# Patient Record
Sex: MALE | Race: WHITE | Employment: OTHER | URBAN - METROPOLITAN AREA
[De-identification: names, ages, dates, MRNs, and addresses within clinical notes are randomized per-mention and may not be internally consistent; named-entity substitution may affect disease eponyms.]

---

## 2017-01-23 ENCOUNTER — ANTI-COAG VISIT (OUTPATIENT)
Dept: CARDIOLOGY CLINIC | Age: 72
End: 2017-01-23

## 2017-01-23 ENCOUNTER — TELEPHONE (OUTPATIENT)
Dept: CARDIOLOGY CLINIC | Age: 72
End: 2017-01-23

## 2017-01-23 LAB — INR BLD: 2.09

## 2017-02-03 ENCOUNTER — TELEPHONE (OUTPATIENT)
Dept: CARDIOLOGY CLINIC | Age: 72
End: 2017-02-03

## 2017-02-03 RX ORDER — WARFARIN SODIUM 2.5 MG/1
TABLET ORAL
Qty: 90 TABLET | Refills: 3 | Status: SHIPPED | OUTPATIENT
Start: 2017-02-03 | End: 2017-02-28 | Stop reason: SDUPTHER

## 2017-02-03 RX ORDER — DILTIAZEM HYDROCHLORIDE 180 MG/1
180 CAPSULE, COATED, EXTENDED RELEASE ORAL DAILY
Qty: 90 CAPSULE | Refills: 3 | Status: SHIPPED | OUTPATIENT
Start: 2017-02-03 | End: 2017-02-28 | Stop reason: SDUPTHER

## 2017-02-27 ENCOUNTER — ANTI-COAG VISIT (OUTPATIENT)
Dept: CARDIOLOGY CLINIC | Age: 72
End: 2017-02-27

## 2017-02-27 LAB — INR BLD: 2.85

## 2017-02-28 ENCOUNTER — TELEPHONE (OUTPATIENT)
Dept: CARDIOLOGY CLINIC | Age: 72
End: 2017-02-28

## 2017-02-28 RX ORDER — WARFARIN SODIUM 2.5 MG/1
TABLET ORAL
Qty: 90 TABLET | Refills: 3 | Status: SHIPPED | OUTPATIENT
Start: 2017-02-28 | End: 2018-03-16 | Stop reason: SDUPTHER

## 2017-02-28 RX ORDER — DILTIAZEM HYDROCHLORIDE 180 MG/1
180 CAPSULE, COATED, EXTENDED RELEASE ORAL DAILY
Qty: 90 CAPSULE | Refills: 3 | Status: SHIPPED | OUTPATIENT
Start: 2017-02-28 | End: 2017-09-01 | Stop reason: SDUPTHER

## 2017-03-21 ENCOUNTER — NURSE ONLY (OUTPATIENT)
Dept: CARDIOLOGY CLINIC | Age: 72
End: 2017-03-21

## 2017-03-21 DIAGNOSIS — Z95.0 CARDIAC PACEMAKER IN SITU: Primary | ICD-10-CM

## 2017-03-21 DIAGNOSIS — I44.2 ATRIOVENTRICULAR BLOCK, COMPLETE (HCC): Chronic | ICD-10-CM

## 2017-03-21 PROCEDURE — 93296 REM INTERROG EVL PM/IDS: CPT | Performed by: INTERNAL MEDICINE

## 2017-03-21 PROCEDURE — 93294 REM INTERROG EVL PM/LDLS PM: CPT | Performed by: INTERNAL MEDICINE

## 2017-03-27 ENCOUNTER — ANTI-COAG VISIT (OUTPATIENT)
Dept: CARDIOLOGY CLINIC | Age: 72
End: 2017-03-27

## 2017-03-27 LAB
INR BLD: 3.3
INR BLD: 3.3

## 2017-03-28 ENCOUNTER — ANTI-COAG VISIT (OUTPATIENT)
Dept: CARDIOLOGY CLINIC | Age: 72
End: 2017-03-28

## 2017-04-24 ENCOUNTER — TELEPHONE (OUTPATIENT)
Dept: CARDIOLOGY CLINIC | Age: 72
End: 2017-04-24

## 2017-04-24 ENCOUNTER — ANTI-COAG VISIT (OUTPATIENT)
Dept: CARDIOLOGY CLINIC | Age: 72
End: 2017-04-24

## 2017-04-24 DIAGNOSIS — I48.0 PAROXYSMAL ATRIAL FIBRILLATION (HCC): Primary | Chronic | ICD-10-CM

## 2017-04-24 DIAGNOSIS — Z95.2 S/P AVR (AORTIC VALVE REPLACEMENT): Chronic | ICD-10-CM

## 2017-04-24 LAB — INR BLD: 3.13

## 2017-05-17 ENCOUNTER — OFFICE VISIT (OUTPATIENT)
Dept: CARDIOLOGY CLINIC | Age: 72
End: 2017-05-17

## 2017-05-17 VITALS
DIASTOLIC BLOOD PRESSURE: 76 MMHG | WEIGHT: 179.4 LBS | BODY MASS INDEX: 25.68 KG/M2 | HEIGHT: 70 IN | OXYGEN SATURATION: 96 % | SYSTOLIC BLOOD PRESSURE: 122 MMHG | HEART RATE: 72 BPM

## 2017-05-17 DIAGNOSIS — E78.5 HYPERLIPIDEMIA, UNSPECIFIED HYPERLIPIDEMIA TYPE: ICD-10-CM

## 2017-05-17 DIAGNOSIS — Z95.2 S/P AVR (AORTIC VALVE REPLACEMENT): Primary | ICD-10-CM

## 2017-05-17 DIAGNOSIS — I48.0 PAROXYSMAL ATRIAL FIBRILLATION (HCC): ICD-10-CM

## 2017-05-17 DIAGNOSIS — I10 ESSENTIAL HYPERTENSION: ICD-10-CM

## 2017-05-17 DIAGNOSIS — Z95.0 CARDIAC PACEMAKER IN SITU: ICD-10-CM

## 2017-05-17 DIAGNOSIS — Q21.0 VSD (VENTRICULAR SEPTAL DEFECT): ICD-10-CM

## 2017-05-17 DIAGNOSIS — Z86.79 S/P ABLATION OF ATRIAL FLUTTER: ICD-10-CM

## 2017-05-17 DIAGNOSIS — Z98.890 S/P ABLATION OF ATRIAL FLUTTER: ICD-10-CM

## 2017-05-17 PROCEDURE — 99213 OFFICE O/P EST LOW 20 MIN: CPT | Performed by: INTERNAL MEDICINE

## 2017-05-17 PROCEDURE — 4040F PNEUMOC VAC/ADMIN/RCVD: CPT | Performed by: INTERNAL MEDICINE

## 2017-05-17 PROCEDURE — 1123F ACP DISCUSS/DSCN MKR DOCD: CPT | Performed by: INTERNAL MEDICINE

## 2017-05-17 PROCEDURE — 1036F TOBACCO NON-USER: CPT | Performed by: INTERNAL MEDICINE

## 2017-05-17 PROCEDURE — G8420 CALC BMI NORM PARAMETERS: HCPCS | Performed by: INTERNAL MEDICINE

## 2017-05-17 PROCEDURE — 3017F COLORECTAL CA SCREEN DOC REV: CPT | Performed by: INTERNAL MEDICINE

## 2017-05-17 PROCEDURE — G8427 DOCREV CUR MEDS BY ELIG CLIN: HCPCS | Performed by: INTERNAL MEDICINE

## 2017-05-22 ENCOUNTER — ANTI-COAG VISIT (OUTPATIENT)
Dept: CARDIOLOGY CLINIC | Age: 72
End: 2017-05-22

## 2017-05-22 LAB
COAGULATION TISSUE FACTOR INDUCED BLD TIME PT. COAG: 38.1 SECS
INR BLD: 3.63
INR BLD: 3.63

## 2017-06-19 ENCOUNTER — ANTI-COAG VISIT (OUTPATIENT)
Dept: CARDIOLOGY CLINIC | Age: 72
End: 2017-06-19

## 2017-06-19 LAB
COAGULATION TISSUE FACTOR INDUCED BLD TIME PT. COAG: 29.6 SECS
INR BLD: 2.8
INR BLD: 2.8

## 2017-06-20 ENCOUNTER — NURSE ONLY (OUTPATIENT)
Dept: CARDIOLOGY CLINIC | Age: 72
End: 2017-06-20

## 2017-06-20 DIAGNOSIS — I44.2 ATRIOVENTRICULAR BLOCK, COMPLETE (HCC): Chronic | ICD-10-CM

## 2017-06-20 DIAGNOSIS — Z95.0 CARDIAC PACEMAKER IN SITU: Primary | ICD-10-CM

## 2017-06-20 PROCEDURE — 93294 REM INTERROG EVL PM/LDLS PM: CPT | Performed by: INTERNAL MEDICINE

## 2017-06-20 PROCEDURE — 93296 REM INTERROG EVL PM/IDS: CPT | Performed by: INTERNAL MEDICINE

## 2017-07-17 ENCOUNTER — ANTI-COAG VISIT (OUTPATIENT)
Dept: CARDIOLOGY CLINIC | Age: 72
End: 2017-07-17

## 2017-07-17 LAB — INR BLD: 3.1

## 2017-08-14 ENCOUNTER — ANTI-COAG VISIT (OUTPATIENT)
Dept: CARDIOLOGY CLINIC | Age: 72
End: 2017-08-14

## 2017-09-01 ENCOUNTER — OFFICE VISIT (OUTPATIENT)
Dept: CARDIOLOGY CLINIC | Age: 72
End: 2017-09-01

## 2017-09-01 VITALS
OXYGEN SATURATION: 97 % | BODY MASS INDEX: 25.2 KG/M2 | HEART RATE: 72 BPM | HEIGHT: 70 IN | WEIGHT: 176 LBS | SYSTOLIC BLOOD PRESSURE: 113 MMHG | DIASTOLIC BLOOD PRESSURE: 73 MMHG

## 2017-09-01 DIAGNOSIS — Z79.899 ON AMIODARONE THERAPY: ICD-10-CM

## 2017-09-01 DIAGNOSIS — Z86.79 S/P ABLATION OF ATRIAL FLUTTER: Chronic | ICD-10-CM

## 2017-09-01 DIAGNOSIS — Z95.0 CARDIAC PACEMAKER IN SITU: Primary | ICD-10-CM

## 2017-09-01 DIAGNOSIS — Z98.890 S/P ABLATION OF ATRIAL FLUTTER: Chronic | ICD-10-CM

## 2017-09-01 DIAGNOSIS — I44.2 ATRIOVENTRICULAR BLOCK, COMPLETE (HCC): Chronic | ICD-10-CM

## 2017-09-01 PROCEDURE — 4040F PNEUMOC VAC/ADMIN/RCVD: CPT | Performed by: INTERNAL MEDICINE

## 2017-09-01 PROCEDURE — 1123F ACP DISCUSS/DSCN MKR DOCD: CPT | Performed by: INTERNAL MEDICINE

## 2017-09-01 PROCEDURE — 1036F TOBACCO NON-USER: CPT | Performed by: INTERNAL MEDICINE

## 2017-09-01 PROCEDURE — 3017F COLORECTAL CA SCREEN DOC REV: CPT | Performed by: INTERNAL MEDICINE

## 2017-09-01 PROCEDURE — 99214 OFFICE O/P EST MOD 30 MIN: CPT | Performed by: INTERNAL MEDICINE

## 2017-09-01 PROCEDURE — G8419 CALC BMI OUT NRM PARAM NOF/U: HCPCS | Performed by: INTERNAL MEDICINE

## 2017-09-01 PROCEDURE — 93280 PM DEVICE PROGR EVAL DUAL: CPT | Performed by: INTERNAL MEDICINE

## 2017-09-01 PROCEDURE — G8427 DOCREV CUR MEDS BY ELIG CLIN: HCPCS | Performed by: INTERNAL MEDICINE

## 2017-09-06 ENCOUNTER — TELEPHONE (OUTPATIENT)
Dept: CARDIOLOGY CLINIC | Age: 72
End: 2017-09-06

## 2017-09-11 ENCOUNTER — ANTI-COAG VISIT (OUTPATIENT)
Dept: CARDIOLOGY CLINIC | Age: 72
End: 2017-09-11

## 2017-09-11 LAB
ALBUMIN SERPL-MCNC: 3.6 G/DL
ALP BLD-CCNC: 80 U/L
ALT SERPL-CCNC: 98 U/L
AST SERPL-CCNC: 61 U/L
BILIRUB SERPL-MCNC: 0.2 MG/DL
BILIRUB SERPL-MCNC: 0.83 MG/DL
HEPATIC FUNCTION PANEL: ABNORMAL
INR BLD: 2.19
T4 FREE: 1.27 NG/DL
THYROTROPIN RELEASING HORMONE: 5.24 UIU/ML
TOTAL PROTEIN: 6.4 G/DL

## 2017-10-02 ENCOUNTER — ANTI-COAG VISIT (OUTPATIENT)
Dept: CARDIOLOGY CLINIC | Age: 72
End: 2017-10-02

## 2017-10-02 LAB — INR BLD: 2.24

## 2017-10-30 ENCOUNTER — ANTI-COAG VISIT (OUTPATIENT)
Dept: CARDIOLOGY CLINIC | Age: 72
End: 2017-10-30

## 2017-10-30 LAB — INR BLD: 2.07

## 2017-11-10 ENCOUNTER — OFFICE VISIT (OUTPATIENT)
Dept: CARDIOLOGY CLINIC | Age: 72
End: 2017-11-10

## 2017-11-10 ENCOUNTER — TELEPHONE (OUTPATIENT)
Dept: CARDIOLOGY CLINIC | Age: 72
End: 2017-11-10

## 2017-11-10 ENCOUNTER — PROCEDURE VISIT (OUTPATIENT)
Dept: CARDIOLOGY CLINIC | Age: 72
End: 2017-11-10

## 2017-11-10 VITALS
WEIGHT: 184 LBS | BODY MASS INDEX: 26.34 KG/M2 | HEIGHT: 70 IN | DIASTOLIC BLOOD PRESSURE: 60 MMHG | HEART RATE: 60 BPM | SYSTOLIC BLOOD PRESSURE: 102 MMHG

## 2017-11-10 DIAGNOSIS — I48.0 PAROXYSMAL ATRIAL FIBRILLATION (HCC): Chronic | ICD-10-CM

## 2017-11-10 DIAGNOSIS — I44.2 ATRIOVENTRICULAR BLOCK, COMPLETE (HCC): Chronic | ICD-10-CM

## 2017-11-10 DIAGNOSIS — Z95.0 CARDIAC PACEMAKER IN SITU: Chronic | ICD-10-CM

## 2017-11-10 DIAGNOSIS — Z95.0 CARDIAC PACEMAKER IN SITU: Primary | ICD-10-CM

## 2017-11-10 DIAGNOSIS — Z95.2 S/P AVR (AORTIC VALVE REPLACEMENT): Primary | Chronic | ICD-10-CM

## 2017-11-10 PROCEDURE — 3017F COLORECTAL CA SCREEN DOC REV: CPT | Performed by: INTERNAL MEDICINE

## 2017-11-10 PROCEDURE — G8484 FLU IMMUNIZE NO ADMIN: HCPCS | Performed by: INTERNAL MEDICINE

## 2017-11-10 PROCEDURE — 93280 PM DEVICE PROGR EVAL DUAL: CPT | Performed by: INTERNAL MEDICINE

## 2017-11-10 PROCEDURE — G8427 DOCREV CUR MEDS BY ELIG CLIN: HCPCS | Performed by: INTERNAL MEDICINE

## 2017-11-10 PROCEDURE — 4040F PNEUMOC VAC/ADMIN/RCVD: CPT | Performed by: INTERNAL MEDICINE

## 2017-11-10 PROCEDURE — G8417 CALC BMI ABV UP PARAM F/U: HCPCS | Performed by: INTERNAL MEDICINE

## 2017-11-10 PROCEDURE — 1123F ACP DISCUSS/DSCN MKR DOCD: CPT | Performed by: INTERNAL MEDICINE

## 2017-11-10 PROCEDURE — 1036F TOBACCO NON-USER: CPT | Performed by: INTERNAL MEDICINE

## 2017-11-10 PROCEDURE — 99214 OFFICE O/P EST MOD 30 MIN: CPT | Performed by: INTERNAL MEDICINE

## 2017-11-10 RX ORDER — METOPROLOL SUCCINATE 50 MG/1
TABLET, EXTENDED RELEASE ORAL
Qty: 90 TABLET | Refills: 3 | Status: SHIPPED | OUTPATIENT
Start: 2017-11-10 | End: 2018-12-06 | Stop reason: SDUPTHER

## 2017-11-10 RX ORDER — DILTIAZEM HYDROCHLORIDE 180 MG/1
CAPSULE, COATED, EXTENDED RELEASE ORAL
Qty: 90 CAPSULE | Refills: 3 | Status: SHIPPED | OUTPATIENT
Start: 2017-11-10 | End: 2018-12-06 | Stop reason: SDUPTHER

## 2017-11-10 NOTE — PROGRESS NOTES
surgery (12-28-12); and ventral hernia repair. Social History: reports that he has never smoked. He has never used smokeless tobacco. He reports that he does not drink alcohol or use drugs. Family History:family history includes Cancer in his mother. Home Medications:  Outpatient Encounter Prescriptions as of 11/10/2017   Medication Sig Dispense Refill    warfarin (COUMADIN) 2.5 MG tablet One tab by mouth daily or as directed 90 tablet 3    diltiazem (CARDIZEM CD) 180 MG extended release capsule Take 1 capsule by mouth  daily 90 capsule 3    furosemide (LASIX) 40 MG tablet Take 1 tablet by mouth  daily 90 tablet 3    metoprolol succinate (TOPROL XL) 50 MG extended release tablet Take 1 tablet by mouth  daily 90 tablet 3    amiodarone (CORDARONE) 200 MG tablet Take 1 tablet by mouth  daily (Patient taking differently: 100 mg Take 1 tablet by mouth  daily) 90 tablet 3    finasteride (PROSCAR) 5 MG tablet Take 1 tablet by mouth nightly. 90 tablet 3    Multiple Vitamins-Minerals (MULTIVITAMIN PO) Take 1 tablet by mouth daily.  levothyroxine (LEVOTHROID) 75 MCG tablet Take 75 mcg by mouth daily.  aspirin EC 81 MG EC tablet Take 81 mg by mouth daily.  tamsulosin (FLOMAX) 0.4 MG capsule Take 0.4 mg by mouth daily. No facility-administered encounter medications on file as of 11/10/2017. Allergies:Digoxin     Review of Systems   Constitutional: Negative. HENT: Negative. Eyes: Negative. Respiratory: Negative. Cardiovascular: No Palpitations, has BLE edema  Gastrointestinal: Negative. Genitourinary: Negative. Musculoskeletal: Negative. Skin: Negative. Neurological: Negative. Hematological: Negative. Psychiatric/Behavioral: Negative. /60   Pulse 60   Ht 5' 10\" (1.778 m)   Wt 184 lb (83.5 kg)   BMI 26.40 kg/m² . Stable. EKG today--Sinus  Rhythm   -RSR(V1) -nondiagnostic.    -Old inferior infarct.    Objective:  Physical Exam   Constitutional:

## 2017-11-10 NOTE — PROGRESS NOTES
Patient comes in for programming evaluation for his [address] Pacemaker. All sensing and pacing parameters are within normal range. No changes need to be made at this time. Please see interrogation for more detail. AF burden 20%. Last PAF 11/10/17. Patient to see Dr. Karen Gallegos today. Patient will follow up in 3 months in office or remotely.

## 2017-11-13 NOTE — TELEPHONE ENCOUNTER
Wife of pt called and adv they are not available the entire month of dec. I adv we will reach out in Dec later when [facility] Jan 2018 scheduled is available.     Pt is to be contacted on cell in Dec:  Kevin Ellis  Wife Cell [phone]

## 2017-11-13 NOTE — COMMUNICATION BODY
Assessment  1. Paroxysmal atrial fibrillation- 20% AF burden, but the majority of the episodes are brief in duration. The AF burden has been very consistent at 13- 20%. 2. S/P AVR: porcine    3. 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 Continue amiodarone 100 mg.  2. INR goal 2.0-3.0 in the setting of PAF and aortic valve replacement. 3. Recommend consideration for catheter ablation for PAF.    4. Follow up with me in 3 months.         Zak Morrison M.D.

## 2017-11-21 ENCOUNTER — OFFICE VISIT (OUTPATIENT)
Dept: CARDIOLOGY CLINIC | Age: 72
End: 2017-11-21

## 2017-11-21 VITALS
SYSTOLIC BLOOD PRESSURE: 102 MMHG | OXYGEN SATURATION: 96 % | HEART RATE: 67 BPM | WEIGHT: 179 LBS | BODY MASS INDEX: 25.62 KG/M2 | DIASTOLIC BLOOD PRESSURE: 62 MMHG | HEIGHT: 70 IN

## 2017-11-21 DIAGNOSIS — I48.0 PAROXYSMAL ATRIAL FIBRILLATION (HCC): Chronic | ICD-10-CM

## 2017-11-21 DIAGNOSIS — I10 ESSENTIAL HYPERTENSION: Chronic | ICD-10-CM

## 2017-11-21 DIAGNOSIS — Z95.2 S/P AVR (AORTIC VALVE REPLACEMENT): Primary | Chronic | ICD-10-CM

## 2017-11-21 DIAGNOSIS — E78.2 MIXED HYPERLIPIDEMIA: Chronic | ICD-10-CM

## 2017-11-21 PROCEDURE — G8427 DOCREV CUR MEDS BY ELIG CLIN: HCPCS | Performed by: INTERNAL MEDICINE

## 2017-11-21 PROCEDURE — 1123F ACP DISCUSS/DSCN MKR DOCD: CPT | Performed by: INTERNAL MEDICINE

## 2017-11-21 PROCEDURE — 1036F TOBACCO NON-USER: CPT | Performed by: INTERNAL MEDICINE

## 2017-11-21 PROCEDURE — 99214 OFFICE O/P EST MOD 30 MIN: CPT | Performed by: INTERNAL MEDICINE

## 2017-11-21 PROCEDURE — G8417 CALC BMI ABV UP PARAM F/U: HCPCS | Performed by: INTERNAL MEDICINE

## 2017-11-21 PROCEDURE — G8484 FLU IMMUNIZE NO ADMIN: HCPCS | Performed by: INTERNAL MEDICINE

## 2017-11-21 PROCEDURE — 4040F PNEUMOC VAC/ADMIN/RCVD: CPT | Performed by: INTERNAL MEDICINE

## 2017-11-21 PROCEDURE — 3017F COLORECTAL CA SCREEN DOC REV: CPT | Performed by: INTERNAL MEDICINE

## 2017-11-21 NOTE — COMMUNICATION BODY
disease. Surgical History:   has a past surgical history that includes Cardiac surgery (2009); cyst removal (1972); skin biopsy; Pacemaker insertion; Atrial ablation surgery (12-28-12); and ventral hernia repair. Social History:    , lives with spouse in [facility]. He is retired , and current  [facility] reports that he has never smoked. He has never used smokeless tobacco. He reports that he does not drink alcohol or use drugs. Family History:  family history includes Cancer in his mother. Home Medications:  Prior to Admission medications    Medication Sig Start Date End Date Taking? Authorizing Provider   diltiazem (CARDIZEM CD) 180 MG extended release capsule Take 1 capsule by mouth  daily 11/10/17  Yes Eliza Medeiros MD   metoprolol succinate (TOPROL XL) 50 MG extended release tablet Take 1 tablet by mouth  daily 11/10/17  Yes Eliza Medeiros MD   warfarin (COUMADIN) 2.5 MG tablet One tab by mouth daily or as directed 2/28/17  Yes Merlin Chang NP   furosemide (LASIX) 40 MG tablet Take 1 tablet by mouth  daily 11/29/16  Yes Paulo Cruz MD   amiodarone (CORDARONE) 200 MG tablet Take 1 tablet by mouth  daily  Patient taking differently: 100 mg daily Take 1 tablet by mouth  daily 11/29/16  Yes Paulo Cruz MD   finasteride (PROSCAR) 5 MG tablet Take 1 tablet by mouth nightly. 7/18/14  Yes Eliza Medeiros MD   Multiple Vitamins-Minerals (MULTIVITAMIN PO) Take 1 tablet by mouth daily. Yes Historical Provider, MD   levothyroxine (LEVOTHROID) 75 MCG tablet Take 75 mcg by mouth daily. Yes Historical Provider, MD   aspirin EC 81 MG EC tablet Take 81 mg by mouth daily. 5/28/07  Yes Historical Provider, MD   tamsulosin (FLOMAX) 0.4 MG capsule Take 0.4 mg by mouth daily. Yes Historical Provider, MD        Allergies:  Digoxin     Review of Systems:   · Constitutional: there has been no unanticipated weight loss. spheres  · Moves all extremities well  · Exhibits normal gait balance and coordination  · No abnormalities of mood, affect, memory, mentation, or behavior are noted  Skin:  · Skin: warm and dry. Lab Results   Component Value Date    CHOL 275 (H) 08/04/2014    CHOL 244 (H) 10/01/2013    CHOL 266 (H) 03/14/2011     Lab Results   Component Value Date    TRIG 132 08/04/2014    TRIG 136 10/01/2013    TRIG 148 03/14/2011     Lab Results   Component Value Date    HDL 67 08/04/2014    HDL 62 10/01/2013    HDL 68 (H) 03/14/2011     Lab Results   Component Value Date    LDLCALC 168 (H) 03/14/2011     Lab Results   Component Value Date    LABVLDL 30 03/14/2011    VLDL 26 08/04/2014    VLDL 27 10/01/2013     No results found for: CHOLHDLRATIO    Assessment:     1. S/P AVR (aortic valve replacement):  S/P porcine AVR and VSD closure in 12/06. Most recent ECHO 12/1/15  showed EF 55%; normally functioning porcine aortic valve mean gradient of 12 mmHg. Velocity=1.53m/s. 2. Paroxysmal atrial fibrillation ([address]):  Note Pacemaker interrogation on 07/20/17 showed 13 % AF burden, 70% of the episodes are less than 5 minutes in duration. He has 81% RA pacing and 3.2% RV pacing. Most recent EKG 11/10/17 showed NSR 76 bpm; incomplete RBBB; inferior infarct pattern. Follows with EP partner, Dr. Suleman Jones, and is on amiodarone with chronically elevated LFT's. Note dose was decreased from 200mg to 100mg by him at his last OV. 3. Essential hypertension:  Well controlled and will continue current medical regimen. 4. Mixed hyperlipidemia: He is NOT on statin med per Dr. Sam Smith due to elevated LFT's on amiodarone and I agree that statins would not be a good idea. Encouraged good diet as best possible. Need copy of most recent lipids since 2014 is latest result in [facility] system. Could consider PCSK9 inhibitors if not improved and patient can afford. He is not inclined to change regimen at this time. Plan:  1.  No med changes

## 2017-11-21 NOTE — PROGRESS NOTES
disease. Surgical History:   has a past surgical history that includes Cardiac surgery (2009); cyst removal (1972); skin biopsy; Pacemaker insertion; Atrial ablation surgery (12-28-12); and ventral hernia repair. Social History:   , lives with spouse in [facility]. He is retired , and current  [facility] reports that he has never smoked. He has never used smokeless tobacco. He reports that he does not drink alcohol or use drugs. Family History:  family history includes Cancer in his mother. Home Medications:  Prior to Admission medications    Medication Sig Start Date End Date Taking? Authorizing Provider   diltiazem (CARDIZEM CD) 180 MG extended release capsule Take 1 capsule by mouth  daily 11/10/17  Yes Oskar Schuster MD   metoprolol succinate (TOPROL XL) 50 MG extended release tablet Take 1 tablet by mouth  daily 11/10/17  Yes Oskar Schuster MD   warfarin (COUMADIN) 2.5 MG tablet One tab by mouth daily or as directed 2/28/17  Yes Esaw Goltz, NP   furosemide (LASIX) 40 MG tablet Take 1 tablet by mouth  daily 11/29/16  Yes Kavon Barr MD   amiodarone (CORDARONE) 200 MG tablet Take 1 tablet by mouth  daily  Patient taking differently: 100 mg daily Take 1 tablet by mouth  daily 11/29/16  Yes Kavon Barr MD   finasteride (PROSCAR) 5 MG tablet Take 1 tablet by mouth nightly. 7/18/14  Yes Oskar Schuster MD   Multiple Vitamins-Minerals (MULTIVITAMIN PO) Take 1 tablet by mouth daily. Yes Historical Provider, MD   levothyroxine (LEVOTHROID) 75 MCG tablet Take 75 mcg by mouth daily. Yes Historical Provider, MD   aspirin EC 81 MG EC tablet Take 81 mg by mouth daily. 5/28/07  Yes Historical Provider, MD   tamsulosin (FLOMAX) 0.4 MG capsule Take 0.4 mg by mouth daily. Yes Historical Provider, MD        Allergies:  Digoxin     Review of Systems:   · Constitutional: there has been no unanticipated weight loss. There's been no change in energy level, sleep pattern, or activity level. · Eyes: No visual changes or diplopia. No scleral icterus. · ENT: No Headaches, hearing loss or vertigo. No mouth sores or sore throat. · Cardiovascular: Reviewed in HPI  · Respiratory: No cough or wheezing, no sputum production. No hematemesis. · Gastrointestinal: No abdominal pain, appetite loss, blood in stools. No change in bowel or bladder habits. · Genitourinary: No dysuria, trouble voiding, or hematuria. · Musculoskeletal:  No gait disturbance, weakness or joint complaints. · Integumentary: No rash or pruritis. · Neurological: No headache, diplopia, change in muscle strength, numbness or tingling. No change in gait, balance, coordination, mood, affect, memory, mentation, behavior. · Psychiatric: No anxiety, no depression. · Endocrine: No malaise, fatigue or temperature intolerance. No excessive thirst, fluid intake, or urination. No tremor. · Hematologic/Lymphatic: No abnormal bruising or bleeding, blood clots or swollen lymph nodes. · Allergic/Immunologic: No nasal congestion or hives. Physical Examination:    Vitals:    11/21/17 1327   BP: 102/62   Pulse: 67   SpO2: 96%        Constitutional and General Appearance: NAD   Respiratory:  · Normal excursion and expansion without use of accessory muscles  · Resp Auscultation: Normal breath sounds without dullness  Cardiovascular:  · The apical impulses not displaced  · Heart tones are crisp and normal  · Cervical veins are not engorged  · The carotid upstroke is normal in amplitude and contour without delay or bruit  · Normal S1S2, No S3, 2/6 systolic Murmur  · Peripheral pulses are symmetrical and full  · There is no clubbing, cyanosis of the extremities.   · No edema  · Femoral Arteries: 2+ and equal  · Pedal Pulses: 2+ and equal   Abdomen:  · No masses or tenderness  · Liver/Spleen: No Abnormalities Noted  Neurological/Psychiatric:  · Alert and oriented in all spheres  · Moves all extremities well  · Exhibits normal gait balance and coordination  · No abnormalities of mood, affect, memory, mentation, or behavior are noted  Skin:  · Skin: warm and dry. Lab Results   Component Value Date    CHOL 275 (H) 08/04/2014    CHOL 244 (H) 10/01/2013    CHOL 266 (H) 03/14/2011     Lab Results   Component Value Date    TRIG 132 08/04/2014    TRIG 136 10/01/2013    TRIG 148 03/14/2011     Lab Results   Component Value Date    HDL 67 08/04/2014    HDL 62 10/01/2013    HDL 68 (H) 03/14/2011     Lab Results   Component Value Date    LDLCALC 168 (H) 03/14/2011     Lab Results   Component Value Date    LABVLDL 30 03/14/2011    VLDL 26 08/04/2014    VLDL 27 10/01/2013     No results found for: CHOLHDLRATIO    Assessment:     1. S/P AVR (aortic valve replacement):  S/P porcine AVR and VSD closure in 12/06. Most recent ECHO 12/1/15  showed EF 55%; normally functioning porcine aortic valve mean gradient of 12 mmHg. Velocity=1.53m/s. 2. Paroxysmal atrial fibrillation ([address]):  Note Pacemaker interrogation on 07/20/17 showed 13 % AF burden, 70% of the episodes are less than 5 minutes in duration. He has 81% RA pacing and 3.2% RV pacing. Most recent EKG 11/10/17 showed NSR 76 bpm; incomplete RBBB; inferior infarct pattern. Follows with EP partner, Dr. Henri Edwards, and is on amiodarone with chronically elevated LFT's. Note dose was decreased from 200mg to 100mg by him at his last OV. 3. Essential hypertension:  Well controlled and will continue current medical regimen. 4. Mixed hyperlipidemia: He is NOT on statin med per Dr. Kena Willard due to elevated LFT's on amiodarone and I agree that statins would not be a good idea. Encouraged good diet as best possible. Need copy of most recent lipids since 2014 is latest result in [facility] system. Could consider PCSK9 inhibitors if not improved and patient can afford. He is not inclined to change regimen at this time. Plan:  1.  No med changes

## 2017-11-28 ENCOUNTER — TELEPHONE (OUTPATIENT)
Dept: CARDIOLOGY CLINIC | Age: 72
End: 2017-11-28

## 2017-12-04 ENCOUNTER — ANTI-COAG VISIT (OUTPATIENT)
Dept: CARDIOLOGY CLINIC | Age: 72
End: 2017-12-04

## 2017-12-04 LAB
COAGULATION TISSUE FACTOR INDUCED BLD TIME PT. COAG: 21.4 SECS
INR BLD: 1.99

## 2017-12-06 ENCOUNTER — TELEPHONE (OUTPATIENT)
Dept: CARDIOLOGY CLINIC | Age: 72
End: 2017-12-06

## 2018-01-02 ENCOUNTER — ANTI-COAG VISIT (OUTPATIENT)
Dept: CARDIOLOGY CLINIC | Age: 73
End: 2018-01-02

## 2018-01-02 ENCOUNTER — TELEPHONE (OUTPATIENT)
Dept: CARDIOLOGY CLINIC | Age: 73
End: 2018-01-02

## 2018-01-02 LAB
COAGULATION TISSUE FACTOR INDUCED BLD TIME PT. COAG: 20.3 SECS
INR BLD: 1.88

## 2018-01-02 NOTE — TELEPHONE ENCOUNTER
Please call pt. He self doses. Please ask what is dosing regimen is? Has he already adjusted his dosing? If so, what changes did he make?

## 2018-01-09 ENCOUNTER — ANTI-COAG VISIT (OUTPATIENT)
Dept: CARDIOLOGY CLINIC | Age: 73
End: 2018-01-09

## 2018-01-09 ENCOUNTER — TELEPHONE (OUTPATIENT)
Dept: CARDIOLOGY CLINIC | Age: 73
End: 2018-01-09

## 2018-01-09 LAB
COAGULATION TISSUE FACTOR INDUCED BLD TIME PT. COAG: 24.4 SECS
INR BLD: 2.28

## 2018-01-09 NOTE — TELEPHONE ENCOUNTER
----- Message from Leigha Maradiaga MD sent at 1/9/2018  8:48 AM EST -----  Please let patient know that INR is therapeutic at 2.28 from today. CPM and routine check INR per protocol. Thanks.

## 2018-01-11 LAB
ALBUMIN SERPL-MCNC: 4.4 G/DL
ALP BLD-CCNC: 76 U/L
ALT SERPL-CCNC: 63 U/L
ANION GAP SERPL CALCULATED.3IONS-SCNC: NORMAL MMOL/L
AST SERPL-CCNC: 62 U/L
BILIRUB SERPL-MCNC: 0.9 MG/DL (ref 0.1–1.4)
BUN BLDV-MCNC: 28 MG/DL
CALCIUM SERPL-MCNC: 9 MG/DL
CHLORIDE BLD-SCNC: 101 MMOL/L
CHOLESTEROL, TOTAL: 263 MG/DL
CHOLESTEROL/HDL RATIO: ABNORMAL
CO2: 26 MMOL/L
CREAT SERPL-MCNC: 1.33 MG/DL
GFR CALCULATED: NORMAL
GLUCOSE BLD-MCNC: 73 MG/DL
HDLC SERPL-MCNC: 74 MG/DL (ref 35–70)
LDL CHOLESTEROL CALCULATED: 166 MG/DL (ref 0–160)
POTASSIUM SERPL-SCNC: 4.4 MMOL/L
SODIUM BLD-SCNC: 141 MMOL/L
TOTAL PROTEIN: 6.9
TRIGL SERPL-MCNC: 113 MG/DL
VLDLC SERPL CALC-MCNC: 23 MG/DL

## 2018-01-22 ENCOUNTER — OFFICE VISIT (OUTPATIENT)
Dept: CARDIOLOGY CLINIC | Age: 73
End: 2018-01-22

## 2018-01-22 ENCOUNTER — PROCEDURE VISIT (OUTPATIENT)
Dept: CARDIOLOGY CLINIC | Age: 73
End: 2018-01-22

## 2018-01-22 ENCOUNTER — TELEPHONE (OUTPATIENT)
Dept: CARDIOLOGY CLINIC | Age: 73
End: 2018-01-22

## 2018-01-22 VITALS
HEIGHT: 70 IN | BODY MASS INDEX: 26.34 KG/M2 | SYSTOLIC BLOOD PRESSURE: 114 MMHG | WEIGHT: 184 LBS | DIASTOLIC BLOOD PRESSURE: 64 MMHG | OXYGEN SATURATION: 97 % | HEART RATE: 60 BPM

## 2018-01-22 DIAGNOSIS — E78.2 MIXED HYPERLIPIDEMIA: Chronic | ICD-10-CM

## 2018-01-22 DIAGNOSIS — I10 ESSENTIAL HYPERTENSION: Chronic | ICD-10-CM

## 2018-01-22 DIAGNOSIS — Z95.0 CARDIAC PACEMAKER IN SITU: ICD-10-CM

## 2018-01-22 DIAGNOSIS — Z95.0 CARDIAC PACEMAKER IN SITU: Primary | Chronic | ICD-10-CM

## 2018-01-22 DIAGNOSIS — I44.2 ATRIOVENTRICULAR BLOCK, COMPLETE (HCC): Chronic | ICD-10-CM

## 2018-01-22 DIAGNOSIS — I48.0 PAROXYSMAL ATRIAL FIBRILLATION (HCC): Chronic | ICD-10-CM

## 2018-01-22 PROBLEM — Z95.2 S/P AVR (AORTIC VALVE REPLACEMENT): Chronic | Status: ACTIVE | Noted: 2018-01-22

## 2018-01-22 PROCEDURE — 4040F PNEUMOC VAC/ADMIN/RCVD: CPT | Performed by: INTERNAL MEDICINE

## 2018-01-22 PROCEDURE — 1036F TOBACCO NON-USER: CPT | Performed by: INTERNAL MEDICINE

## 2018-01-22 PROCEDURE — 99214 OFFICE O/P EST MOD 30 MIN: CPT | Performed by: INTERNAL MEDICINE

## 2018-01-22 PROCEDURE — 1123F ACP DISCUSS/DSCN MKR DOCD: CPT | Performed by: INTERNAL MEDICINE

## 2018-01-22 PROCEDURE — G8484 FLU IMMUNIZE NO ADMIN: HCPCS | Performed by: INTERNAL MEDICINE

## 2018-01-22 PROCEDURE — G8417 CALC BMI ABV UP PARAM F/U: HCPCS | Performed by: INTERNAL MEDICINE

## 2018-01-22 PROCEDURE — G8427 DOCREV CUR MEDS BY ELIG CLIN: HCPCS | Performed by: INTERNAL MEDICINE

## 2018-01-22 PROCEDURE — 3017F COLORECTAL CA SCREEN DOC REV: CPT | Performed by: INTERNAL MEDICINE

## 2018-01-22 PROCEDURE — 93280 PM DEVICE PROGR EVAL DUAL: CPT | Performed by: INTERNAL MEDICINE

## 2018-01-22 RX ORDER — FUROSEMIDE 40 MG/1
TABLET ORAL
Qty: 90 TABLET | Refills: 3 | Status: SHIPPED | OUTPATIENT
Start: 2018-01-22 | End: 2018-12-06 | Stop reason: SDUPTHER

## 2018-01-22 NOTE — PROGRESS NOTES
Sam   Electrophysiology   Date: 1/22/2018       CC: Atrial fibrillation   HPI: Herb Rubinstein is a 67 y.o.  male with a history of permanent pacemaker for temporary AV block post AVR (porcine), [place], and closure of VSD in 12/06. He had a radiofrequency catheter ablation for atrial flutter on 12/28/12. He has a history of PAF. Amiodarone was increased from 150 mg to 200 mg daily. He is on coumadin for anticoagulation. Pt is referred today from  to discuss ablation procedure. He reports he is asymptomatic with a fib episodes with last documented episode this morning at 10 am for 2 hour duration. Pt reports he was at [facility] with his wife but had no symptoms. Today he reports he has been feeling well. He currently denies chest pain, palpitations, syncope, dizziness, shortness of breath or fatigue. Past Medical History:   Diagnosis Date    Arthritis     Atrial fibrillation [facility])     Cardiac pacemaker     Mitral valve disease         Past Surgical History:   Procedure Laterality Date    ATRIAL ABLATION SURGERY  12-28-12    ablation of IVC-Tricuspid Valve    CARDIAC SURGERY  2009    AVR pig    CYST REMOVAL  1972    PACEMAKER INSERTION      A-fib/flutter    SKIN BIOPSY      VENTRAL HERNIA REPAIR         Allergies   Allergen Reactions    Digoxin Hives       Social History:   reports that he has never smoked. He has never used smokeless tobacco. He reports that he does not drink alcohol or use drugs. Family History:  family history includes Cancer in his mother. Review of System:  All other systems reviewed and are negative except for that noted above.  Pertinent negatives are:   General: negative for fever, chills   Ophthalmic ROS: negative for - eye pain or loss of vision  ENT ROS: negative for - headaches, sore throat   Respiratory: negative for - cough, sputum  Cardiovascular: Reviewed in HPI  Gastrointestinal: negative for - abdominal pain, diarrhea, a rate of approximately 100 beats per minute. Resolution during recovery below 100 beats per minute. Arrhythmias Occasional premature ventricular contractions. 4 beats nonsustained ventricular tachycardia during recovery. Symptoms No symptoms with exercise . Echo 2/1/15   Summary -Normal left ventricle size, wall thickness and systolic function with an estimated ejection fraction of 55%. -Mitral annular calcification is present. Mild mitral regurgitation is present. -A porcine aortic valve appears well seated with a maximum gradient of 18 mmHg and a mean gradient of 12 mmHg. Mild aortic regurgitation is present. -Pacer / ICD wire is visualized in the right ventricle. -There is mild to moderate tricuspid regurgitation with RVSP estimated at 40 mmHg. -Mild pulmonic regurgitation present. Medication:  Current Outpatient Prescriptions   Medication Sig Dispense Refill    diltiazem (CARDIZEM CD) 180 MG extended release capsule Take 1 capsule by mouth  daily 90 capsule 3    metoprolol succinate (TOPROL XL) 50 MG extended release tablet Take 1 tablet by mouth  daily 90 tablet 3    warfarin (COUMADIN) 2.5 MG tablet One tab by mouth daily or as directed 90 tablet 3    furosemide (LASIX) 40 MG tablet Take 1 tablet by mouth  daily 90 tablet 3    amiodarone (CORDARONE) 200 MG tablet Take 1 tablet by mouth  daily (Patient taking differently: 100 mg daily Take 1 tablet by mouth  daily) 90 tablet 3    finasteride (PROSCAR) 5 MG tablet Take 1 tablet by mouth nightly. 90 tablet 3    Multiple Vitamins-Minerals (MULTIVITAMIN PO) Take 1 tablet by mouth daily.  levothyroxine (LEVOTHROID) 75 MCG tablet Take 75 mcg by mouth daily.  aspirin EC 81 MG EC tablet Take 81 mg by mouth daily.  tamsulosin (FLOMAX) 0.4 MG capsule Take 0.4 mg by mouth daily. No current facility-administered medications for this visit.         Assessment and plan:     Paroxysmal atrial fibrillation     Interrogation of PPM shows ~ AF 21%   During a fib his ventricular cycle length is between 500 and 550ms   Patient appears to be asymptomatic during atrial fibrillation. He had an episode of atrial fibrillation this morning which she was not aware of it. It is not clear heart symptomatic disease with these episodes of atrial fibrillation. Discussed at length with patient. Instructed to pay attention to his symptoms to see if there is any correlation between episodes of atrial fibrillation and cardiac symptoms. Treatment options including antiarrhythmic therapy and/or ablation were discussed with the patient. Risks, benefits and alternative of each treatment options were explained. All questions answered. On coumadin for AC     Afib risk factors including age, HTN, obesity, inactivity and MARY were discussed with patient. Risk factor modification recommended. All questions were answered. Ablation for atrial fibrillation discussed. The risks, benefits and alternatives of the ablation procedure were discussed with the patient. The risks including, but not limited to, the risks of bleeding, infection, radiation exposure, injury to vascular, cardiac and surrounding structures (including pneumothorax), stroke, cardiac perforation, tamponade, need for emergent open heart surgery, need for pacemaker implantation, Injury to the phrenic nerve, injury to the esophagus, myocardial infarction and death were discussed in detail. He will discuss it with Dr Isela Palafox and will let me know of his decision. PPM   Pacemaker interrogation on 07/20/15 showed 13 % AF burden, 70% of the episodes are less than 5 minutes in duration. He has 81% RA pacing and 3.2% RV pacing. His device check from 6/20/16 showed his AF burden is 23% and he is  6.9%. His device check on 12/2016 showed an Afib burden of 19%. It shows that 87% of his episodes last less than 1 hour and 64% of his episodes lasts less than 5 minutes.    His device check 9/1/17

## 2018-01-22 NOTE — PATIENT INSTRUCTIONS
Patient Education   Patient Education        Electrophysiology Study and Catheter Ablation: What to Expect at [address] [place] [address] had an electrophysiology study for a problem with your heartbeat. You may also have had a catheter ablation to try to correct the problem. You may have swelling, bruising, or a small lump around the site where the catheters went into your body. These should go away in 3 to 4 weeks. Do not exercise hard or lift anything heavy for a week. You may be able to go back to work and to your normal routine in 1 or 2 days. This care sheet gives you a general idea about how long it will take for you to recover. But each person recovers at a different pace. Follow the steps below to get better as quickly as possible. How can you care for yourself at home? Activity  ? · For 1 week, do not lift anything that would make you strain. This may include heavy grocery bags and milk containers, a heavy briefcase or backpack, cat litter or dog food bags, a vacuum , or a child. ? · For 1 week, do not exercise hard or do any activity that could strain your blood vessels or the site where the catheters went into your body. ? · Ask your doctor when it is okay to have sex. ? · You may shower 24 to 48 hours after the procedure, if your doctor okays it. Pat the incision dry. Do not take a bath for 1 week, or until your doctor tells you it isokay. Diet  ? · You can eat your normal diet. If your stomach is upset, try bland, low-fat foods like plain rice, broiled chicken, toast, and yogurt. ? · Drink plenty of fluids (unless your doctor tells you not to). Medicines  ? · Your doctor will tell you if and when you can restart your medicines. He or she will also give you instructions about taking any new medicines. ? · If you take blood thinners, such as warfarin (Coumadin), clopidogrel (Plavix), or aspirin, be sure to talk to your doctor.  He or she will tell you if and when to start taking those medicines again. Make sure that you understand exactly what your doctor wants you to do. ? · Ask your doctor if you can take acetaminophen (Tylenol) for pain. Do not take aspirin for 3 days, unless your doctor says it is okay. ? · Check with your doctor before you take aspirin or anti-inflammatory medicines to reduce pain and swelling. These include ibuprofen (Advil, Motrin) and naproxen (Aleve). ? · Make sure you know which heart medicines to continue and which ones to stop. Ask your doctor if you are not sure. ?Catheter site care  ? · You can remove your bandages the day after the procedure. Follow-up care is a key part of your treatment and safety. Be sure to make and go to all appointments, and call your doctor if you are having problems. It's also a good idea to know your test results and keep a list of the medicines you take. When should you call for help? Call 911 anytime you think you may need emergency care. For example, call if:  ? · You passed out (lost consciousness). ? · You have symptoms of a heart attack. These may include:  ¨ Chest pain or pressure, or a strange feeling in the chest.  ¨ Sweating. ¨ Shortness of breath. ¨ Nausea or vomiting. ¨ Pain, pressure, or a strange feeling in the back, neck, jaw, or upper belly, or in one or both shoulders or arms. ¨ Lightheadedness or sudden weakness. ¨ A fast or irregular heartbeat. After you call 911, the  may tell you to chew 1 adult-strength or 2 to 4 low-dose aspirin. Wait for an ambulance. Do not try to drive yourself. ? · You have symptoms of a stroke. These may include:  ¨ Sudden numbness, tingling, weakness, or loss of movement in your face, arm, or leg, especially on clementine side of your body. ¨ Sudden vision changes. ¨ Sudden trouble speaking. ¨ Sudden confusion or trouble understanding simple statements. ¨ Sudden problems with walking or balance.   ¨ A sudden, severe headache that is different from past But you will need to keep your arm still for at least 1 hour. ? · You may have a bruise or a small lump where the catheter was put in your blood vessel. This is normal and will go away. Going home   · Be sure you have someone to drive you home. Anesthesia and pain medicine make it unsafe for you to drive. ? · You will be given more specific instructions about recovering from your procedure. They will cover things like diet, wound care, follow-up care, driving, and getting back to your normal routine. When should you call your doctor? · You have questions or concerns. ? · You don't understand how to prepare for your procedure. ? · You become ill before the procedure (such as fever, flu, or a cold). ? · You need to reschedule or have changed your mind about having the procedure. Where can you learn more? Go to https://[employer]. org and sign in to your [employer] account. Enter Y211 in the ZeroMail box to learn more about \"Electrophysiology Study and Catheter Ablation: Before Your Procedure. \"     If you do not have an account, please click on the \"Sign Up Now\" link. Current as of: September 21, 2016  Content Version: 11.5  © 0640-3773 Healthwise, Incorporated. Care instructions adapted under license by [facility] ([facility]). If you have questions about a medical condition or this instruction, always ask your healthcare professional. [place] [address] any warranty or liability for your use of this information.

## 2018-02-05 ENCOUNTER — TELEPHONE (OUTPATIENT)
Dept: CARDIOLOGY CLINIC | Age: 73
End: 2018-02-05

## 2018-02-06 ENCOUNTER — ANTI-COAG VISIT (OUTPATIENT)
Dept: CARDIOLOGY CLINIC | Age: 73
End: 2018-02-06

## 2018-02-09 ENCOUNTER — PROCEDURE VISIT (OUTPATIENT)
Dept: CARDIOLOGY CLINIC | Age: 73
End: 2018-02-09

## 2018-02-09 ENCOUNTER — OFFICE VISIT (OUTPATIENT)
Dept: CARDIOLOGY CLINIC | Age: 73
End: 2018-02-09

## 2018-02-09 VITALS
DIASTOLIC BLOOD PRESSURE: 68 MMHG | SYSTOLIC BLOOD PRESSURE: 104 MMHG | WEIGHT: 185 LBS | HEART RATE: 109 BPM | OXYGEN SATURATION: 97 % | HEIGHT: 70 IN | BODY MASS INDEX: 26.48 KG/M2

## 2018-02-09 DIAGNOSIS — I48.0 PAROXYSMAL ATRIAL FIBRILLATION (HCC): Primary | Chronic | ICD-10-CM

## 2018-02-09 DIAGNOSIS — Z95.0 CARDIAC PACEMAKER IN SITU: Primary | ICD-10-CM

## 2018-02-09 DIAGNOSIS — I44.2 ATRIOVENTRICULAR BLOCK, COMPLETE (HCC): Chronic | ICD-10-CM

## 2018-02-09 PROCEDURE — 1123F ACP DISCUSS/DSCN MKR DOCD: CPT | Performed by: INTERNAL MEDICINE

## 2018-02-09 PROCEDURE — G8427 DOCREV CUR MEDS BY ELIG CLIN: HCPCS | Performed by: INTERNAL MEDICINE

## 2018-02-09 PROCEDURE — 4040F PNEUMOC VAC/ADMIN/RCVD: CPT | Performed by: INTERNAL MEDICINE

## 2018-02-09 PROCEDURE — 99214 OFFICE O/P EST MOD 30 MIN: CPT | Performed by: INTERNAL MEDICINE

## 2018-02-09 PROCEDURE — 3017F COLORECTAL CA SCREEN DOC REV: CPT | Performed by: INTERNAL MEDICINE

## 2018-02-09 PROCEDURE — G8417 CALC BMI ABV UP PARAM F/U: HCPCS | Performed by: INTERNAL MEDICINE

## 2018-02-09 PROCEDURE — 1036F TOBACCO NON-USER: CPT | Performed by: INTERNAL MEDICINE

## 2018-02-09 PROCEDURE — G8484 FLU IMMUNIZE NO ADMIN: HCPCS | Performed by: INTERNAL MEDICINE

## 2018-02-09 NOTE — LETTER
[address] Cardiology - [facility]  Phone: [phone]  Fax: [phone]    Kraig Sutton MD        February 13, 2018     Ernestina Alvarez, [address] [facility] [address]    Patient: May Bansal  MR Number: [id number]  YOB: 1945  Date of Visit: 2/9/2018    Dear Dr. Ernestina Alvarez: Thank you for allowing me to participate in the care of Carlos Barger. Below are the relevant portions of my assessment and plan of care. Assessment  1. Paroxysmal atrial fibrillation- fairly constant AF burden at about 20%, but the majority of the episodes are brief in duration, lasting only a few minutes. He has minimal symptoms attributable to the AF at this time. 2. S/P AVR: porcine    3. 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 Continue amiodarone 100 mg.  2. INR goal 2.0-3.0 in the setting of PAF and aortic valve replacement. 3. Discussed the risks and benefits of waiting for ablation. 4. Remain as active as possible. 5. Follow up with me in 6 months. If you have questions, please do not hesitate to call me. I look forward to following Kenny Delacruz along with you.     Sincerely,        Kraig Sutton MD

## 2018-02-09 NOTE — PATIENT INSTRUCTIONS
Plan:  1. Continue amiodarone 100 mg.  2. INR goal 2.0-3.0 in the setting of PAF and aortic valve replacement. 3. Discussed the risks and benefits of waiting for ablation. 4. Remain as active as possible. 5. Follow up with me in 6 months.

## 2018-03-06 ENCOUNTER — ANTI-COAG VISIT (OUTPATIENT)
Dept: CARDIOLOGY CLINIC | Age: 73
End: 2018-03-06

## 2018-03-06 ENCOUNTER — TELEPHONE (OUTPATIENT)
Dept: CARDIOLOGY CLINIC | Age: 73
End: 2018-03-06

## 2018-03-06 NOTE — PROGRESS NOTES
3/05 INR 1.72.  Recommend 2.5 mg on tues Provider notified. ~ Cristhian Willard RN     pt doses himself  [phone]

## 2018-03-07 ENCOUNTER — TELEPHONE (OUTPATIENT)
Dept: CARDIOLOGY CLINIC | Age: 73
End: 2018-03-07

## 2018-03-07 NOTE — TELEPHONE ENCOUNTER
Have him take 2.5mg on Wednesday and keep regimen the same otherwise.  Recheck INR 1 week or 10 days from now

## 2018-03-16 RX ORDER — WARFARIN SODIUM 2.5 MG/1
TABLET ORAL
Qty: 90 TABLET | Refills: 3 | Status: SHIPPED | OUTPATIENT
Start: 2018-03-16 | End: 2018-03-16 | Stop reason: SDUPTHER

## 2018-03-16 RX ORDER — WARFARIN SODIUM 2.5 MG/1
TABLET ORAL
Qty: 90 TABLET | Refills: 0 | Status: SHIPPED | OUTPATIENT
Start: 2018-03-16 | End: 2018-12-06 | Stop reason: SDUPTHER

## 2018-03-28 ENCOUNTER — TELEPHONE (OUTPATIENT)
Dept: CARDIOLOGY CLINIC | Age: 73
End: 2018-03-28

## 2018-03-28 ENCOUNTER — ANTI-COAG VISIT (OUTPATIENT)
Dept: CARDIOLOGY CLINIC | Age: 73
End: 2018-03-28

## 2018-03-29 ENCOUNTER — TELEPHONE (OUTPATIENT)
Dept: CARDIOLOGY CLINIC | Age: 73
End: 2018-03-29

## 2018-04-30 ENCOUNTER — ANTI-COAG VISIT (OUTPATIENT)
Dept: CARDIOLOGY CLINIC | Age: 73
End: 2018-04-30

## 2018-04-30 ENCOUNTER — TELEPHONE (OUTPATIENT)
Dept: CARDIOLOGY CLINIC | Age: 73
End: 2018-04-30

## 2018-04-30 LAB — INR BLD: 2.37

## 2018-05-22 ENCOUNTER — NURSE ONLY (OUTPATIENT)
Dept: CARDIOLOGY CLINIC | Age: 73
End: 2018-05-22

## 2018-05-22 DIAGNOSIS — I44.2 ATRIOVENTRICULAR BLOCK, COMPLETE (HCC): Chronic | ICD-10-CM

## 2018-05-22 DIAGNOSIS — Z95.0 CARDIAC PACEMAKER IN SITU: Primary | ICD-10-CM

## 2018-05-22 PROCEDURE — 93294 REM INTERROG EVL PM/LDLS PM: CPT | Performed by: INTERNAL MEDICINE

## 2018-05-22 PROCEDURE — 93296 REM INTERROG EVL PM/IDS: CPT | Performed by: INTERNAL MEDICINE

## 2018-05-29 LAB — INR BLD: 2.25

## 2018-05-30 ENCOUNTER — TELEPHONE (OUTPATIENT)
Dept: CARDIOLOGY CLINIC | Age: 73
End: 2018-05-30

## 2018-05-30 ENCOUNTER — ANTI-COAG VISIT (OUTPATIENT)
Dept: CARDIOLOGY CLINIC | Age: 73
End: 2018-05-30

## 2018-06-14 ENCOUNTER — TELEPHONE (OUTPATIENT)
Dept: CARDIOLOGY CLINIC | Age: 73
End: 2018-06-14

## 2018-06-14 ENCOUNTER — ANTI-COAG VISIT (OUTPATIENT)
Dept: CARDIOLOGY CLINIC | Age: 73
End: 2018-06-14

## 2018-06-14 LAB — INR BLD: 2.5

## 2018-06-18 ENCOUNTER — OFFICE VISIT (OUTPATIENT)
Dept: CARDIOLOGY CLINIC | Age: 73
End: 2018-06-18

## 2018-06-18 VITALS
DIASTOLIC BLOOD PRESSURE: 66 MMHG | WEIGHT: 186 LBS | HEIGHT: 70 IN | BODY MASS INDEX: 26.63 KG/M2 | HEART RATE: 110 BPM | SYSTOLIC BLOOD PRESSURE: 112 MMHG | OXYGEN SATURATION: 98 %

## 2018-06-18 DIAGNOSIS — I48.0 PAROXYSMAL ATRIAL FIBRILLATION (HCC): Primary | Chronic | ICD-10-CM

## 2018-06-18 DIAGNOSIS — Z95.2 S/P AVR (AORTIC VALVE REPLACEMENT): Chronic | ICD-10-CM

## 2018-06-18 DIAGNOSIS — I10 ESSENTIAL HYPERTENSION: Chronic | ICD-10-CM

## 2018-06-18 DIAGNOSIS — E78.2 MIXED HYPERLIPIDEMIA: Chronic | ICD-10-CM

## 2018-06-18 DIAGNOSIS — Z95.0 CARDIAC PACEMAKER IN SITU: ICD-10-CM

## 2018-06-18 PROCEDURE — G8427 DOCREV CUR MEDS BY ELIG CLIN: HCPCS | Performed by: INTERNAL MEDICINE

## 2018-06-18 PROCEDURE — 1036F TOBACCO NON-USER: CPT | Performed by: INTERNAL MEDICINE

## 2018-06-18 PROCEDURE — 3017F COLORECTAL CA SCREEN DOC REV: CPT | Performed by: INTERNAL MEDICINE

## 2018-06-18 PROCEDURE — G8417 CALC BMI ABV UP PARAM F/U: HCPCS | Performed by: INTERNAL MEDICINE

## 2018-06-18 PROCEDURE — 1123F ACP DISCUSS/DSCN MKR DOCD: CPT | Performed by: INTERNAL MEDICINE

## 2018-06-18 PROCEDURE — 99214 OFFICE O/P EST MOD 30 MIN: CPT | Performed by: INTERNAL MEDICINE

## 2018-06-18 PROCEDURE — 4040F PNEUMOC VAC/ADMIN/RCVD: CPT | Performed by: INTERNAL MEDICINE

## 2018-07-02 ENCOUNTER — ANTI-COAG VISIT (OUTPATIENT)
Dept: CARDIOLOGY CLINIC | Age: 73
End: 2018-07-02

## 2018-07-02 LAB — INR BLD: 2.5

## 2018-07-10 DIAGNOSIS — I48.0 PAROXYSMAL ATRIAL FIBRILLATION (HCC): Chronic | ICD-10-CM

## 2018-07-10 DIAGNOSIS — Z95.0 CARDIAC PACEMAKER IN SITU: ICD-10-CM

## 2018-07-11 NOTE — TELEPHONE ENCOUNTER
I called Dr. Destinee Harris office again for the labs. They said the answering machine is not monitored. I asked them to fax the labs again. Labs received drawn CMP, lipids,TSH, Thyroxine, T3 uptake, free tyroxine index, CBC, and INR drawn on 1/11/18.

## 2018-07-11 NOTE — TELEPHONE ENCOUNTER
I spoke with the patient and he said he gets Amiodarone 200mg tablets daily script and breaks them in half due to cost.  He also said Dr. Jacinta William had drawn labs in February. I called Dr. Ashley Mcknight office and left a message asking for the labs to be faxed to [phone]. The patient needs a BMP, LFT, and TSH drawn every 6 months for a refill.

## 2018-07-13 ENCOUNTER — TELEPHONE (OUTPATIENT)
Dept: CARDIOLOGY CLINIC | Age: 73
End: 2018-07-13

## 2018-07-13 DIAGNOSIS — I48.0 PAROXYSMAL ATRIAL FIBRILLATION (HCC): Primary | Chronic | ICD-10-CM

## 2018-07-13 LAB
AGE TIME: 72 YRS
ALBUMIN SERPL-MCNC: 4.1 G/DL
ALP BLD-CCNC: 81 U/L
ALT SERPL-CCNC: 57 U/L
ANION GAP SERPL CALCULATED.3IONS-SCNC: 10 MMOL/L
AST SERPL-CCNC: 43 U/L
BASIC METABOLIC PANEL: ABNORMAL
BILIRUB SERPL-MCNC: 0.2 MG/DL
BILIRUB SERPL-MCNC: 0.85 MG/DL
BUN BLDV-MCNC: 29 MG/DL
CALCIUM SERPL-MCNC: 9 MG/DL
CHLORIDE BLD-SCNC: 101 MMOL/L
CO2: 29.5 MMOL/L
CREAT SERPL-MCNC: 1.39 MG/DL
GFR AFRICAN AMERICAN: 61 ML/MIN
GFR NON-AFRICAN AMERICAN: 50 ML/MIN
GLUCOSE BLD-MCNC: 112 MG/DL
HEPATIC FUNCTION PANEL: NORMAL
POTASSIUM SERPL-SCNC: 4.3 MMOL/L
SODIUM BLD-SCNC: 140 MMOL/L
THYROTROPIN RELEASING HORMONE: 1.72 UIU/ML
TOTAL PROTEIN: 7.3 G/DL

## 2018-07-13 RX ORDER — AMIODARONE HYDROCHLORIDE 200 MG/1
200 TABLET ORAL DAILY
Qty: 90 TABLET | Refills: 0 | Status: SHIPPED | OUTPATIENT
Start: 2018-07-13 | End: 2018-12-06 | Stop reason: SDUPTHER

## 2018-07-13 NOTE — TELEPHONE ENCOUNTER
I spoke to the patient and he said he has enough Amiodarone to last through the end of July. I told him he will need a BMP, TSH, and LFT drawn. I told him we would fax the orders to [facility] per his request.  He will have the labs drawn later today. The office will send in the script in once his labs have been resulted. He verbalized understanding of all of the above.

## 2018-07-16 ENCOUNTER — ANTI-COAG VISIT (OUTPATIENT)
Dept: CARDIOLOGY CLINIC | Age: 73
End: 2018-07-16

## 2018-07-16 LAB — INR BLD: 2.3

## 2018-07-30 ENCOUNTER — ANTI-COAG VISIT (OUTPATIENT)
Dept: CARDIOLOGY CLINIC | Age: 73
End: 2018-07-30

## 2018-07-30 LAB — INR BLD: 2.4

## 2018-08-10 ENCOUNTER — PROCEDURE VISIT (OUTPATIENT)
Dept: CARDIOLOGY CLINIC | Age: 73
End: 2018-08-10

## 2018-08-10 ENCOUNTER — OFFICE VISIT (OUTPATIENT)
Dept: CARDIOLOGY CLINIC | Age: 73
End: 2018-08-10

## 2018-08-10 VITALS
HEIGHT: 70 IN | HEART RATE: 60 BPM | WEIGHT: 186 LBS | SYSTOLIC BLOOD PRESSURE: 104 MMHG | DIASTOLIC BLOOD PRESSURE: 66 MMHG | BODY MASS INDEX: 26.63 KG/M2

## 2018-08-10 DIAGNOSIS — Z95.0 CARDIAC PACEMAKER IN SITU: ICD-10-CM

## 2018-08-10 DIAGNOSIS — I48.0 PAROXYSMAL ATRIAL FIBRILLATION (HCC): Primary | Chronic | ICD-10-CM

## 2018-08-10 DIAGNOSIS — I44.2 ATRIOVENTRICULAR BLOCK, COMPLETE (HCC): Chronic | ICD-10-CM

## 2018-08-10 PROCEDURE — 1036F TOBACCO NON-USER: CPT | Performed by: INTERNAL MEDICINE

## 2018-08-10 PROCEDURE — G8417 CALC BMI ABV UP PARAM F/U: HCPCS | Performed by: INTERNAL MEDICINE

## 2018-08-10 PROCEDURE — G8427 DOCREV CUR MEDS BY ELIG CLIN: HCPCS | Performed by: INTERNAL MEDICINE

## 2018-08-10 PROCEDURE — 93280 PM DEVICE PROGR EVAL DUAL: CPT | Performed by: INTERNAL MEDICINE

## 2018-08-10 PROCEDURE — 99214 OFFICE O/P EST MOD 30 MIN: CPT | Performed by: INTERNAL MEDICINE

## 2018-08-10 PROCEDURE — 3017F COLORECTAL CA SCREEN DOC REV: CPT | Performed by: INTERNAL MEDICINE

## 2018-08-10 PROCEDURE — 4040F PNEUMOC VAC/ADMIN/RCVD: CPT | Performed by: INTERNAL MEDICINE

## 2018-08-10 PROCEDURE — 1123F ACP DISCUSS/DSCN MKR DOCD: CPT | Performed by: INTERNAL MEDICINE

## 2018-08-10 PROCEDURE — 1101F PT FALLS ASSESS-DOCD LE1/YR: CPT | Performed by: INTERNAL MEDICINE

## 2018-08-10 NOTE — PROGRESS NOTES
[address]  Electrophysiology  Follow Up      HPI:  Cassandra Haynes is a 67 y.o. male with a history of permanent pacemaker for temporary AV block post  AVR (porcine), [place], and closure of VSD in 12/06. He had a radiofrequency catheter ablation for atrial flutter on 12/28/12. He has a history of PAF. Amiodarone was increased from 150 mg to 200 mg daily. His INRs are monitored through our office. His device check 9/1/17 shows normal pacing and sensing  function, A fib burden is 17%, decreased from 19% in December. 85% the episodes are less than 1 hour, 66% were less than 5 minutes, and 40% were less than 1 minute. EKG shows sinus rhythm rate 60. His device check 11/10/17, all sensing and pacing parameters are within normal range. AF burden 20%. 62% of the episodes were less than 5 minutes in duration. Today he is here for follow up for device management PAF. He is S/P AVR with porcine valve and underwent permanent pacemaker for temporary AV block post  AVR (porcine), [place], and closure of VSD in 12/06. He had a radiofrequency catheter ablation for atrial flutter on 12/28/12. His device check today shows normal functions. He had a fib for nine hours and then yesterday for 16 hours. His a fib burden is now up to 38%. He states he was unaware of his arrhythmia. He was busy last night going out to eat and then mowing the yard. He states it is rare for him to know that he is out of rhythm. If he does feels the AF, it occurs in the late evening before he goes to bed. His activity is not limited by this and he states he feels well. He denies chest pain, SOB, dizziness, or syncope. LFT's were normal in 7/2017. Past Medical History:  has a past medical history of Arthritis; Atrial fibrillation ([address] Cardiac pacemaker; and Mitral valve disease. Surgical History: has a past surgical history that includes Cardiac surgery (2009); cyst removal (1972); skin biopsy;  Pacemaker inferior infarct. Objective:  Physical Exam   Constitutional: He is oriented to person, place, and time. He appears well-developed and well-nourished. HENT:   Head: Normocephalic and atraumatic. Eyes: Pupils are equal, round, and reactive to light. Neck: Normal range of motion. Cardiovascular: Normal rate,  regular rhythm, 2/6 DIXIE   Pulmonary/Chest: Effort normal and breath sounds normal.   Abdominal: Soft. No tenderness. Musculoskeletal: Normal range of motion. He exhibits edema in right leg below the knee, non tender. Neurological: He is alert and oriented to person, place, and time. Skin: Skin is warm and dry. Psychiatric: He has a normal mood and affect. Assessment  1. Paroxysmal atrial fibrillation- AF burden has increased to about 38%, but he has minimal symptoms attributable to the AF at this time. 2. S/P AVR: porcine    3. S/P a flutter ablation 12/2012    Plan:  1. Continue amiodarone 100 mg.  2. Continue warfarin, INR goal 2.0-3.0 in the setting of PAF and aortic valve replacement. 3. Discussed the risks and benefits of waiting for ablation. 4. Remain as active as possible. 5. Follow up with me in 6 months.        Glen Davis M.D.

## 2018-08-13 ENCOUNTER — ANTI-COAG VISIT (OUTPATIENT)
Dept: CARDIOLOGY CLINIC | Age: 73
End: 2018-08-13

## 2018-08-13 LAB — INR BLD: 2.8

## 2018-08-13 NOTE — PROGRESS NOTES
08/13. INR 2.80. Provider notified. ~ Dedra Sung RN     pt doses himself  [phone].  Dr. Andi Lomeli wants 2.0-3.0

## 2018-08-27 LAB — INR BLD: 2.1

## 2018-08-29 ENCOUNTER — ANTI-COAG VISIT (OUTPATIENT)
Dept: CARDIOLOGY CLINIC | Age: 73
End: 2018-08-29

## 2018-09-11 ENCOUNTER — ANTI-COAG VISIT (OUTPATIENT)
Dept: CARDIOLOGY CLINIC | Age: 73
End: 2018-09-11

## 2018-09-11 LAB — INR BLD: 2.4

## 2018-09-11 NOTE — PROGRESS NOTES
09/11/18 INR 2.40. Provider notified. ~ Burak Kaplan RN     pt doses himself  [phone].  Dr. Adams Sensing wants 2.0-3.0

## 2018-09-24 LAB — INR BLD: 2.6

## 2018-09-26 ENCOUNTER — ANTI-COAG VISIT (OUTPATIENT)
Dept: CARDIOLOGY CLINIC | Age: 73
End: 2018-09-26

## 2018-10-08 ENCOUNTER — ANTI-COAG VISIT (OUTPATIENT)
Dept: CARDIOLOGY CLINIC | Age: 73
End: 2018-10-08

## 2018-10-08 LAB — INR BLD: 2.3

## 2018-10-08 NOTE — PROGRESS NOTES
10/08/18 INR 2.3. Provider notified. ~ Arnaud Buys RN     pt doses himself  [phone].  Dr. Fer Eller wants 2.0-3.0

## 2018-10-22 ENCOUNTER — ANTI-COAG VISIT (OUTPATIENT)
Dept: CARDIOLOGY CLINIC | Age: 73
End: 2018-10-22

## 2018-10-22 LAB — INR BLD: 2.2

## 2018-11-06 ENCOUNTER — TELEPHONE (OUTPATIENT)
Dept: CARDIOLOGY CLINIC | Age: 73
End: 2018-11-06

## 2018-11-06 DIAGNOSIS — Z79.899 MEDICATION MANAGEMENT: Primary | ICD-10-CM

## 2018-11-06 NOTE — TELEPHONE ENCOUNTER
Pt called relayed Roxanne's message. Pt stated that he would prefer to have this lab order mailed to him. I placed lab order in outgoing mail. Pt stated that he is going out of town December 12th and would like to know how to have his INR tested while out of town. I advised pt that he could go to the nearest outpatient lab. Pt stated that he was unsure of this. Pt would like a call from a nurse.

## 2018-11-20 ENCOUNTER — TELEPHONE (OUTPATIENT)
Dept: CARDIOLOGY CLINIC | Age: 73
End: 2018-11-20

## 2018-11-20 ENCOUNTER — ANTI-COAG VISIT (OUTPATIENT)
Dept: CARDIOLOGY CLINIC | Age: 73
End: 2018-11-20

## 2018-11-27 ENCOUNTER — NURSE ONLY (OUTPATIENT)
Dept: CARDIOLOGY CLINIC | Age: 73
End: 2018-11-27
Payer: MEDICARE

## 2018-11-27 DIAGNOSIS — Z95.0 CARDIAC PACEMAKER IN SITU: ICD-10-CM

## 2018-11-27 DIAGNOSIS — I44.2 ATRIOVENTRICULAR BLOCK, COMPLETE (HCC): Chronic | ICD-10-CM

## 2018-12-05 LAB — INR BLD: 1.9

## 2018-12-06 ENCOUNTER — HOSPITAL ENCOUNTER (OUTPATIENT)
Age: 73
Discharge: HOME | End: 2018-12-06
Payer: MEDICARE

## 2018-12-06 ENCOUNTER — OFFICE VISIT (OUTPATIENT)
Dept: CARDIOLOGY CLINIC | Age: 73
End: 2018-12-06
Payer: MEDICARE

## 2018-12-06 VITALS
SYSTOLIC BLOOD PRESSURE: 108 MMHG | BODY MASS INDEX: 26.77 KG/M2 | WEIGHT: 187 LBS | OXYGEN SATURATION: 94 % | DIASTOLIC BLOOD PRESSURE: 62 MMHG | HEIGHT: 70 IN | HEART RATE: 119 BPM

## 2018-12-06 DIAGNOSIS — I10 ESSENTIAL HYPERTENSION: Chronic | ICD-10-CM

## 2018-12-06 DIAGNOSIS — I48.0 PAROXYSMAL ATRIAL FIBRILLATION (HCC): Primary | Chronic | ICD-10-CM

## 2018-12-06 DIAGNOSIS — Z95.2 S/P AVR (AORTIC VALVE REPLACEMENT): Chronic | ICD-10-CM

## 2018-12-06 DIAGNOSIS — I20.0 UNSTABLE ANGINA PECTORIS (HCC): ICD-10-CM

## 2018-12-06 DIAGNOSIS — Z95.0 CARDIAC PACEMAKER IN SITU: Chronic | ICD-10-CM

## 2018-12-06 DIAGNOSIS — E78.2 MIXED HYPERLIPIDEMIA: Chronic | ICD-10-CM

## 2018-12-06 LAB
EKG ATRIAL RATE: 130 BPM
EKG DIAGNOSIS: NORMAL
EKG Q-T INTERVAL: 328 MS
EKG QRS DURATION: 102 MS
EKG QTC CALCULATION (BAZETT): 439 MS
EKG R AXIS: -58 DEGREES
EKG T AXIS: 87 DEGREES
EKG VENTRICULAR RATE: 108 BPM

## 2018-12-06 PROCEDURE — 93010 ELECTROCARDIOGRAM REPORT: CPT | Performed by: INTERNAL MEDICINE

## 2018-12-06 PROCEDURE — 4040F PNEUMOC VAC/ADMIN/RCVD: CPT | Performed by: INTERNAL MEDICINE

## 2018-12-06 PROCEDURE — 99214 OFFICE O/P EST MOD 30 MIN: CPT | Performed by: INTERNAL MEDICINE

## 2018-12-06 PROCEDURE — 1123F ACP DISCUSS/DSCN MKR DOCD: CPT | Performed by: INTERNAL MEDICINE

## 2018-12-06 PROCEDURE — G8598 ASA/ANTIPLAT THER USED: HCPCS | Performed by: INTERNAL MEDICINE

## 2018-12-06 PROCEDURE — G8484 FLU IMMUNIZE NO ADMIN: HCPCS | Performed by: INTERNAL MEDICINE

## 2018-12-06 PROCEDURE — 93005 ELECTROCARDIOGRAM TRACING: CPT | Performed by: INTERNAL MEDICINE

## 2018-12-06 PROCEDURE — 3017F COLORECTAL CA SCREEN DOC REV: CPT | Performed by: INTERNAL MEDICINE

## 2018-12-06 PROCEDURE — G8417 CALC BMI ABV UP PARAM F/U: HCPCS | Performed by: INTERNAL MEDICINE

## 2018-12-06 PROCEDURE — 1036F TOBACCO NON-USER: CPT | Performed by: INTERNAL MEDICINE

## 2018-12-06 PROCEDURE — 1101F PT FALLS ASSESS-DOCD LE1/YR: CPT | Performed by: INTERNAL MEDICINE

## 2018-12-06 PROCEDURE — 93000 ELECTROCARDIOGRAM COMPLETE: CPT | Performed by: INTERNAL MEDICINE

## 2018-12-06 PROCEDURE — G8427 DOCREV CUR MEDS BY ELIG CLIN: HCPCS | Performed by: INTERNAL MEDICINE

## 2018-12-06 RX ORDER — FUROSEMIDE 40 MG/1
TABLET ORAL
Qty: 90 TABLET | Refills: 3 | Status: SHIPPED | OUTPATIENT
Start: 2018-12-06 | End: 2019-12-12 | Stop reason: SDUPTHER

## 2018-12-06 RX ORDER — DILTIAZEM HYDROCHLORIDE 180 MG/1
CAPSULE, COATED, EXTENDED RELEASE ORAL
Qty: 90 CAPSULE | Refills: 3 | Status: ON HOLD | OUTPATIENT
Start: 2018-12-06 | End: 2019-01-11

## 2018-12-06 RX ORDER — WARFARIN SODIUM 2.5 MG/1
TABLET ORAL
Qty: 90 TABLET | Refills: 1 | Status: SHIPPED | OUTPATIENT
Start: 2018-12-06 | End: 2019-05-22 | Stop reason: SDUPTHER

## 2018-12-06 RX ORDER — METOPROLOL SUCCINATE 50 MG/1
TABLET, EXTENDED RELEASE ORAL
Qty: 90 TABLET | Refills: 3 | Status: ON HOLD | OUTPATIENT
Start: 2018-12-06 | End: 2019-02-14 | Stop reason: HOSPADM

## 2018-12-06 RX ORDER — AMIODARONE HYDROCHLORIDE 200 MG/1
200 TABLET ORAL DAILY
Qty: 90 TABLET | Refills: 3 | Status: SHIPPED | OUTPATIENT
Start: 2018-12-06 | End: 2019-01-16 | Stop reason: DRUGHIGH

## 2018-12-06 RX ORDER — ASPIRIN 81 MG/1
81 TABLET ORAL DAILY
Qty: 90 TABLET | Refills: 3 | Status: SHIPPED | OUTPATIENT
Start: 2018-12-06

## 2018-12-06 NOTE — PROGRESS NOTES
with stable sensing and pacing thresholds (note May 2018 had 33% afib burden). Today he reports to feeling OK from cardiac standpoint. He reports for last 8 weeks he has mild chest heaviness with walking on treadmill. Will have mild heaviness for first few minutes and then resolves and walks 20 minutes without difficulty. Works out at [employer] early AM. He occasionally feels afib with \"palpitations. \"  Patient with no complaints of chest pain, SOB, dizziness,  or orthopnea/PND. 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 He reports he is leaving on Sunday for [place] until January    Past Medical History:   has a past medical history of Arthritis; Atrial fibrillation ([address] Cardiac pacemaker; and Mitral valve disease. Surgical History:   has a past surgical history that includes Cardiac surgery (2009); cyst removal (1972); skin biopsy; Pacemaker insertion; Atrial ablation surgery (12-28-12); and ventral hernia repair. Social History:   , lives with spouse in hospitals. He is retired "[employer] and current  [facility] reports that he has never smoked. He has never used smokeless tobacco. He reports that he does not drink alcohol or use drugs. Family History:  family history includes Cancer in his mother. Home Medications:  Prior to Admission medications    Medication Sig Start Date End Date Taking?  Authorizing Provider   amiodarone (CORDARONE) 200 MG tablet Take 1 tablet by mouth daily Take 1 tablet by mouth  daily  Patient taking differently: Take 100 mg by mouth daily Take 1 tablet by mouth  daily 7/13/18  Yes Cayden Garcia MD   warfarin (COUMADIN) 2.5 MG tablet One tab by mouth daily or as directed 3/16/18  Yes Dustin Maher MD   furosemide (LASIX) 40 MG tablet Take 1 tablet by mouth  daily 1/22/18  Yes Lisa Jimenez MD   diltiazem (CARDIZEM CD) 180 MG extended release capsule Take 1 capsule by mouth  daily chronically elevated LFT's. Most recent EKG 8/10/18 NSR; Incomplete RBBB; inferior infarct pattern. Most recent device check 8/10/18 device shows normal function, with stable sensing and pacing thresholds (note May 2018 study with 33% afib burden). He sees Dr. Sly Harris in Jan or Feb. 2019.       3. Essential hypertension:  Well controlled and will continue current medical regimen. 4. Mixed hyperlipidemia: He is NOT on statin med per Dr. Caty Grey due to elevated LFT's on amiodarone and he does not want to take them. No documented hx CAD. Encouraged good diet as best possible. I personally reviewed most recent labs from 1/18 showing EG=126 and QBQ=101 with HDL=74. I have discussed PCSK9 inhibitors but he is not inclined to change regimen at this time. 5.     CP:  ? Angina or unspecified pain. Initial treadmill has CP but then resolves and walks without difficulty. No cardiac testing since 2016 and non-invasive cardiac testing is warranted. Also want to check porcine AVR and LVEF to see if change from 2015 study. Plan:  1. OK to take extra dose of Lasix with swelling in legs and feet  2. Will order ECHO. Note EKG in office today shows  3. Follow up with Dr. Sly Harris next available and myself  in 6 months  4. Risk factor modification was discussed including the importance and management of lipids, BP, diet, exercise,    5. meds refilled as warranted  6. I will order a lexiscan nuclear stress test to assess myocardial perfusion and LV function. He reports he is leaving on Sunday for [place] until January and will schedule when he returns. Does not want to schedule now and I warned him to get evaluation in [place] if CP increases in severity and/or frequency. Cost of prescription medications and patient compliance have been reviewed with patient. All questions answered. Thank you for allowing me to participate in the care of this individual.      Macy Dueñas.  Lucita Oneill M.D., [facility]

## 2018-12-07 ENCOUNTER — ANTI-COAG VISIT (OUTPATIENT)
Dept: CARDIOLOGY CLINIC | Age: 73
End: 2018-12-07

## 2018-12-10 PROCEDURE — 93294 REM INTERROG EVL PM/LDLS PM: CPT | Performed by: INTERNAL MEDICINE

## 2018-12-10 PROCEDURE — 93296 REM INTERROG EVL PM/IDS: CPT | Performed by: INTERNAL MEDICINE

## 2018-12-19 LAB — INR BLD: 2.2

## 2018-12-20 ENCOUNTER — ANTI-COAG VISIT (OUTPATIENT)
Dept: CARDIOLOGY CLINIC | Age: 73
End: 2018-12-20

## 2019-01-04 ENCOUNTER — OFFICE VISIT (OUTPATIENT)
Dept: CARDIOLOGY CLINIC | Age: 74
End: 2019-01-04
Payer: MEDICARE

## 2019-01-04 VITALS
BODY MASS INDEX: 26.63 KG/M2 | HEART RATE: 101 BPM | WEIGHT: 186 LBS | SYSTOLIC BLOOD PRESSURE: 100 MMHG | HEIGHT: 70 IN | DIASTOLIC BLOOD PRESSURE: 72 MMHG

## 2019-01-04 DIAGNOSIS — I48.0 PAROXYSMAL ATRIAL FIBRILLATION (HCC): Primary | Chronic | ICD-10-CM

## 2019-01-04 DIAGNOSIS — I44.2 ATRIOVENTRICULAR BLOCK, COMPLETE (HCC): Chronic | ICD-10-CM

## 2019-01-04 DIAGNOSIS — Z95.0 CARDIAC PACEMAKER IN SITU: ICD-10-CM

## 2019-01-04 DIAGNOSIS — Z95.2 S/P AVR (AORTIC VALVE REPLACEMENT): Chronic | ICD-10-CM

## 2019-01-04 PROCEDURE — 4040F PNEUMOC VAC/ADMIN/RCVD: CPT | Performed by: INTERNAL MEDICINE

## 2019-01-04 PROCEDURE — G8427 DOCREV CUR MEDS BY ELIG CLIN: HCPCS | Performed by: INTERNAL MEDICINE

## 2019-01-04 PROCEDURE — 1123F ACP DISCUSS/DSCN MKR DOCD: CPT | Performed by: INTERNAL MEDICINE

## 2019-01-04 PROCEDURE — 3017F COLORECTAL CA SCREEN DOC REV: CPT | Performed by: INTERNAL MEDICINE

## 2019-01-04 PROCEDURE — G8417 CALC BMI ABV UP PARAM F/U: HCPCS | Performed by: INTERNAL MEDICINE

## 2019-01-04 PROCEDURE — 1036F TOBACCO NON-USER: CPT | Performed by: INTERNAL MEDICINE

## 2019-01-04 PROCEDURE — 1101F PT FALLS ASSESS-DOCD LE1/YR: CPT | Performed by: INTERNAL MEDICINE

## 2019-01-04 PROCEDURE — G8484 FLU IMMUNIZE NO ADMIN: HCPCS | Performed by: INTERNAL MEDICINE

## 2019-01-04 PROCEDURE — 99214 OFFICE O/P EST MOD 30 MIN: CPT | Performed by: INTERNAL MEDICINE

## 2019-01-04 PROCEDURE — 93280 PM DEVICE PROGR EVAL DUAL: CPT | Performed by: INTERNAL MEDICINE

## 2019-01-09 ENCOUNTER — HOSPITAL ENCOUNTER (OUTPATIENT)
Dept: NUCLEAR MEDICINE | Age: 74
Discharge: HOME | End: 2019-01-09
Payer: MEDICARE

## 2019-01-09 ENCOUNTER — HOSPITAL ENCOUNTER (OUTPATIENT)
Dept: NON INVASIVE DIAGNOSTICS | Age: 74
Discharge: HOME | End: 2019-01-09
Payer: MEDICARE

## 2019-01-09 ENCOUNTER — HOSPITAL ENCOUNTER (EMERGENCY)
Age: 74
Discharge: OTHER INSTITUTION | End: 2019-01-09
Attending: EMERGENCY MEDICINE
Payer: MEDICARE

## 2019-01-09 ENCOUNTER — HOSPITAL ENCOUNTER (INPATIENT)
Age: 74
LOS: 2 days | Discharge: HOME | DRG: 287 | End: 2019-01-11
Attending: INTERNAL MEDICINE | Admitting: INTERNAL MEDICINE
Payer: MEDICARE

## 2019-01-09 VITALS
HEIGHT: 70 IN | HEART RATE: 95 BPM | TEMPERATURE: 97.7 F | SYSTOLIC BLOOD PRESSURE: 110 MMHG | BODY MASS INDEX: 26.77 KG/M2 | WEIGHT: 187 LBS | RESPIRATION RATE: 23 BRPM | OXYGEN SATURATION: 96 % | DIASTOLIC BLOOD PRESSURE: 75 MMHG

## 2019-01-09 DIAGNOSIS — I20.0 UNSTABLE ANGINA PECTORIS (HCC): ICD-10-CM

## 2019-01-09 DIAGNOSIS — I48.91 ATRIAL FIBRILLATION WITH RVR (HCC): Primary | ICD-10-CM

## 2019-01-09 DIAGNOSIS — R94.39 ABNORMAL STRESS TEST: ICD-10-CM

## 2019-01-09 LAB
A/G RATIO: 1.4 (ref 1.1–2.2)
ALBUMIN SERPL-MCNC: 4.3 G/DL (ref 3.4–5)
ALP BLD-CCNC: 78 U/L (ref 40–129)
ALT SERPL-CCNC: 23 U/L (ref 10–40)
ANION GAP SERPL CALCULATED.3IONS-SCNC: 11 MMOL/L (ref 3–16)
AST SERPL-CCNC: 33 U/L (ref 15–37)
BASOPHILS ABSOLUTE: 0 K/UL (ref 0–0.2)
BASOPHILS RELATIVE PERCENT: 0.8 %
BILIRUB SERPL-MCNC: 1 MG/DL (ref 0–1)
BUN BLDV-MCNC: 36 MG/DL (ref 7–20)
CALCIUM SERPL-MCNC: 9.9 MG/DL (ref 8.3–10.6)
CHLORIDE BLD-SCNC: 100 MMOL/L (ref 99–110)
CO2: 25 MMOL/L (ref 21–32)
CREAT SERPL-MCNC: 1.1 MG/DL (ref 0.8–1.3)
EKG ATRIAL RATE: 119 BPM
EKG DIAGNOSIS: NORMAL
EKG Q-T INTERVAL: 300 MS
EKG QRS DURATION: 98 MS
EKG QTC CALCULATION (BAZETT): 426 MS
EKG R AXIS: -51 DEGREES
EKG T AXIS: 86 DEGREES
EKG VENTRICULAR RATE: 121 BPM
EOSINOPHILS ABSOLUTE: 0.1 K/UL (ref 0–0.6)
EOSINOPHILS RELATIVE PERCENT: 2 %
GFR AFRICAN AMERICAN: >60
GFR NON-AFRICAN AMERICAN: >60
GLOBULIN: 3.1 G/DL
GLUCOSE BLD-MCNC: 112 MG/DL (ref 70–99)
HCT VFR BLD CALC: 43.9 % (ref 40.5–52.5)
HEMOGLOBIN: 14.8 G/DL (ref 13.5–17.5)
INR BLD: 2.5 (ref 0.86–1.14)
LYMPHOCYTES ABSOLUTE: 0.9 K/UL (ref 1–5.1)
LYMPHOCYTES RELATIVE PERCENT: 20 %
MCH RBC QN AUTO: 33.3 PG (ref 26–34)
MCHC RBC AUTO-ENTMCNC: 33.8 G/DL (ref 31–36)
MCV RBC AUTO: 98.3 FL (ref 80–100)
MONOCYTES ABSOLUTE: 0.5 K/UL (ref 0–1.3)
MONOCYTES RELATIVE PERCENT: 11.4 %
NEUTROPHILS ABSOLUTE: 3.1 K/UL (ref 1.7–7.7)
NEUTROPHILS RELATIVE PERCENT: 65.8 %
PDW BLD-RTO: 14 % (ref 12.4–15.4)
PLATELET # BLD: 161 K/UL (ref 135–450)
PMV BLD AUTO: 7.9 FL (ref 5–10.5)
POTASSIUM SERPL-SCNC: 4 MMOL/L (ref 3.5–5.1)
PROTHROMBIN TIME: 28.5 SEC (ref 9.8–13)
RBC # BLD: 4.46 M/UL (ref 4.2–5.9)
SODIUM BLD-SCNC: 136 MMOL/L (ref 136–145)
TOTAL PROTEIN: 7.4 G/DL (ref 6.4–8.2)
TROPONIN: <0.01 NG/ML
TROPONIN: <0.01 NG/ML
WBC # BLD: 4.6 K/UL (ref 4–11)

## 2019-01-09 PROCEDURE — 2500000003 HC RX 250 WO HCPCS: Performed by: NURSE PRACTITIONER

## 2019-01-09 PROCEDURE — 6370000000 HC RX 637 (ALT 250 FOR IP): Performed by: EMERGENCY MEDICINE

## 2019-01-09 PROCEDURE — 2580000003 HC RX 258: Performed by: EMERGENCY MEDICINE

## 2019-01-09 PROCEDURE — 2060000000 HC ICU INTERMEDIATE R&B

## 2019-01-09 PROCEDURE — 2500000003 HC RX 250 WO HCPCS: Performed by: EMERGENCY MEDICINE

## 2019-01-09 PROCEDURE — 96374 THER/PROPH/DIAG INJ IV PUSH: CPT

## 2019-01-09 PROCEDURE — 80053 COMPREHEN METABOLIC PANEL: CPT

## 2019-01-09 PROCEDURE — 2580000003 HC RX 258: Performed by: NURSE PRACTITIONER

## 2019-01-09 PROCEDURE — 84484 ASSAY OF TROPONIN QUANT: CPT

## 2019-01-09 PROCEDURE — A9502 TC99M TETROFOSMIN: HCPCS | Performed by: INTERNAL MEDICINE

## 2019-01-09 PROCEDURE — 36415 COLL VENOUS BLD VENIPUNCTURE: CPT

## 2019-01-09 PROCEDURE — 93017 CV STRESS TEST TRACING ONLY: CPT

## 2019-01-09 PROCEDURE — 99291 CRITICAL CARE FIRST HOUR: CPT

## 2019-01-09 PROCEDURE — 6360000002 HC RX W HCPCS: Performed by: INTERNAL MEDICINE

## 2019-01-09 PROCEDURE — 93005 ELECTROCARDIOGRAM TRACING: CPT | Performed by: EMERGENCY MEDICINE

## 2019-01-09 PROCEDURE — 6370000000 HC RX 637 (ALT 250 FOR IP): Performed by: NURSE PRACTITIONER

## 2019-01-09 PROCEDURE — 85610 PROTHROMBIN TIME: CPT

## 2019-01-09 PROCEDURE — 3430000000 HC RX DIAGNOSTIC RADIOPHARMACEUTICAL: Performed by: INTERNAL MEDICINE

## 2019-01-09 PROCEDURE — 96376 TX/PRO/DX INJ SAME DRUG ADON: CPT

## 2019-01-09 PROCEDURE — 78452 HT MUSCLE IMAGE SPECT MULT: CPT

## 2019-01-09 PROCEDURE — 85025 COMPLETE CBC W/AUTO DIFF WBC: CPT

## 2019-01-09 PROCEDURE — 93010 ELECTROCARDIOGRAM REPORT: CPT | Performed by: INTERNAL MEDICINE

## 2019-01-09 RX ORDER — METOPROLOL TARTRATE 50 MG/1
50 TABLET, FILM COATED ORAL ONCE
Status: COMPLETED | OUTPATIENT
Start: 2019-01-09 | End: 2019-01-09

## 2019-01-09 RX ORDER — ONDANSETRON 2 MG/ML
4 INJECTION INTRAMUSCULAR; INTRAVENOUS EVERY 6 HOURS PRN
Status: DISCONTINUED | OUTPATIENT
Start: 2019-01-09 | End: 2019-01-11 | Stop reason: HOSPADM

## 2019-01-09 RX ORDER — 0.9 % SODIUM CHLORIDE 0.9 %
500 INTRAVENOUS SOLUTION INTRAVENOUS ONCE
Status: COMPLETED | OUTPATIENT
Start: 2019-01-09 | End: 2019-01-09

## 2019-01-09 RX ORDER — DILTIAZEM HYDROCHLORIDE 5 MG/ML
20 INJECTION INTRAVENOUS ONCE
Status: COMPLETED | OUTPATIENT
Start: 2019-01-09 | End: 2019-01-09

## 2019-01-09 RX ORDER — SODIUM CHLORIDE 0.9 % (FLUSH) 0.9 %
10 SYRINGE (ML) INJECTION EVERY 12 HOURS SCHEDULED
Status: DISCONTINUED | OUTPATIENT
Start: 2019-01-09 | End: 2019-01-11 | Stop reason: HOSPADM

## 2019-01-09 RX ORDER — WARFARIN SODIUM 2.5 MG/1
1.25 TABLET ORAL
Status: DISCONTINUED | OUTPATIENT
Start: 2019-01-10 | End: 2019-01-11 | Stop reason: HOSPADM

## 2019-01-09 RX ORDER — DILTIAZEM HYDROCHLORIDE 60 MG/1
30 TABLET, FILM COATED ORAL ONCE
Status: COMPLETED | OUTPATIENT
Start: 2019-01-09 | End: 2019-01-09

## 2019-01-09 RX ORDER — SODIUM CHLORIDE 0.9 % (FLUSH) 0.9 %
10 SYRINGE (ML) INJECTION PRN
Status: DISCONTINUED | OUTPATIENT
Start: 2019-01-09 | End: 2019-01-11 | Stop reason: HOSPADM

## 2019-01-09 RX ORDER — CALCIUM GLUCONATE 94 MG/ML
2 INJECTION, SOLUTION INTRAVENOUS ONCE
Status: DISCONTINUED | OUTPATIENT
Start: 2019-01-09 | End: 2019-01-09 | Stop reason: SDUPTHER

## 2019-01-09 RX ORDER — FINASTERIDE 5 MG/1
5 TABLET, FILM COATED ORAL DAILY
Status: DISCONTINUED | OUTPATIENT
Start: 2019-01-09 | End: 2019-01-09 | Stop reason: HOSPADM

## 2019-01-09 RX ORDER — FUROSEMIDE 40 MG/1
40 TABLET ORAL DAILY
Status: DISCONTINUED | OUTPATIENT
Start: 2019-01-10 | End: 2019-01-11 | Stop reason: HOSPADM

## 2019-01-09 RX ORDER — ASPIRIN 81 MG/1
81 TABLET, CHEWABLE ORAL DAILY
Status: DISCONTINUED | OUTPATIENT
Start: 2019-01-10 | End: 2019-01-09 | Stop reason: SDUPTHER

## 2019-01-09 RX ORDER — METOPROLOL SUCCINATE 50 MG/1
50 TABLET, EXTENDED RELEASE ORAL DAILY
Status: DISCONTINUED | OUTPATIENT
Start: 2019-01-10 | End: 2019-01-11 | Stop reason: HOSPADM

## 2019-01-09 RX ORDER — TAMSULOSIN HYDROCHLORIDE 0.4 MG/1
0.4 CAPSULE ORAL DAILY
Status: DISCONTINUED | OUTPATIENT
Start: 2019-01-10 | End: 2019-01-11 | Stop reason: HOSPADM

## 2019-01-09 RX ORDER — WARFARIN SODIUM 2.5 MG/1
2.5 TABLET ORAL
Status: DISCONTINUED | OUTPATIENT
Start: 2019-01-11 | End: 2019-01-11 | Stop reason: HOSPADM

## 2019-01-09 RX ORDER — DILTIAZEM HYDROCHLORIDE 5 MG/ML
0.25 INJECTION INTRAVENOUS ONCE
Status: COMPLETED | OUTPATIENT
Start: 2019-01-09 | End: 2019-01-09

## 2019-01-09 RX ORDER — ASPIRIN 81 MG/1
81 TABLET ORAL DAILY
Status: DISCONTINUED | OUTPATIENT
Start: 2019-01-10 | End: 2019-01-11 | Stop reason: HOSPADM

## 2019-01-09 RX ORDER — AMIODARONE HYDROCHLORIDE 200 MG/1
200 TABLET ORAL DAILY
Status: DISCONTINUED | OUTPATIENT
Start: 2019-01-09 | End: 2019-01-09 | Stop reason: HOSPADM

## 2019-01-09 RX ORDER — FINASTERIDE 5 MG/1
5 TABLET, FILM COATED ORAL NIGHTLY
Status: DISCONTINUED | OUTPATIENT
Start: 2019-01-09 | End: 2019-01-11 | Stop reason: HOSPADM

## 2019-01-09 RX ORDER — AMIODARONE HYDROCHLORIDE 200 MG/1
200 TABLET ORAL DAILY
Status: DISCONTINUED | OUTPATIENT
Start: 2019-01-10 | End: 2019-01-11 | Stop reason: HOSPADM

## 2019-01-09 RX ORDER — DILTIAZEM HYDROCHLORIDE 180 MG/1
180 CAPSULE, COATED, EXTENDED RELEASE ORAL DAILY
Status: DISCONTINUED | OUTPATIENT
Start: 2019-01-10 | End: 2019-01-11

## 2019-01-09 RX ORDER — DILTIAZEM HYDROCHLORIDE 5 MG/ML
20 INJECTION INTRAVENOUS ONCE
Status: DISCONTINUED | OUTPATIENT
Start: 2019-01-09 | End: 2019-01-09

## 2019-01-09 RX ADMIN — DILTIAZEM HYDROCHLORIDE 21 MG: 5 INJECTION INTRAVENOUS at 12:37

## 2019-01-09 RX ADMIN — TETROFOSMIN 11.6 MILLICURIE: 0.23 INJECTION, POWDER, LYOPHILIZED, FOR SOLUTION INTRAVENOUS at 08:15

## 2019-01-09 RX ADMIN — DILTIAZEM HYDROCHLORIDE 10 MG/HR: 5 INJECTION INTRAVENOUS at 13:32

## 2019-01-09 RX ADMIN — AMIODARONE HYDROCHLORIDE 200 MG: 200 TABLET ORAL at 19:02

## 2019-01-09 RX ADMIN — DILTIAZEM HYDROCHLORIDE 30 MG: 60 TABLET, FILM COATED ORAL at 11:45

## 2019-01-09 RX ADMIN — DILTIAZEM HYDROCHLORIDE 5 MG/HR: 5 INJECTION INTRAVENOUS at 12:29

## 2019-01-09 RX ADMIN — FINASTERIDE 5 MG: 5 TABLET, FILM COATED ORAL at 23:17

## 2019-01-09 RX ADMIN — DILTIAZEM HYDROCHLORIDE 5 MG/HR: 5 INJECTION INTRAVENOUS at 13:41

## 2019-01-09 RX ADMIN — Medication 10 ML: at 23:30

## 2019-01-09 RX ADMIN — DILTIAZEM HYDROCHLORIDE 5 MG/HR: 5 INJECTION INTRAVENOUS at 23:20

## 2019-01-09 RX ADMIN — METOPROLOL TARTRATE 50 MG: 50 TABLET ORAL at 19:02

## 2019-01-09 RX ADMIN — SODIUM CHLORIDE 500 ML: 9 INJECTION, SOLUTION INTRAVENOUS at 11:48

## 2019-01-09 RX ADMIN — DILTIAZEM HYDROCHLORIDE 20 MG: 5 INJECTION INTRAVENOUS at 11:57

## 2019-01-09 RX ADMIN — TETROFOSMIN 32.2 MILLICURIE: 0.23 INJECTION, POWDER, LYOPHILIZED, FOR SOLUTION INTRAVENOUS at 09:40

## 2019-01-09 RX ADMIN — REGADENOSON 0.4 MG: 0.08 INJECTION, SOLUTION INTRAVENOUS at 09:40

## 2019-01-10 ENCOUNTER — HOSPITAL ENCOUNTER (INPATIENT)
Dept: CARDIAC CATH/INVASIVE PROCEDURES | Age: 74
Discharge: HOME | DRG: 287 | End: 2019-01-10
Attending: INTERNAL MEDICINE
Payer: MEDICARE

## 2019-01-10 PROBLEM — Z79.01 CHRONIC ANTICOAGULATION: Status: ACTIVE | Noted: 2019-01-10

## 2019-01-10 LAB
ANION GAP SERPL CALCULATED.3IONS-SCNC: 9 MMOL/L (ref 3–16)
BUN BLDV-MCNC: 28 MG/DL (ref 7–20)
CALCIUM SERPL-MCNC: 8.9 MG/DL (ref 8.3–10.6)
CHLORIDE BLD-SCNC: 104 MMOL/L (ref 99–110)
CO2: 28 MMOL/L (ref 21–32)
CREAT SERPL-MCNC: 1 MG/DL (ref 0.8–1.3)
GFR AFRICAN AMERICAN: >60
GFR NON-AFRICAN AMERICAN: >60
GLUCOSE BLD-MCNC: 82 MG/DL (ref 70–99)
HCT VFR BLD CALC: 40.8 % (ref 40.5–52.5)
HEMOGLOBIN: 13.4 G/DL (ref 13.5–17.5)
INR BLD: 2.75 (ref 0.86–1.14)
LV EF: 55 %
LVEF MODALITY: NORMAL
MCH RBC QN AUTO: 32.4 PG (ref 26–34)
MCHC RBC AUTO-ENTMCNC: 32.9 G/DL (ref 31–36)
MCV RBC AUTO: 98.5 FL (ref 80–100)
PDW BLD-RTO: 14.5 % (ref 12.4–15.4)
PLATELET # BLD: 166 K/UL (ref 135–450)
PMV BLD AUTO: 7.5 FL (ref 5–10.5)
POTASSIUM REFLEX MAGNESIUM: 4.5 MMOL/L (ref 3.5–5.1)
PROTHROMBIN TIME: 31.4 SEC (ref 9.8–13)
RBC # BLD: 4.14 M/UL (ref 4.2–5.9)
SODIUM BLD-SCNC: 141 MMOL/L (ref 136–145)
TROPONIN: <0.01 NG/ML
TSH REFLEX: 3.85 UIU/ML (ref 0.27–4.2)
WBC # BLD: 4.6 K/UL (ref 4–11)

## 2019-01-10 PROCEDURE — 2500000003 HC RX 250 WO HCPCS: Performed by: NURSE PRACTITIONER

## 2019-01-10 PROCEDURE — 85027 COMPLETE CBC AUTOMATED: CPT

## 2019-01-10 PROCEDURE — C8929 TTE W OR WO FOL WCON,DOPPLER: HCPCS

## 2019-01-10 PROCEDURE — 36415 COLL VENOUS BLD VENIPUNCTURE: CPT

## 2019-01-10 PROCEDURE — 2060000000 HC ICU INTERMEDIATE R&B

## 2019-01-10 PROCEDURE — 6370000000 HC RX 637 (ALT 250 FOR IP): Performed by: NURSE PRACTITIONER

## 2019-01-10 PROCEDURE — 2580000003 HC RX 258: Performed by: NURSE PRACTITIONER

## 2019-01-10 PROCEDURE — 93971 EXTREMITY STUDY: CPT

## 2019-01-10 PROCEDURE — 84443 ASSAY THYROID STIM HORMONE: CPT

## 2019-01-10 PROCEDURE — 99223 1ST HOSP IP/OBS HIGH 75: CPT | Performed by: INTERNAL MEDICINE

## 2019-01-10 PROCEDURE — C1894 INTRO/SHEATH, NON-LASER: HCPCS

## 2019-01-10 PROCEDURE — 2709999900 HC NON-CHARGEABLE SUPPLY

## 2019-01-10 PROCEDURE — 2580000003 HC RX 258

## 2019-01-10 PROCEDURE — 84484 ASSAY OF TROPONIN QUANT: CPT

## 2019-01-10 PROCEDURE — C1887 CATHETER, GUIDING: HCPCS

## 2019-01-10 PROCEDURE — 80048 BASIC METABOLIC PNL TOTAL CA: CPT

## 2019-01-10 PROCEDURE — 85610 PROTHROMBIN TIME: CPT

## 2019-01-10 PROCEDURE — C1769 GUIDE WIRE: HCPCS

## 2019-01-10 RX ADMIN — TAMSULOSIN HYDROCHLORIDE 0.4 MG: 0.4 CAPSULE ORAL at 08:38

## 2019-01-10 RX ADMIN — LEVOTHYROXINE SODIUM 75 MCG: 50 TABLET ORAL at 07:41

## 2019-01-10 RX ADMIN — FINASTERIDE 5 MG: 5 TABLET, FILM COATED ORAL at 21:45

## 2019-01-10 RX ADMIN — DILTIAZEM HYDROCHLORIDE 5 MG/HR: 5 INJECTION INTRAVENOUS at 20:38

## 2019-01-10 RX ADMIN — FUROSEMIDE 40 MG: 40 TABLET ORAL at 08:38

## 2019-01-10 RX ADMIN — ASPIRIN 81 MG: 81 TABLET, COATED ORAL at 08:38

## 2019-01-10 RX ADMIN — AMIODARONE HYDROCHLORIDE 200 MG: 200 TABLET ORAL at 08:38

## 2019-01-11 ENCOUNTER — APPOINTMENT (OUTPATIENT)
Dept: CARDIAC CATH/INVASIVE PROCEDURES | Age: 74
DRG: 287 | End: 2019-01-11
Attending: INTERNAL MEDICINE
Payer: MEDICARE

## 2019-01-11 VITALS
HEIGHT: 70 IN | RESPIRATION RATE: 16 BRPM | WEIGHT: 183.1 LBS | DIASTOLIC BLOOD PRESSURE: 67 MMHG | TEMPERATURE: 97.9 F | HEART RATE: 96 BPM | SYSTOLIC BLOOD PRESSURE: 94 MMHG | BODY MASS INDEX: 26.21 KG/M2 | OXYGEN SATURATION: 95 %

## 2019-01-11 LAB
INR BLD: 2.49 (ref 0.86–1.14)
PROTHROMBIN TIME: 28.4 SEC (ref 9.8–13)

## 2019-01-11 PROCEDURE — 4A023N7 MEASUREMENT OF CARDIAC SAMPLING AND PRESSURE, LEFT HEART, PERCUTANEOUS APPROACH: ICD-10-PCS | Performed by: INTERNAL MEDICINE

## 2019-01-11 PROCEDURE — 2500000003 HC RX 250 WO HCPCS

## 2019-01-11 PROCEDURE — 99152 MOD SED SAME PHYS/QHP 5/>YRS: CPT | Performed by: INTERNAL MEDICINE

## 2019-01-11 PROCEDURE — B2151ZZ FLUOROSCOPY OF LEFT HEART USING LOW OSMOLAR CONTRAST: ICD-10-PCS | Performed by: INTERNAL MEDICINE

## 2019-01-11 PROCEDURE — 93458 L HRT ARTERY/VENTRICLE ANGIO: CPT | Performed by: INTERNAL MEDICINE

## 2019-01-11 PROCEDURE — 36415 COLL VENOUS BLD VENIPUNCTURE: CPT

## 2019-01-11 PROCEDURE — 2580000003 HC RX 258

## 2019-01-11 PROCEDURE — 6360000002 HC RX W HCPCS

## 2019-01-11 PROCEDURE — 2580000003 HC RX 258: Performed by: NURSE PRACTITIONER

## 2019-01-11 PROCEDURE — 85610 PROTHROMBIN TIME: CPT

## 2019-01-11 PROCEDURE — 99152 MOD SED SAME PHYS/QHP 5/>YRS: CPT

## 2019-01-11 PROCEDURE — 6370000000 HC RX 637 (ALT 250 FOR IP)

## 2019-01-11 PROCEDURE — 93458 L HRT ARTERY/VENTRICLE ANGIO: CPT

## 2019-01-11 PROCEDURE — 2500000003 HC RX 250 WO HCPCS: Performed by: NURSE PRACTITIONER

## 2019-01-11 PROCEDURE — B2111ZZ FLUOROSCOPY OF MULTIPLE CORONARY ARTERIES USING LOW OSMOLAR CONTRAST: ICD-10-PCS | Performed by: INTERNAL MEDICINE

## 2019-01-11 RX ORDER — SODIUM CHLORIDE 0.9 % (FLUSH) 0.9 %
10 SYRINGE (ML) INJECTION EVERY 12 HOURS SCHEDULED
Status: DISCONTINUED | OUTPATIENT
Start: 2019-01-11 | End: 2019-01-11 | Stop reason: SDUPTHER

## 2019-01-11 RX ORDER — ACETAMINOPHEN 325 MG/1
650 TABLET ORAL EVERY 4 HOURS PRN
Status: DISCONTINUED | OUTPATIENT
Start: 2019-01-11 | End: 2019-01-11 | Stop reason: HOSPADM

## 2019-01-11 RX ORDER — SODIUM CHLORIDE 0.9 % (FLUSH) 0.9 %
10 SYRINGE (ML) INJECTION PRN
Status: DISCONTINUED | OUTPATIENT
Start: 2019-01-11 | End: 2019-01-11 | Stop reason: SDUPTHER

## 2019-01-11 RX ORDER — DILTIAZEM HYDROCHLORIDE 240 MG/1
240 CAPSULE, COATED, EXTENDED RELEASE ORAL DAILY
Status: DISCONTINUED | OUTPATIENT
Start: 2019-01-12 | End: 2019-01-11 | Stop reason: HOSPADM

## 2019-01-11 RX ORDER — DILTIAZEM HYDROCHLORIDE 240 MG/1
240 CAPSULE, COATED, EXTENDED RELEASE ORAL DAILY
Qty: 30 CAPSULE | Refills: 0 | Status: ON HOLD | OUTPATIENT
Start: 2019-01-11 | End: 2019-02-14 | Stop reason: HOSPADM

## 2019-01-11 RX ORDER — DILTIAZEM HYDROCHLORIDE 240 MG/1
240 CAPSULE, COATED, EXTENDED RELEASE ORAL DAILY
Qty: 30 CAPSULE | Refills: 0 | Status: SHIPPED | OUTPATIENT
Start: 2019-01-11 | End: 2019-01-11

## 2019-01-11 RX ADMIN — Medication 10 ML: at 08:53

## 2019-01-11 RX ADMIN — DILTIAZEM HYDROCHLORIDE 5 MG/HR: 5 INJECTION INTRAVENOUS at 08:47

## 2019-01-12 ENCOUNTER — CARE COORDINATION (OUTPATIENT)
Dept: CASE MANAGEMENT | Age: 74
End: 2019-01-12

## 2019-01-14 ENCOUNTER — TELEPHONE (OUTPATIENT)
Dept: CARDIOLOGY CLINIC | Age: 74
End: 2019-01-14

## 2019-01-16 ENCOUNTER — OFFICE VISIT (OUTPATIENT)
Dept: CARDIOLOGY CLINIC | Age: 74
End: 2019-01-16
Payer: MEDICARE

## 2019-01-16 VITALS
HEIGHT: 70 IN | OXYGEN SATURATION: 93 % | DIASTOLIC BLOOD PRESSURE: 62 MMHG | BODY MASS INDEX: 26.48 KG/M2 | WEIGHT: 185 LBS | SYSTOLIC BLOOD PRESSURE: 118 MMHG | HEART RATE: 61 BPM

## 2019-01-16 DIAGNOSIS — I25.118 CORONARY ARTERY DISEASE OF NATIVE ARTERY OF NATIVE HEART WITH STABLE ANGINA PECTORIS (HCC): Primary | ICD-10-CM

## 2019-01-16 DIAGNOSIS — I44.2 ATRIOVENTRICULAR BLOCK, COMPLETE (HCC): ICD-10-CM

## 2019-01-16 DIAGNOSIS — I10 ESSENTIAL HYPERTENSION: ICD-10-CM

## 2019-01-16 DIAGNOSIS — Z79.01 CHRONIC ANTICOAGULATION: ICD-10-CM

## 2019-01-16 DIAGNOSIS — I48.0 PAROXYSMAL ATRIAL FIBRILLATION (HCC): ICD-10-CM

## 2019-01-16 DIAGNOSIS — Z95.2 S/P AVR (AORTIC VALVE REPLACEMENT): ICD-10-CM

## 2019-01-16 DIAGNOSIS — E78.2 MIXED HYPERLIPIDEMIA: ICD-10-CM

## 2019-01-16 DIAGNOSIS — Z98.890 S/P ABLATION OF ATRIAL FLUTTER: ICD-10-CM

## 2019-01-16 DIAGNOSIS — Z95.0 CARDIAC PACEMAKER IN SITU: ICD-10-CM

## 2019-01-16 DIAGNOSIS — Q21.0 VSD (VENTRICULAR SEPTAL DEFECT): ICD-10-CM

## 2019-01-16 DIAGNOSIS — Z86.79 S/P ABLATION OF ATRIAL FLUTTER: ICD-10-CM

## 2019-01-16 LAB — INR BLD: 2

## 2019-01-16 PROCEDURE — 3017F COLORECTAL CA SCREEN DOC REV: CPT | Performed by: NURSE PRACTITIONER

## 2019-01-16 PROCEDURE — 99213 OFFICE O/P EST LOW 20 MIN: CPT | Performed by: NURSE PRACTITIONER

## 2019-01-16 PROCEDURE — 1036F TOBACCO NON-USER: CPT | Performed by: NURSE PRACTITIONER

## 2019-01-16 PROCEDURE — G8417 CALC BMI ABV UP PARAM F/U: HCPCS | Performed by: NURSE PRACTITIONER

## 2019-01-16 PROCEDURE — G8484 FLU IMMUNIZE NO ADMIN: HCPCS | Performed by: NURSE PRACTITIONER

## 2019-01-16 PROCEDURE — G8598 ASA/ANTIPLAT THER USED: HCPCS | Performed by: NURSE PRACTITIONER

## 2019-01-16 PROCEDURE — 1111F DSCHRG MED/CURRENT MED MERGE: CPT | Performed by: NURSE PRACTITIONER

## 2019-01-16 PROCEDURE — 1101F PT FALLS ASSESS-DOCD LE1/YR: CPT | Performed by: NURSE PRACTITIONER

## 2019-01-16 PROCEDURE — 1123F ACP DISCUSS/DSCN MKR DOCD: CPT | Performed by: NURSE PRACTITIONER

## 2019-01-16 PROCEDURE — G8427 DOCREV CUR MEDS BY ELIG CLIN: HCPCS | Performed by: NURSE PRACTITIONER

## 2019-01-16 PROCEDURE — 4040F PNEUMOC VAC/ADMIN/RCVD: CPT | Performed by: NURSE PRACTITIONER

## 2019-01-16 RX ORDER — AMIODARONE HYDROCHLORIDE 200 MG/1
200 TABLET ORAL EVERY EVENING
Qty: 90 TABLET | Refills: 3 | Status: SHIPPED
Start: 2019-01-16 | End: 2019-03-19

## 2019-01-17 ENCOUNTER — ANTI-COAG VISIT (OUTPATIENT)
Dept: CARDIOLOGY CLINIC | Age: 74
End: 2019-01-17

## 2019-01-17 ENCOUNTER — TELEPHONE (OUTPATIENT)
Dept: CARDIOLOGY CLINIC | Age: 74
End: 2019-01-17

## 2019-01-18 ENCOUNTER — TELEPHONE (OUTPATIENT)
Dept: CARDIOLOGY CLINIC | Age: 74
End: 2019-01-18

## 2019-01-25 ENCOUNTER — TELEPHONE (OUTPATIENT)
Dept: CARDIOLOGY CLINIC | Age: 74
End: 2019-01-25

## 2019-01-25 DIAGNOSIS — I48.0 PAROXYSMAL ATRIAL FIBRILLATION (HCC): Primary | Chronic | ICD-10-CM

## 2019-01-28 ENCOUNTER — HOSPITAL ENCOUNTER (OUTPATIENT)
Dept: CT IMAGING | Age: 74
Discharge: HOME | End: 2019-01-28
Payer: MEDICARE

## 2019-01-28 DIAGNOSIS — I48.0 PAROXYSMAL ATRIAL FIBRILLATION (HCC): Chronic | ICD-10-CM

## 2019-01-28 PROCEDURE — 75572 CT HRT W/3D IMAGE: CPT

## 2019-01-28 PROCEDURE — 6360000004 HC RX CONTRAST MEDICATION: Performed by: INTERNAL MEDICINE

## 2019-01-28 RX ADMIN — IOPAMIDOL 75 ML: 755 INJECTION, SOLUTION INTRAVENOUS at 09:25

## 2019-01-30 LAB — INR BLD: 2.3

## 2019-02-01 ENCOUNTER — ANTI-COAG VISIT (OUTPATIENT)
Dept: CARDIOLOGY CLINIC | Age: 74
End: 2019-02-01

## 2019-02-12 ENCOUNTER — ANESTHESIA EVENT (OUTPATIENT)
Dept: CARDIAC CATH/INVASIVE PROCEDURES | Age: 74
End: 2019-02-12
Payer: MEDICARE

## 2019-02-12 LAB — INR BLD: 2.6

## 2019-02-13 ENCOUNTER — HOSPITAL ENCOUNTER (OUTPATIENT)
Dept: CARDIAC CATH/INVASIVE PROCEDURES | Age: 74
Discharge: HOME | End: 2019-02-14
Attending: INTERNAL MEDICINE | Admitting: INTERNAL MEDICINE
Payer: MEDICARE

## 2019-02-13 ENCOUNTER — ANESTHESIA (OUTPATIENT)
Dept: CARDIAC CATH/INVASIVE PROCEDURES | Age: 74
End: 2019-02-13
Payer: MEDICARE

## 2019-02-13 ENCOUNTER — ANTI-COAG VISIT (OUTPATIENT)
Dept: CARDIOLOGY CLINIC | Age: 74
End: 2019-02-13

## 2019-02-13 VITALS
DIASTOLIC BLOOD PRESSURE: 65 MMHG | TEMPERATURE: 96.8 F | SYSTOLIC BLOOD PRESSURE: 101 MMHG | RESPIRATION RATE: 10 BRPM | OXYGEN SATURATION: 100 %

## 2019-02-13 PROBLEM — I48.0 PAF (PAROXYSMAL ATRIAL FIBRILLATION) (HCC): Status: ACTIVE | Noted: 2019-02-13

## 2019-02-13 LAB
A/G RATIO: 1.6 (ref 1.1–2.2)
ABO/RH: NORMAL
ALBUMIN SERPL-MCNC: 4.5 G/DL (ref 3.4–5)
ALP BLD-CCNC: 73 U/L (ref 40–129)
ALT SERPL-CCNC: 31 U/L (ref 10–40)
ANION GAP SERPL CALCULATED.3IONS-SCNC: 13 MMOL/L (ref 3–16)
ANTIBODY SCREEN: NORMAL
AST SERPL-CCNC: 32 U/L (ref 15–37)
BASOPHILS ABSOLUTE: 0.1 K/UL (ref 0–0.2)
BASOPHILS RELATIVE PERCENT: 1.1 %
BILIRUB SERPL-MCNC: 0.8 MG/DL (ref 0–1)
BUN BLDV-MCNC: 35 MG/DL (ref 7–20)
CALCIUM SERPL-MCNC: 9.4 MG/DL (ref 8.3–10.6)
CHLORIDE BLD-SCNC: 102 MMOL/L (ref 99–110)
CO2: 26 MMOL/L (ref 21–32)
CREAT SERPL-MCNC: 1.1 MG/DL (ref 0.8–1.3)
EOSINOPHILS ABSOLUTE: 0.1 K/UL (ref 0–0.6)
EOSINOPHILS RELATIVE PERCENT: 2.5 %
GFR AFRICAN AMERICAN: >60
GFR NON-AFRICAN AMERICAN: >60
GLOBULIN: 2.9 G/DL
GLUCOSE BLD-MCNC: 95 MG/DL (ref 70–99)
HCT VFR BLD CALC: 43.7 % (ref 40.5–52.5)
HEMOGLOBIN: 14.7 G/DL (ref 13.5–17.5)
INR BLD: 2.2 (ref 0.86–1.14)
LV EF: 55 %
LVEF MODALITY: NORMAL
LYMPHOCYTES ABSOLUTE: 1 K/UL (ref 1–5.1)
LYMPHOCYTES RELATIVE PERCENT: 22 %
MAGNESIUM: 2.6 MG/DL (ref 1.8–2.4)
MCH RBC QN AUTO: 32.8 PG (ref 26–34)
MCHC RBC AUTO-ENTMCNC: 33.6 G/DL (ref 31–36)
MCV RBC AUTO: 97.8 FL (ref 80–100)
MONOCYTES ABSOLUTE: 0.5 K/UL (ref 0–1.3)
MONOCYTES RELATIVE PERCENT: 11.9 %
NEUTROPHILS ABSOLUTE: 2.8 K/UL (ref 1.7–7.7)
NEUTROPHILS RELATIVE PERCENT: 62.5 %
PDW BLD-RTO: 14 % (ref 12.4–15.4)
PLATELET # BLD: 161 K/UL (ref 135–450)
PMV BLD AUTO: 8.1 FL (ref 5–10.5)
POTASSIUM SERPL-SCNC: 4.1 MMOL/L (ref 3.5–5.1)
PROTHROMBIN TIME: 25.1 SEC (ref 9.8–13)
RBC # BLD: 4.46 M/UL (ref 4.2–5.9)
SODIUM BLD-SCNC: 141 MMOL/L (ref 136–145)
TOTAL PROTEIN: 7.4 G/DL (ref 6.4–8.2)
WBC # BLD: 4.5 K/UL (ref 4–11)

## 2019-02-13 PROCEDURE — 93005 ELECTROCARDIOGRAM TRACING: CPT | Performed by: INTERNAL MEDICINE

## 2019-02-13 PROCEDURE — 93662 INTRACARDIAC ECG (ICE): CPT | Performed by: INTERNAL MEDICINE

## 2019-02-13 PROCEDURE — C1894 INTRO/SHEATH, NON-LASER: HCPCS

## 2019-02-13 PROCEDURE — 93623 PRGRMD STIMJ&PACG IV RX NFS: CPT | Performed by: INTERNAL MEDICINE

## 2019-02-13 PROCEDURE — 3700000000 HC ANESTHESIA ATTENDED CARE

## 2019-02-13 PROCEDURE — 93312 ECHO TRANSESOPHAGEAL: CPT | Performed by: INTERNAL MEDICINE

## 2019-02-13 PROCEDURE — 93325 DOPPLER ECHO COLOR FLOW MAPG: CPT | Performed by: INTERNAL MEDICINE

## 2019-02-13 PROCEDURE — 2580000003 HC RX 258: Performed by: NURSE ANESTHETIST, CERTIFIED REGISTERED

## 2019-02-13 PROCEDURE — 36415 COLL VENOUS BLD VENIPUNCTURE: CPT

## 2019-02-13 PROCEDURE — 2720000010 HC SURG SUPPLY STERILE

## 2019-02-13 PROCEDURE — C1759 CATH, INTRA ECHOCARDIOGRAPHY: HCPCS

## 2019-02-13 PROCEDURE — 93613 INTRACARDIAC EPHYS 3D MAPG: CPT | Performed by: INTERNAL MEDICINE

## 2019-02-13 PROCEDURE — 86850 RBC ANTIBODY SCREEN: CPT

## 2019-02-13 PROCEDURE — 6370000000 HC RX 637 (ALT 250 FOR IP): Performed by: INTERNAL MEDICINE

## 2019-02-13 PROCEDURE — 93656 COMPRE EP EVAL ABLTJ ATR FIB: CPT | Performed by: INTERNAL MEDICINE

## 2019-02-13 PROCEDURE — 83735 ASSAY OF MAGNESIUM: CPT

## 2019-02-13 PROCEDURE — 93655 ICAR CATH ABLTJ DSCRT ARRHYT: CPT | Performed by: INTERNAL MEDICINE

## 2019-02-13 PROCEDURE — 6360000002 HC RX W HCPCS

## 2019-02-13 PROCEDURE — 2500000003 HC RX 250 WO HCPCS: Performed by: NURSE ANESTHETIST, CERTIFIED REGISTERED

## 2019-02-13 PROCEDURE — 93320 DOPPLER ECHO COMPLETE: CPT | Performed by: INTERNAL MEDICINE

## 2019-02-13 PROCEDURE — 3700000001 HC ADD 15 MINUTES (ANESTHESIA)

## 2019-02-13 PROCEDURE — 2580000003 HC RX 258

## 2019-02-13 PROCEDURE — 86901 BLOOD TYPING SEROLOGIC RH(D): CPT

## 2019-02-13 PROCEDURE — 85610 PROTHROMBIN TIME: CPT

## 2019-02-13 PROCEDURE — 80053 COMPREHEN METABOLIC PANEL: CPT

## 2019-02-13 PROCEDURE — 85347 COAGULATION TIME ACTIVATED: CPT

## 2019-02-13 PROCEDURE — 2580000003 HC RX 258: Performed by: INTERNAL MEDICINE

## 2019-02-13 PROCEDURE — 85025 COMPLETE CBC W/AUTO DIFF WBC: CPT

## 2019-02-13 PROCEDURE — C1769 GUIDE WIRE: HCPCS

## 2019-02-13 PROCEDURE — 2500000003 HC RX 250 WO HCPCS

## 2019-02-13 PROCEDURE — 6360000002 HC RX W HCPCS: Performed by: NURSE ANESTHETIST, CERTIFIED REGISTERED

## 2019-02-13 PROCEDURE — 86900 BLOOD TYPING SEROLOGIC ABO: CPT

## 2019-02-13 PROCEDURE — C1732 CATH, EP, DIAG/ABL, 3D/VECT: HCPCS

## 2019-02-13 RX ORDER — WARFARIN SODIUM 2.5 MG/1
1.25 TABLET ORAL EVERY OTHER DAY
Status: DISCONTINUED | OUTPATIENT
Start: 2019-02-14 | End: 2019-02-14 | Stop reason: HOSPADM

## 2019-02-13 RX ORDER — SODIUM CHLORIDE 9 MG/ML
INJECTION, SOLUTION INTRAVENOUS CONTINUOUS PRN
Status: DISCONTINUED | OUTPATIENT
Start: 2019-02-13 | End: 2019-02-13 | Stop reason: SDUPTHER

## 2019-02-13 RX ORDER — SODIUM CHLORIDE 0.9 % (FLUSH) 0.9 %
10 SYRINGE (ML) INJECTION PRN
Status: DISCONTINUED | OUTPATIENT
Start: 2019-02-13 | End: 2019-02-14 | Stop reason: HOSPADM

## 2019-02-13 RX ORDER — ACETAMINOPHEN 325 MG/1
650 TABLET ORAL EVERY 4 HOURS PRN
Status: DISCONTINUED | OUTPATIENT
Start: 2019-02-13 | End: 2019-02-14 | Stop reason: HOSPADM

## 2019-02-13 RX ORDER — AMIODARONE HYDROCHLORIDE 200 MG/1
200 TABLET ORAL EVERY EVENING
Status: DISCONTINUED | OUTPATIENT
Start: 2019-02-13 | End: 2019-02-14 | Stop reason: HOSPADM

## 2019-02-13 RX ORDER — LEVOTHYROXINE SODIUM 0.07 MG/1
75 TABLET ORAL DAILY
Status: DISCONTINUED | OUTPATIENT
Start: 2019-02-13 | End: 2019-02-14 | Stop reason: HOSPADM

## 2019-02-13 RX ORDER — TAMSULOSIN HYDROCHLORIDE 0.4 MG/1
0.4 CAPSULE ORAL DAILY
Status: DISCONTINUED | OUTPATIENT
Start: 2019-02-13 | End: 2019-02-14 | Stop reason: HOSPADM

## 2019-02-13 RX ORDER — METOPROLOL SUCCINATE 50 MG/1
50 TABLET, EXTENDED RELEASE ORAL NIGHTLY
Status: DISCONTINUED | OUTPATIENT
Start: 2019-02-13 | End: 2019-02-14 | Stop reason: HOSPADM

## 2019-02-13 RX ORDER — SODIUM CHLORIDE 0.9 % (FLUSH) 0.9 %
10 SYRINGE (ML) INJECTION EVERY 12 HOURS SCHEDULED
Status: DISCONTINUED | OUTPATIENT
Start: 2019-02-13 | End: 2019-02-14 | Stop reason: HOSPADM

## 2019-02-13 RX ORDER — FENTANYL CITRATE 50 UG/ML
INJECTION, SOLUTION INTRAMUSCULAR; INTRAVENOUS PRN
Status: DISCONTINUED | OUTPATIENT
Start: 2019-02-13 | End: 2019-02-13 | Stop reason: SDUPTHER

## 2019-02-13 RX ORDER — FUROSEMIDE 20 MG/1
20 TABLET ORAL DAILY
Status: DISCONTINUED | OUTPATIENT
Start: 2019-02-13 | End: 2019-02-14 | Stop reason: HOSPADM

## 2019-02-13 RX ORDER — CEFAZOLIN SODIUM 1 G/3ML
INJECTION, POWDER, FOR SOLUTION INTRAMUSCULAR; INTRAVENOUS PRN
Status: DISCONTINUED | OUTPATIENT
Start: 2019-02-13 | End: 2019-02-13 | Stop reason: SDUPTHER

## 2019-02-13 RX ORDER — HEPARIN SODIUM 10000 [USP'U]/100ML
INJECTION, SOLUTION INTRAVENOUS CONTINUOUS PRN
Status: DISCONTINUED | OUTPATIENT
Start: 2019-02-13 | End: 2019-02-13 | Stop reason: SDUPTHER

## 2019-02-13 RX ORDER — FINASTERIDE 5 MG/1
5 TABLET, FILM COATED ORAL NIGHTLY
Status: DISCONTINUED | OUTPATIENT
Start: 2019-02-13 | End: 2019-02-14 | Stop reason: HOSPADM

## 2019-02-13 RX ORDER — HEPARIN SODIUM 1000 [USP'U]/ML
INJECTION, SOLUTION INTRAVENOUS; SUBCUTANEOUS PRN
Status: DISCONTINUED | OUTPATIENT
Start: 2019-02-13 | End: 2019-02-13 | Stop reason: SDUPTHER

## 2019-02-13 RX ORDER — ASPIRIN 81 MG/1
81 TABLET ORAL DAILY
Status: DISCONTINUED | OUTPATIENT
Start: 2019-02-13 | End: 2019-02-14 | Stop reason: HOSPADM

## 2019-02-13 RX ORDER — LIDOCAINE HYDROCHLORIDE 20 MG/ML
INJECTION, SOLUTION INFILTRATION; PERINEURAL PRN
Status: DISCONTINUED | OUTPATIENT
Start: 2019-02-13 | End: 2019-02-13 | Stop reason: SDUPTHER

## 2019-02-13 RX ORDER — SUCCINYLCHOLINE CHLORIDE 20 MG/ML
INJECTION INTRAMUSCULAR; INTRAVENOUS PRN
Status: DISCONTINUED | OUTPATIENT
Start: 2019-02-13 | End: 2019-02-13 | Stop reason: SDUPTHER

## 2019-02-13 RX ORDER — PROPOFOL 10 MG/ML
INJECTION, EMULSION INTRAVENOUS PRN
Status: DISCONTINUED | OUTPATIENT
Start: 2019-02-13 | End: 2019-02-13 | Stop reason: SDUPTHER

## 2019-02-13 RX ORDER — WARFARIN SODIUM 2.5 MG/1
2.5 TABLET ORAL EVERY OTHER DAY
Status: DISCONTINUED | OUTPATIENT
Start: 2019-02-13 | End: 2019-02-14 | Stop reason: HOSPADM

## 2019-02-13 RX ORDER — PANTOPRAZOLE SODIUM 40 MG/1
40 TABLET, DELAYED RELEASE ORAL
Status: DISCONTINUED | OUTPATIENT
Start: 2019-02-14 | End: 2019-02-14 | Stop reason: HOSPADM

## 2019-02-13 RX ORDER — ONDANSETRON 2 MG/ML
INJECTION INTRAMUSCULAR; INTRAVENOUS PRN
Status: DISCONTINUED | OUTPATIENT
Start: 2019-02-13 | End: 2019-02-13 | Stop reason: SDUPTHER

## 2019-02-13 RX ORDER — DEXAMETHASONE SODIUM PHOSPHATE 4 MG/ML
INJECTION, SOLUTION INTRA-ARTICULAR; INTRALESIONAL; INTRAMUSCULAR; INTRAVENOUS; SOFT TISSUE PRN
Status: DISCONTINUED | OUTPATIENT
Start: 2019-02-13 | End: 2019-02-13 | Stop reason: SDUPTHER

## 2019-02-13 RX ORDER — METOPROLOL SUCCINATE 50 MG/1
50 TABLET, EXTENDED RELEASE ORAL DAILY
Status: DISCONTINUED | OUTPATIENT
Start: 2019-02-13 | End: 2019-02-13

## 2019-02-13 RX ORDER — DILTIAZEM HYDROCHLORIDE 240 MG/1
240 CAPSULE, COATED, EXTENDED RELEASE ORAL DAILY
Status: DISCONTINUED | OUTPATIENT
Start: 2019-02-13 | End: 2019-02-14 | Stop reason: HOSPADM

## 2019-02-13 RX ADMIN — SUCCINYLCHOLINE CHLORIDE 140 MG: 20 INJECTION, SOLUTION INTRAMUSCULAR; INTRAVENOUS at 08:04

## 2019-02-13 RX ADMIN — HEPARIN SODIUM 4000 UNITS: 1000 INJECTION INTRAVENOUS; SUBCUTANEOUS at 08:51

## 2019-02-13 RX ADMIN — DEXAMETHASONE SODIUM PHOSPHATE 8 MG: 4 INJECTION, SOLUTION INTRA-ARTICULAR; INTRALESIONAL; INTRAMUSCULAR; INTRAVENOUS; SOFT TISSUE at 08:18

## 2019-02-13 RX ADMIN — METOPROLOL SUCCINATE 50 MG: 50 TABLET, FILM COATED, EXTENDED RELEASE ORAL at 20:52

## 2019-02-13 RX ADMIN — Medication 10 ML: at 20:52

## 2019-02-13 RX ADMIN — PROPOFOL 200 MG: 10 INJECTION, EMULSION INTRAVENOUS at 08:04

## 2019-02-13 RX ADMIN — PHENYLEPHRINE HYDROCHLORIDE 100 MCG/MIN: 10 INJECTION, SOLUTION INTRAMUSCULAR; INTRAVENOUS; SUBCUTANEOUS at 08:06

## 2019-02-13 RX ADMIN — SODIUM CHLORIDE: 9 INJECTION, SOLUTION INTRAVENOUS at 08:37

## 2019-02-13 RX ADMIN — ACETAMINOPHEN 650 MG: 325 TABLET, FILM COATED ORAL at 15:15

## 2019-02-13 RX ADMIN — HEPARIN SODIUM 4000 UNITS: 1000 INJECTION INTRAVENOUS; SUBCUTANEOUS at 09:10

## 2019-02-13 RX ADMIN — PHENYLEPHRINE HYDROCHLORIDE 100 MCG: 10 INJECTION INTRAVENOUS at 10:12

## 2019-02-13 RX ADMIN — FENTANYL CITRATE 50 MCG: 50 INJECTION, SOLUTION INTRAMUSCULAR; INTRAVENOUS at 09:49

## 2019-02-13 RX ADMIN — PHENYLEPHRINE HYDROCHLORIDE 100 MCG: 10 INJECTION INTRAVENOUS at 10:20

## 2019-02-13 RX ADMIN — FINASTERIDE 5 MG: 5 TABLET, FILM COATED ORAL at 20:52

## 2019-02-13 RX ADMIN — PHENYLEPHRINE HYDROCHLORIDE 100 MCG: 10 INJECTION INTRAVENOUS at 10:26

## 2019-02-13 RX ADMIN — HEPARIN SODIUM 4000 UNITS: 1000 INJECTION INTRAVENOUS; SUBCUTANEOUS at 08:54

## 2019-02-13 RX ADMIN — ISOPROTERENOL HYDROCHLORIDE 5 MCG/MIN: 0.2 INJECTION, SOLUTION INTRAMUSCULAR; INTRAVENOUS at 10:04

## 2019-02-13 RX ADMIN — AMIODARONE HYDROCHLORIDE 200 MG: 200 TABLET ORAL at 18:33

## 2019-02-13 RX ADMIN — CEFAZOLIN 2000 MG: 1 INJECTION, POWDER, FOR SOLUTION INTRAVENOUS at 08:17

## 2019-02-13 RX ADMIN — PHENYLEPHRINE HYDROCHLORIDE 100 MCG: 10 INJECTION INTRAVENOUS at 10:17

## 2019-02-13 RX ADMIN — FENTANYL CITRATE 50 MCG: 50 INJECTION, SOLUTION INTRAMUSCULAR; INTRAVENOUS at 08:04

## 2019-02-13 RX ADMIN — PHENYLEPHRINE HYDROCHLORIDE 200 MCG: 10 INJECTION INTRAVENOUS at 10:34

## 2019-02-13 RX ADMIN — ONDANSETRON 4 MG: 2 INJECTION INTRAMUSCULAR; INTRAVENOUS at 08:18

## 2019-02-13 RX ADMIN — PHENYLEPHRINE HYDROCHLORIDE 200 MCG: 10 INJECTION INTRAVENOUS at 10:23

## 2019-02-13 RX ADMIN — WARFARIN SODIUM 2.5 MG: 2.5 TABLET ORAL at 18:32

## 2019-02-13 RX ADMIN — LIDOCAINE HYDROCHLORIDE 100 MG: 20 INJECTION, SOLUTION INFILTRATION; PERINEURAL at 08:04

## 2019-02-13 RX ADMIN — HEPARIN SODIUM 1000 UNITS/HR: 10000 INJECTION, SOLUTION INTRAVENOUS at 09:11

## 2019-02-13 RX ADMIN — SODIUM CHLORIDE: 9 INJECTION, SOLUTION INTRAVENOUS at 07:52

## 2019-02-13 ASSESSMENT — PULMONARY FUNCTION TESTS
PIF_VALUE: 20
PIF_VALUE: 17
PIF_VALUE: 16
PIF_VALUE: 17
PIF_VALUE: 18
PIF_VALUE: 16
PIF_VALUE: 20
PIF_VALUE: 19
PIF_VALUE: 15
PIF_VALUE: 17
PIF_VALUE: 16
PIF_VALUE: 16
PIF_VALUE: 18
PIF_VALUE: 18
PIF_VALUE: 19
PIF_VALUE: 18
PIF_VALUE: 19
PIF_VALUE: 19
PIF_VALUE: 16
PIF_VALUE: 18
PIF_VALUE: 18
PIF_VALUE: 2
PIF_VALUE: 19
PIF_VALUE: 19
PIF_VALUE: 17
PIF_VALUE: 17
PIF_VALUE: 19
PIF_VALUE: 28
PIF_VALUE: 19
PIF_VALUE: 17
PIF_VALUE: 17
PIF_VALUE: 29
PIF_VALUE: 16
PIF_VALUE: 17
PIF_VALUE: 20
PIF_VALUE: 17
PIF_VALUE: 17
PIF_VALUE: 4
PIF_VALUE: 20
PIF_VALUE: 19
PIF_VALUE: 17
PIF_VALUE: 18
PIF_VALUE: 15
PIF_VALUE: 19
PIF_VALUE: 17
PIF_VALUE: 19
PIF_VALUE: 2
PIF_VALUE: 17
PIF_VALUE: 19
PIF_VALUE: 19
PIF_VALUE: 18
PIF_VALUE: 17
PIF_VALUE: 17
PIF_VALUE: 19
PIF_VALUE: 18
PIF_VALUE: 1
PIF_VALUE: 19
PIF_VALUE: 19
PIF_VALUE: 3
PIF_VALUE: 19
PIF_VALUE: 1
PIF_VALUE: 18
PIF_VALUE: 18
PIF_VALUE: 19
PIF_VALUE: 19
PIF_VALUE: 20
PIF_VALUE: 18
PIF_VALUE: 17
PIF_VALUE: 19
PIF_VALUE: 17
PIF_VALUE: 18
PIF_VALUE: 18
PIF_VALUE: 17
PIF_VALUE: 17
PIF_VALUE: 19
PIF_VALUE: 15
PIF_VALUE: 18
PIF_VALUE: 2
PIF_VALUE: 15
PIF_VALUE: 17
PIF_VALUE: 18
PIF_VALUE: 17
PIF_VALUE: 17
PIF_VALUE: 4
PIF_VALUE: 19
PIF_VALUE: 18
PIF_VALUE: 19
PIF_VALUE: 18
PIF_VALUE: 0
PIF_VALUE: 17
PIF_VALUE: 15
PIF_VALUE: 19
PIF_VALUE: 31
PIF_VALUE: 1
PIF_VALUE: 18
PIF_VALUE: 15
PIF_VALUE: 19
PIF_VALUE: 18
PIF_VALUE: 17
PIF_VALUE: 19
PIF_VALUE: 17
PIF_VALUE: 19
PIF_VALUE: 17
PIF_VALUE: 1
PIF_VALUE: 17
PIF_VALUE: 20
PIF_VALUE: 18
PIF_VALUE: 17
PIF_VALUE: 19
PIF_VALUE: 18
PIF_VALUE: 19
PIF_VALUE: 18
PIF_VALUE: 18
PIF_VALUE: 17
PIF_VALUE: 17
PIF_VALUE: 19
PIF_VALUE: 19
PIF_VALUE: 1
PIF_VALUE: 18
PIF_VALUE: 17
PIF_VALUE: 0
PIF_VALUE: 20
PIF_VALUE: 18
PIF_VALUE: 16
PIF_VALUE: 17
PIF_VALUE: 19
PIF_VALUE: 2
PIF_VALUE: 16
PIF_VALUE: 19
PIF_VALUE: 17
PIF_VALUE: 1
PIF_VALUE: 17
PIF_VALUE: 19
PIF_VALUE: 19
PIF_VALUE: 1
PIF_VALUE: 17
PIF_VALUE: 18
PIF_VALUE: 20
PIF_VALUE: 2
PIF_VALUE: 1
PIF_VALUE: 19
PIF_VALUE: 19
PIF_VALUE: 27
PIF_VALUE: 18
PIF_VALUE: 17
PIF_VALUE: 19
PIF_VALUE: 19
PIF_VALUE: 1
PIF_VALUE: 19
PIF_VALUE: 16
PIF_VALUE: 19
PIF_VALUE: 19
PIF_VALUE: 17
PIF_VALUE: 18
PIF_VALUE: 19
PIF_VALUE: 2
PIF_VALUE: 18
PIF_VALUE: 16
PIF_VALUE: 3
PIF_VALUE: 19
PIF_VALUE: 19
PIF_VALUE: 18
PIF_VALUE: 18
PIF_VALUE: 17
PIF_VALUE: 18
PIF_VALUE: 16
PIF_VALUE: 18
PIF_VALUE: 19
PIF_VALUE: 20
PIF_VALUE: 19
PIF_VALUE: 16
PIF_VALUE: 17
PIF_VALUE: 18

## 2019-02-13 ASSESSMENT — PAIN SCALES - GENERAL
PAINLEVEL_OUTOF10: 0
PAINLEVEL_OUTOF10: 7
PAINLEVEL_OUTOF10: 0
PAINLEVEL_OUTOF10: 0

## 2019-02-13 ASSESSMENT — PAIN DESCRIPTION - LOCATION: LOCATION: GENERALIZED

## 2019-02-13 ASSESSMENT — PAIN DESCRIPTION - PAIN TYPE: TYPE: CHRONIC PAIN

## 2019-02-13 ASSESSMENT — LIFESTYLE VARIABLES: SMOKING_STATUS: 1

## 2019-02-14 VITALS
DIASTOLIC BLOOD PRESSURE: 74 MMHG | WEIGHT: 170.64 LBS | RESPIRATION RATE: 16 BRPM | OXYGEN SATURATION: 97 % | SYSTOLIC BLOOD PRESSURE: 123 MMHG | BODY MASS INDEX: 24.43 KG/M2 | TEMPERATURE: 97.6 F | HEART RATE: 70 BPM | HEIGHT: 70 IN

## 2019-02-14 LAB
ANION GAP SERPL CALCULATED.3IONS-SCNC: 13 MMOL/L (ref 3–16)
BUN BLDV-MCNC: 24 MG/DL (ref 7–20)
CALCIUM SERPL-MCNC: 8.8 MG/DL (ref 8.3–10.6)
CHLORIDE BLD-SCNC: 108 MMOL/L (ref 99–110)
CO2: 21 MMOL/L (ref 21–32)
CREAT SERPL-MCNC: 1 MG/DL (ref 0.8–1.3)
EKG ATRIAL RATE: 60 BPM
EKG DIAGNOSIS: NORMAL
EKG P AXIS: 56 DEGREES
EKG P-R INTERVAL: 150 MS
EKG Q-T INTERVAL: 474 MS
EKG QRS DURATION: 102 MS
EKG QTC CALCULATION (BAZETT): 474 MS
EKG R AXIS: -43 DEGREES
EKG T AXIS: 39 DEGREES
EKG VENTRICULAR RATE: 60 BPM
GFR AFRICAN AMERICAN: >60
GFR NON-AFRICAN AMERICAN: >60
GLUCOSE BLD-MCNC: 143 MG/DL (ref 70–99)
HCT VFR BLD CALC: 39.3 % (ref 40.5–52.5)
HEMOGLOBIN: 13.2 G/DL (ref 13.5–17.5)
MCH RBC QN AUTO: 32.9 PG (ref 26–34)
MCHC RBC AUTO-ENTMCNC: 33.5 G/DL (ref 31–36)
MCV RBC AUTO: 98.1 FL (ref 80–100)
PDW BLD-RTO: 14 % (ref 12.4–15.4)
PLATELET # BLD: 146 K/UL (ref 135–450)
PMV BLD AUTO: 8.2 FL (ref 5–10.5)
POC ACT LR: 186 SEC
POC ACT LR: 363 SEC
POC ACT LR: >400 SEC
POC ACT LR: >400 SEC
POTASSIUM SERPL-SCNC: 4.5 MMOL/L (ref 3.5–5.1)
RBC # BLD: 4 M/UL (ref 4.2–5.9)
SODIUM BLD-SCNC: 142 MMOL/L (ref 136–145)
WBC # BLD: 9.3 K/UL (ref 4–11)

## 2019-02-14 PROCEDURE — 99217 PR OBSERVATION CARE DISCHARGE MANAGEMENT: CPT | Performed by: NURSE PRACTITIONER

## 2019-02-14 PROCEDURE — 6370000000 HC RX 637 (ALT 250 FOR IP): Performed by: INTERNAL MEDICINE

## 2019-02-14 PROCEDURE — 2580000003 HC RX 258

## 2019-02-14 PROCEDURE — 2500000003 HC RX 250 WO HCPCS

## 2019-02-14 PROCEDURE — 93010 ELECTROCARDIOGRAM REPORT: CPT | Performed by: INTERNAL MEDICINE

## 2019-02-14 PROCEDURE — 85027 COMPLETE CBC AUTOMATED: CPT

## 2019-02-14 PROCEDURE — 2580000003 HC RX 258: Performed by: INTERNAL MEDICINE

## 2019-02-14 PROCEDURE — 80048 BASIC METABOLIC PNL TOTAL CA: CPT

## 2019-02-14 RX ORDER — PANTOPRAZOLE SODIUM 40 MG/1
40 TABLET, DELAYED RELEASE ORAL
Qty: 30 TABLET | Refills: 3 | Status: SHIPPED | OUTPATIENT
Start: 2019-02-15 | End: 2019-06-06 | Stop reason: ALTCHOICE

## 2019-02-14 RX ORDER — DILTIAZEM HYDROCHLORIDE 180 MG/1
180 CAPSULE, COATED, EXTENDED RELEASE ORAL DAILY
Qty: 30 CAPSULE | Refills: 3 | Status: SHIPPED | OUTPATIENT
Start: 2019-02-14 | End: 2019-06-06 | Stop reason: SDUPTHER

## 2019-02-14 RX ORDER — METOPROLOL SUCCINATE 50 MG/1
50 TABLET, EXTENDED RELEASE ORAL NIGHTLY
Qty: 30 TABLET | Refills: 3 | Status: SHIPPED | OUTPATIENT
Start: 2019-02-14 | End: 2019-06-06 | Stop reason: SDUPTHER

## 2019-02-14 RX ADMIN — Medication 10 ML: at 08:15

## 2019-02-14 RX ADMIN — TAMSULOSIN HYDROCHLORIDE 0.4 MG: 0.4 CAPSULE ORAL at 08:14

## 2019-02-14 RX ADMIN — PANTOPRAZOLE SODIUM 40 MG: 40 TABLET, DELAYED RELEASE ORAL at 04:54

## 2019-02-14 RX ADMIN — ASPIRIN 81 MG: 81 TABLET, COATED ORAL at 08:14

## 2019-02-14 RX ADMIN — FUROSEMIDE 20 MG: 20 TABLET ORAL at 08:14

## 2019-02-14 RX ADMIN — DILTIAZEM HYDROCHLORIDE 240 MG: 240 CAPSULE, COATED, EXTENDED RELEASE ORAL at 08:14

## 2019-02-14 RX ADMIN — LEVOTHYROXINE SODIUM 75 MCG: 75 TABLET ORAL at 07:28

## 2019-02-14 ASSESSMENT — PAIN SCALES - GENERAL
PAINLEVEL_OUTOF10: 0

## 2019-02-27 LAB — INR BLD: 2

## 2019-03-01 ENCOUNTER — ANTI-COAG VISIT (OUTPATIENT)
Dept: CARDIOLOGY CLINIC | Age: 74
End: 2019-03-01

## 2019-03-14 LAB — INR BLD: 2.7

## 2019-03-15 ENCOUNTER — ANTI-COAG VISIT (OUTPATIENT)
Dept: CARDIOLOGY CLINIC | Age: 74
End: 2019-03-15

## 2019-03-19 ENCOUNTER — PROCEDURE VISIT (OUTPATIENT)
Dept: CARDIOLOGY CLINIC | Age: 74
End: 2019-03-19
Payer: MEDICARE

## 2019-03-19 ENCOUNTER — OFFICE VISIT (OUTPATIENT)
Dept: CARDIOLOGY CLINIC | Age: 74
End: 2019-03-19
Payer: MEDICARE

## 2019-03-19 VITALS
HEIGHT: 70 IN | RESPIRATION RATE: 16 BRPM | SYSTOLIC BLOOD PRESSURE: 102 MMHG | BODY MASS INDEX: 26.48 KG/M2 | HEART RATE: 69 BPM | DIASTOLIC BLOOD PRESSURE: 69 MMHG | WEIGHT: 185 LBS

## 2019-03-19 DIAGNOSIS — I10 ESSENTIAL HYPERTENSION: ICD-10-CM

## 2019-03-19 DIAGNOSIS — I48.0 PAROXYSMAL ATRIAL FIBRILLATION (HCC): Primary | ICD-10-CM

## 2019-03-19 DIAGNOSIS — I44.2 ATRIOVENTRICULAR BLOCK, COMPLETE (HCC): Chronic | ICD-10-CM

## 2019-03-19 DIAGNOSIS — Z95.0 CARDIAC PACEMAKER IN SITU: ICD-10-CM

## 2019-03-19 PROCEDURE — 4040F PNEUMOC VAC/ADMIN/RCVD: CPT | Performed by: INTERNAL MEDICINE

## 2019-03-19 PROCEDURE — G8598 ASA/ANTIPLAT THER USED: HCPCS | Performed by: INTERNAL MEDICINE

## 2019-03-19 PROCEDURE — 93000 ELECTROCARDIOGRAM COMPLETE: CPT | Performed by: INTERNAL MEDICINE

## 2019-03-19 PROCEDURE — G8417 CALC BMI ABV UP PARAM F/U: HCPCS | Performed by: INTERNAL MEDICINE

## 2019-03-19 PROCEDURE — 1101F PT FALLS ASSESS-DOCD LE1/YR: CPT | Performed by: INTERNAL MEDICINE

## 2019-03-19 PROCEDURE — 99214 OFFICE O/P EST MOD 30 MIN: CPT | Performed by: INTERNAL MEDICINE

## 2019-03-19 PROCEDURE — 93280 PM DEVICE PROGR EVAL DUAL: CPT | Performed by: INTERNAL MEDICINE

## 2019-03-19 PROCEDURE — G8484 FLU IMMUNIZE NO ADMIN: HCPCS | Performed by: INTERNAL MEDICINE

## 2019-03-19 PROCEDURE — 3017F COLORECTAL CA SCREEN DOC REV: CPT | Performed by: INTERNAL MEDICINE

## 2019-03-19 PROCEDURE — G8427 DOCREV CUR MEDS BY ELIG CLIN: HCPCS | Performed by: INTERNAL MEDICINE

## 2019-03-19 PROCEDURE — 1123F ACP DISCUSS/DSCN MKR DOCD: CPT | Performed by: INTERNAL MEDICINE

## 2019-03-19 PROCEDURE — 1036F TOBACCO NON-USER: CPT | Performed by: INTERNAL MEDICINE

## 2019-03-19 RX ORDER — AMIODARONE HYDROCHLORIDE 200 MG/1
100 TABLET ORAL EVERY EVENING
Qty: 90 TABLET | Refills: 3
Start: 2019-03-19 | End: 2019-12-12 | Stop reason: ALTCHOICE

## 2019-03-27 ENCOUNTER — ANTI-COAG VISIT (OUTPATIENT)
Dept: CARDIOLOGY CLINIC | Age: 74
End: 2019-03-27

## 2019-03-27 LAB — INR BLD: 2.1

## 2019-03-29 LAB
ALBUMIN SERPL-MCNC: 4.7 G/DL
ALP BLD-CCNC: 82 U/L
ALT SERPL-CCNC: 24 U/L
ANION GAP SERPL CALCULATED.3IONS-SCNC: 2.1 MMOL/L
AST SERPL-CCNC: 31 U/L
AVERAGE GLUCOSE: 91
BASOPHILS ABSOLUTE: 0 /ΜL
BASOPHILS RELATIVE PERCENT: 0 %
BILIRUB SERPL-MCNC: 0.6 MG/DL (ref 0.1–1.4)
BUN BLDV-MCNC: 31 MG/DL
CALCIUM SERPL-MCNC: 9.4 MG/DL
CHLORIDE BLD-SCNC: 103 MMOL/L
CHOLESTEROL, TOTAL: 268 MG/DL
CHOLESTEROL/HDL RATIO: ABNORMAL
CO2: 24 MMOL/L
CREAT SERPL-MCNC: 1.25 MG/DL
EOSINOPHILS ABSOLUTE: 0.1 /ΜL
EOSINOPHILS RELATIVE PERCENT: 2 %
GFR CALCULATED: NORMAL
GLUCOSE BLD-MCNC: 91 MG/DL
HBA1C MFR BLD: 5.8 %
HCT VFR BLD CALC: 44.1 % (ref 41–53)
HDLC SERPL-MCNC: 71 MG/DL (ref 35–70)
HEMOGLOBIN: 14.6 G/DL (ref 13.5–17.5)
LDL CHOLESTEROL CALCULATED: 177 MG/DL (ref 0–160)
LYMPHOCYTES ABSOLUTE: 1.1 /ΜL
LYMPHOCYTES RELATIVE PERCENT: 21 %
MCH RBC QN AUTO: 32 PG
MCHC RBC AUTO-ENTMCNC: 33.1 G/DL
MCV RBC AUTO: 97 FL
MONOCYTES ABSOLUTE: 0.5 /ΜL
MONOCYTES RELATIVE PERCENT: 9 %
NEUTROPHILS ABSOLUTE: 3.5 /ΜL
NEUTROPHILS RELATIVE PERCENT: 68 %
PLATELET # BLD: 190 K/ΜL
PMV BLD AUTO: NORMAL FL
POTASSIUM SERPL-SCNC: 4.7 MMOL/L
RBC # BLD: 4.56 10^6/ΜL
SODIUM BLD-SCNC: 142 MMOL/L
TOTAL PROTEIN: 6.9
TRIGL SERPL-MCNC: 98 MG/DL
TSH SERPL DL<=0.05 MIU/L-ACNC: 5.03 UIU/ML
VLDLC SERPL CALC-MCNC: 20 MG/DL
WBC # BLD: 5.3 10^3/ML

## 2019-04-02 ENCOUNTER — TELEPHONE (OUTPATIENT)
Dept: CARDIOLOGY CLINIC | Age: 74
End: 2019-04-02

## 2019-04-02 DIAGNOSIS — Z79.899 MEDICATION MANAGEMENT: Primary | ICD-10-CM

## 2019-04-02 RX ORDER — ROSUVASTATIN CALCIUM 10 MG/1
10 TABLET, COATED ORAL DAILY
Qty: 90 TABLET | Refills: 0 | Status: SHIPPED | OUTPATIENT
Start: 2019-04-02 | End: 2019-06-06 | Stop reason: SDUPTHER

## 2019-04-02 NOTE — TELEPHONE ENCOUNTER
----- Message from Harold Epley, APRN - CNP sent at 4/2/2019  9:53 AM EDT -----  Labs reviewed. Stable excepting cholesterol. LDL of 177, where goal is < 70 given his moderate coronary artery disease by last cath. He needs to be on statin. Previous notes state he is not on statin due to elevated LFT's, these are now normal.  Recommend trying low dose stating - rosuvastatin 10 mg daily and monitor lipid/ lft in 1 month.    Thank you, Gurpreet Gomez

## 2019-04-09 ENCOUNTER — PROCEDURE VISIT (OUTPATIENT)
Dept: CARDIOLOGY CLINIC | Age: 74
End: 2019-04-09

## 2019-04-09 DIAGNOSIS — Z95.0 CARDIAC PACEMAKER IN SITU: ICD-10-CM

## 2019-04-09 NOTE — LETTER
[address] [facility] [phone]  [facility]  Andrew Encinas [address] [place] [address]    Pacemaker/Defibrillator Clinic          04/09/19        Florencio Salmon  [address] [facility] [address]        Dear Florencio Salmon    This letter is to inform you that we received the transmission from your monitor at home that checks your pacemaker and/or defibrillator, or implanted heart monitor. The next date your monitor will automatically transmit will be 10/22/19. Please do not send additional routine transmissions unless specifically requested. Your device and monitor are wireless and most transmit cellularly, but please periodically check your monitor is still plugged in to the electrical outlet. If you still use the telephone land line to send please ensure the connection to the phone vaishali is secure. This will help to ensure successful automatic transmissions in the future. Also, the monitor needs to be close to you while sleeping at night. Please be aware that the remote device transmission sites are periodically monitored only during regular business hours during which simultaneous in-office device clinics are being run. If your transmission requires attention, we will contact you as soon as possible. Thank you. We will check your device in office when you see Dr. Antonio Matt on 7/9/19.         [address]

## 2019-04-09 NOTE — PROGRESS NOTES
Merlin Remote transmission of pacemaker and/or ICD, or implanted heart monitor shows normal cardiac device function. Brief AT recorded x 2 episodes. He is on coumadin.       OV RMM 7/9/19

## 2019-04-10 LAB — INR BLD: 2.1

## 2019-04-11 ENCOUNTER — ANTI-COAG VISIT (OUTPATIENT)
Dept: CARDIOLOGY CLINIC | Age: 74
End: 2019-04-11

## 2019-04-26 ENCOUNTER — ANTI-COAG VISIT (OUTPATIENT)
Dept: CARDIOLOGY CLINIC | Age: 74
End: 2019-04-26

## 2019-04-26 LAB — INR BLD: 2.4

## 2019-05-06 ENCOUNTER — TELEPHONE (OUTPATIENT)
Dept: CARDIOLOGY CLINIC | Age: 74
End: 2019-05-06

## 2019-05-06 LAB
ALBUMIN SERPL-MCNC: 4.1 G/DL (ref 3.5–5)
ALP BLD-CCNC: 82 U/L (ref 46–116)
ALT SERPL-CCNC: 53 U/L (ref 21–72)
AST SERPL-CCNC: 36 U/L (ref 17–59)
BILIRUB SERPL-MCNC: 0.19 MG/DL (ref 0–0.3)
BILIRUB SERPL-MCNC: 0.88 MG/DL (ref 0.1–1.2)
CHOLESTEROL, TOTAL: 179 MG/DL (ref 0–200)
HDLC SERPL-MCNC: 75 MG/DL (ref 40–100)
HEPATIC FUNCTION PANEL: NORMAL
LDL CHOLESTEROL DIRECT: 90 MG/DL (ref 0–160)
LIPID PANEL: NORMAL
TOTAL PROTEIN: 7 G/DL (ref 6.4–8.2)
TRIGL SERPL-MCNC: 71 MG/DL (ref 0–150)
VLDLC SERPL CALC-MCNC: 14 MG/DL

## 2019-05-07 ENCOUNTER — TELEPHONE (OUTPATIENT)
Dept: CARDIOLOGY CLINIC | Age: 74
End: 2019-05-07

## 2019-05-07 NOTE — TELEPHONE ENCOUNTER
----- Message from Salvatore Cheadle, MD sent at 5/7/2019  9:07 AM EDT -----  Excellent labs.  Agree with CPM

## 2019-05-07 NOTE — TELEPHONE ENCOUNTER
Created telephone encounter. Spoke with Duyen Barber relayed message per [facility] regarding labs. Pt verbalized understanding.

## 2019-05-22 RX ORDER — WARFARIN SODIUM 2.5 MG/1
TABLET ORAL
Qty: 90 TABLET | Refills: 0 | Status: SHIPPED | OUTPATIENT
Start: 2019-05-22 | End: 2019-06-06 | Stop reason: SDUPTHER

## 2019-05-22 NOTE — TELEPHONE ENCOUNTER
Spoke with patient. Patient has an appointment with Dr Jacobo Chapman on 7/9/19. He also has an appointment with Dr Jasmine Ryan (non intervent. Cardio) on 6/6/19. He will be out of his coumadin in 5 days. He knows to keep the appointments. Hes just needing refills.

## 2019-05-22 NOTE — TELEPHONE ENCOUNTER
Pt called the [place] office and sts that he only has 5 days left of his Warfarin. Pt does have an appt w/RM on 7-9-19 at [facility]. Pt was told that he couldn't have anymore refills until he makes an appt w/ physician. Pt does indeed have an appt. Please advise, thank you.  Pt ph# [phone]

## 2019-05-22 NOTE — TELEPHONE ENCOUNTER
This patient goes to [facility] and get's his PT/INR checked in [facility] as well.

## 2019-05-24 ENCOUNTER — ANTI-COAG VISIT (OUTPATIENT)
Dept: CARDIOLOGY CLINIC | Age: 74
End: 2019-05-24

## 2019-05-24 LAB — INR BLD: 2.3

## 2019-06-05 NOTE — PROGRESS NOTES
[address]   Cardiac Consultation    Referring Provider:  Soumya Howard MD     Chief Complaint   Patient presents with    6 Month Follow-Up    Hyperlipidemia    Hypertension    Atrial Fibrillation      Subjective:  Patient being seen today for routine cardiology follow up of HTN, HLD, PAF, PPM;      Past Medical History:   Mr Glenny Augustine 68 y.o. male former patient of my partner Dr Costa Sinclair who retired and transferred care to myself. Follows with Dr Juan Carlos Boswell for EP. He has PMH of porcine AVR and VSD closure in 04/57/11 complicated by post-op AV block and afib/flutter. He subsequently underwent . He has RFA for aflutter on 12/28/12 again 2/13/19 by Dr. Jaxon Arciniega (on coumadin regulated by our office--has home machine). He takes amiodarone and has chronically elevated LFT's. He is NOT on statin med per Dr. Costa Sinclair due to elevated LFT's on amiodarone. His stress ECHO from 06/13/16 was negative for ischemia with an EF of 60%. Note hx elevated LFTS since 2011 which have been stable. Most recent ECHO 1/9/19 EF 55% moderate concentric LVH. Grade II DD;  Moderate TAYA; normal bio. AVR; Mild to moderate TR, mild pulm HTN (no change from 12/15). Most recent Stress test 1/9/19 Unable to assess LVEF and wall motion due to afib with rapid rates  Perfusion images show areas of reversibility in the inferolateral wall c/w ischemia of this territory Subsequently, underwent most recent [facility] 1/11/19 LM <10% LAD prox, mid-distal <10%, Lcx prox-mid 60-70%, RCA prox-mid 20-30% mid distal 50-60%. Moderate LCx/RCA disease correlating with stress test.  Most recent Cardiac CTA 1/28/19 Mild-to-moderate coronary artery calcification. Most recent ALEJANDRINA 2/13/19 EF 55% concentric LVH no evidence of mass or thrombus in the left atrium or appendage. Most recent EP study ablation 2/13/19 underwent EP study with RFA of atrial fibrillation and pulmonary vein isolation ablation.  Most recent EKG 3/19/19 Electronic atrial pacemaker sinus rhythn Left axis deviation Incomplete right bundle branch block  Most recent device check 4/9/19 [facility] Remote transmission of pacemaker and/or ICD, or implanted heart monitor shows normal cardiac device function. Brief AT recorded x 2 episodes. He is on coumadin. History of Present Illness: Today he reports to feeling good overall. He states since his ablation he has not had any palpitations nor has he felt any episodes of AFIB. Denies chest pain, shortness of breath, edema, dizziness, palpitations and syncope. Works out at [employer] early AM 4-5 days per week. 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 Past Medical History:   has a past medical history of Arthritis, Atrial fibrillation ([address] CAD (coronary artery disease), Cardiac pacemaker, Hyperlipidemia, Hypertension, Mitral valve disease, and Thyroid disease. Surgical History:   has a past surgical history that includes Cardiac surgery (2009); cyst removal (1972); skin biopsy; Pacemaker insertion; Atrial ablation surgery (12-28-12); ventral hernia repair; and Diagnostic Cardiac Cath Lab Procedure. Social History:   , lives with spouse in [place]. He is retired [employer], and current  of [facility] reports that he has never smoked. He has never used smokeless tobacco. He reports that he does not drink alcohol or use drugs. Family History:  family history includes Cancer in his mother. Home Medications:  Prior to Admission medications    Medication Sig Start Date End Date Taking?  Authorizing Provider   warfarin (COUMADIN) 2.5 MG tablet One tab by mouth daily or as directed 5/22/19  Yes Sylvain Scott MD   rosuvastatin (CRESTOR) 10 MG tablet Take 1 tablet by mouth daily 4/2/19  Yes SALVADOR Zambrano CNP   amiodarone (CORDARONE) 200 MG tablet Take 0.5 tablets by mouth every evening 3/19/19  Yes Reji Heller MD   metoprolol succinate (TOPROL XL) 50 MG extended release tablet Take 1 tablet by mouth nightly 2/14/19  Yes SALVADOR Tay CNP   diltiazem (CARDIZEM CD) 180 MG extended release capsule Take 1 capsule by mouth daily 2/14/19  Yes SALVADOR Tay CNP   furosemide (LASIX) 40 MG tablet Take 1 tablet by mouth  daily 12/6/18  Yes Leslee Mendoza MD   aspirin EC 81 MG EC tablet Take 1 tablet by mouth daily 12/6/18  Yes Leslee Mendoza MD   finasteride (PROSCAR) 5 MG tablet Take 1 tablet by mouth nightly. 7/18/14  Yes Heron Hayden MD   Multiple Vitamins-Minerals (MULTIVITAMIN PO) Take 1 tablet by mouth daily. Yes Historical Provider, MD   levothyroxine (LEVOTHROID) 75 MCG tablet Take 75 mcg by mouth daily. Yes Historical Provider, MD   tamsulosin (FLOMAX) 0.4 MG capsule Take 0.4 mg by mouth daily. Yes Historical Provider, MD        Allergies:  Digoxin     Review of Systems:   · Constitutional: there has been no unanticipated weight loss. There's been no change in energy level, sleep pattern, or activity level. · Eyes: No visual changes or diplopia. No scleral icterus. · ENT: No Headaches, hearing loss or vertigo. No mouth sores or sore throat. · Cardiovascular: Reviewed in HPI  · Respiratory: No cough or wheezing, no sputum production. No hematemesis. · Gastrointestinal: No abdominal pain, appetite loss, blood in stools. No change in bowel or bladder habits. · Genitourinary: No dysuria, trouble voiding, or hematuria. · Musculoskeletal:  No gait disturbance, weakness or joint complaints. · Integumentary: No rash or pruritis. · Neurological: No headache, diplopia, change in muscle strength, numbness or tingling. No change in gait, balance, coordination, mood, affect, memory, mentation, behavior. · Psychiatric: No anxiety, no depression. · Endocrine: No malaise, fatigue or temperature intolerance. No excessive thirst, fluid intake, or urination. No tremor.   · Hematologic/Lymphatic: No abnormal bruising or bleeding, blood clots or swollen lymph nodes. · Allergic/Immunologic: No nasal congestion or hives. Physical Examination:    Vitals:    06/06/19 1253   BP: (!) 86/54   Pulse:    SpO2:     Height: 5' 10\" (1.778 m), Weight: 185 lb (83.9 kg), BP: (!) 86/54  HR 68 O2 sat 98%      Wt Readings from Last 3 Encounters:   06/06/19 185 lb (83.9 kg)   03/19/19 185 lb (83.9 kg)   02/14/19 170 lb 10.2 oz (77.4 kg)       Constitutional and General Appearance: NAD   Respiratory:  · Normal excursion and expansion without use of accessory muscles  · Resp Auscultation: Normal breath sounds without dullness  Cardiovascular:  · The apical impulses not displaced  · Heart tones are crisp and RRR  · Cervical veins are not engorged  · The carotid upstroke is normal in amplitude and contour without delay or bruit  · Normal S1S2, No S3, Soft 2/6 systolic Murmur  · Peripheral pulses are symmetrical and full  · There is no clubbing, cyanosis of the extremities. · Trace-  BLE edema  · Femoral Arteries: 2+ and equal  · Pedal Pulses: 2+ and equal   Abdomen:  · No masses or tenderness  · Liver/Spleen: No Abnormalities Noted  Neurological/Psychiatric:  · Alert and oriented in all spheres  · Moves all extremities well  · Exhibits normal gait balance and coordination  · No abnormalities of mood, affect, memory, mentation, or behavior are noted  Skin:  · Skin: warm and dry.     Lab Results   Component Value Date    CHOL 179 05/06/2019    CHOL 268 03/29/2019    CHOL 263 01/11/2018     Lab Results   Component Value Date    TRIG 71 05/06/2019    TRIG 98 03/29/2019    TRIG 113 01/11/2018     Lab Results   Component Value Date    HDL 75 05/06/2019    HDL 71 (A) 03/29/2019    HDL 74 (A) 01/11/2018     Lab Results   Component Value Date    LDLCALC 177 (A) 03/29/2019    LDLCALC 166 (A) 01/11/2018    LDLCALC 168 (H) 03/14/2011     Lab Results   Component Value Date    LABVLDL 30 03/14/2011    VLDL 14 05/06/2019    VLDL 20 03/29/2019 VLDL 23 01/11/2018     No results found for: CHOLHDLRATIO    Assessment:     1. S/P AVR (aortic valve replacement):  S/P porcine AVR and VSD closure in 12/06. Most recent ECHO 1/9/19 EF 55% moderate concentric LVH. Grade II DD;  Moderate TAYA; normal bio. AVR; Mild to moderate TR, mild pulm HTN (no change from 12/15). 2. Paroxysmal atrial fibrillation ([address]): Follows with EP partner  and is on amiodarone with chronically elevated LFT's. Most recent EP study ablation 2/13/19 underwent EP study with RFA of atrial fibrillation and pulmonary vein isolation ablation by Dr. Alex Sma. He may come off amiodarone when he sees EP doc next OV. 3. Essential hypertension:  Well controlled and will continue current medical regimen. 4. Mixed hyperlipidemia: He is NOT on statin med per Dr. Harris Alva due to elevated LFT's on amiodarone and he does not want to take them. No documented hx CAD. Encouraged good diet as best possible. I personally reviewed most recent labs from 5/6/19 I have discussed PCSK9 inhibitors prior but he is not inclined to change regimen. Plan:  1. Stable and will continue present medical regimen. 2. No need for cardiac testing at this time. 3.  Follow up 6 months  4. Risk factor modification was discussed including the importance and management of lipids, BP, diet, exercise,    5. meds refilled as warranted     Cost of prescription medications and patient compliance have been reviewed with patient. All questions answered. Thank you for allowing me to participate in the care of this individual.    This note was scribed in the presence of Kurtis Adkins MD by Wilfrido Gray RN    I, Dr. Alicia Mo, personally performed the services described in this documentation, as scribed by the above signed scribe in my presence. It is both accurate and complete to my knowledge.  I agree with the details independently gathered by the clinical support staff, while the remaining scribed note accurately describes my personal service to the patient. Jeny Contreras.  Jessica Bolanos M.D., [facility]

## 2019-06-06 ENCOUNTER — OFFICE VISIT (OUTPATIENT)
Dept: CARDIOLOGY CLINIC | Age: 74
End: 2019-06-06
Payer: MEDICARE

## 2019-06-06 ENCOUNTER — ANTI-COAG VISIT (OUTPATIENT)
Dept: CARDIOLOGY CLINIC | Age: 74
End: 2019-06-06

## 2019-06-06 VITALS
DIASTOLIC BLOOD PRESSURE: 54 MMHG | HEIGHT: 70 IN | BODY MASS INDEX: 26.48 KG/M2 | SYSTOLIC BLOOD PRESSURE: 86 MMHG | WEIGHT: 185 LBS | OXYGEN SATURATION: 98 % | HEART RATE: 68 BPM

## 2019-06-06 DIAGNOSIS — Z95.2 S/P AVR (AORTIC VALVE REPLACEMENT): Chronic | ICD-10-CM

## 2019-06-06 DIAGNOSIS — E78.2 MIXED HYPERLIPIDEMIA: Chronic | ICD-10-CM

## 2019-06-06 DIAGNOSIS — Z95.0 CARDIAC PACEMAKER IN SITU: Chronic | ICD-10-CM

## 2019-06-06 DIAGNOSIS — I48.0 PAROXYSMAL ATRIAL FIBRILLATION (HCC): Primary | Chronic | ICD-10-CM

## 2019-06-06 DIAGNOSIS — I10 ESSENTIAL HYPERTENSION: Chronic | ICD-10-CM

## 2019-06-06 LAB — INR BLD: 2

## 2019-06-06 PROCEDURE — 1123F ACP DISCUSS/DSCN MKR DOCD: CPT | Performed by: INTERNAL MEDICINE

## 2019-06-06 PROCEDURE — 99214 OFFICE O/P EST MOD 30 MIN: CPT | Performed by: INTERNAL MEDICINE

## 2019-06-06 PROCEDURE — G8417 CALC BMI ABV UP PARAM F/U: HCPCS | Performed by: INTERNAL MEDICINE

## 2019-06-06 PROCEDURE — 4040F PNEUMOC VAC/ADMIN/RCVD: CPT | Performed by: INTERNAL MEDICINE

## 2019-06-06 PROCEDURE — 3017F COLORECTAL CA SCREEN DOC REV: CPT | Performed by: INTERNAL MEDICINE

## 2019-06-06 PROCEDURE — 1036F TOBACCO NON-USER: CPT | Performed by: INTERNAL MEDICINE

## 2019-06-06 PROCEDURE — G8598 ASA/ANTIPLAT THER USED: HCPCS | Performed by: INTERNAL MEDICINE

## 2019-06-06 PROCEDURE — G8427 DOCREV CUR MEDS BY ELIG CLIN: HCPCS | Performed by: INTERNAL MEDICINE

## 2019-06-06 RX ORDER — ROSUVASTATIN CALCIUM 10 MG/1
10 TABLET, COATED ORAL DAILY
Qty: 90 TABLET | Refills: 3 | Status: SHIPPED | OUTPATIENT
Start: 2019-06-06 | End: 2020-06-18 | Stop reason: ALTCHOICE

## 2019-06-06 RX ORDER — METOPROLOL SUCCINATE 50 MG/1
50 TABLET, EXTENDED RELEASE ORAL NIGHTLY
Qty: 90 TABLET | Refills: 3 | Status: SHIPPED | OUTPATIENT
Start: 2019-06-06 | End: 2019-12-12 | Stop reason: SDUPTHER

## 2019-06-06 RX ORDER — WARFARIN SODIUM 2.5 MG/1
TABLET ORAL
Qty: 90 TABLET | Refills: 3 | Status: SHIPPED | OUTPATIENT
Start: 2019-06-06 | End: 2020-06-18 | Stop reason: SDUPTHER

## 2019-06-06 RX ORDER — DILTIAZEM HYDROCHLORIDE 180 MG/1
180 CAPSULE, COATED, EXTENDED RELEASE ORAL DAILY
Qty: 90 CAPSULE | Refills: 3 | Status: SHIPPED | OUTPATIENT
Start: 2019-06-06 | End: 2019-12-12 | Stop reason: SDUPTHER

## 2019-06-06 RX ORDER — WARFARIN SODIUM 2.5 MG/1
TABLET ORAL
Qty: 90 TABLET | Refills: 3 | Status: SHIPPED | OUTPATIENT
Start: 2019-06-06 | End: 2019-06-06 | Stop reason: SDUPTHER

## 2019-06-06 NOTE — LETTER
[address]   Cardiac Consultation    Referring Provider:  Ericka Wilkerson MD     Chief Complaint   Patient presents with    6 Month Follow-Up    Hyperlipidemia    Hypertension    Atrial Fibrillation      Subjective:  Patient being seen today for routine cardiology follow up of HTN, HLD, PAF, PPM;      Past Medical History:   Mr Renita Chu 68 y.o. male former patient of my partner Dr Zhou Brian who retired and transferred care to myself. Follows with Dr Laura Ahuja for EP. He has PMH of porcine AVR and VSD closure in 99/83/31 complicated by post-op AV block and afib/flutter. He subsequently underwent . He has RFA for aflutter on 12/28/12 again 2/13/19 by Dr. Anat Arreola (on coumadin regulated by our office--has home machine). He takes amiodarone and has chronically elevated LFT's. He is NOT on statin med per Dr. Zhou Brian due to elevated LFT's on amiodarone. His stress ECHO from 06/13/16 was negative for ischemia with an EF of 60%. Note hx elevated LFTS since 2011 which have been stable. Most recent ECHO 1/9/19 EF 55% moderate concentric LVH. Grade II DD;  Moderate TAYA; normal bio. AVR; Mild to moderate TR, mild pulm HTN (no change from 12/15). Most recent Stress test 1/9/19 Unable to assess LVEF and wall motion due to afib with rapid rates  Perfusion images show areas of reversibility in the inferolateral wall c/w ischemia of this territory Subsequently, underwent most recent [facility] 1/11/19 LM <10% LAD prox, mid-distal <10%, Lcx prox-mid 60-70%, RCA prox-mid 20-30% mid distal 50-60%. Moderate LCx/RCA disease correlating with stress test.  Most recent Cardiac CTA 1/28/19 Mild-to-moderate coronary artery calcification. Most recent ALEJANDRINA 2/13/19 EF 55% concentric LVH no evidence of mass or thrombus in the left atrium or appendage. Most recent EP study ablation 2/13/19 underwent EP study with RFA of atrial fibrillation and pulmonary vein isolation ablation.  Most recent EKG 3/19/19 Electronic atrial pacemaker sinus rhythn Left axis deviation Incomplete right bundle branch block  Most recent device check 4/9/19 Mer[facility] Remote transmission of pacemaker and/or ICD, or implanted heart monitor shows normal cardiac device function. Brief AT recorded x 2 episodes. He is on coumadin. History of Present Illness: Today he reports to feeling good overall. He states since his ablation he has not had any palpitations nor has he felt any episodes of AFIB. Denies chest pain, shortness of breath, edema, dizziness, palpitations and syncope. Works out at [employer] early AM 4-5 days per week. 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's department. Past Medical History:   has a past medical history of Arthritis, Atrial fibrillation ([address] CAD (coronary artery disease), Cardiac pacemaker, Hyperlipidemia, Hypertension, Mitral valve disease, and Thyroid disease. Surgical History:   has a past surgical history that includes Cardiac surgery (2009); cyst removal (1972); skin biopsy; Pacemaker insertion; Atrial ablation surgery (12-28-12); ventral hernia repair; and Diagnostic Cardiac Cath Lab Procedure. Social History:   , [place] in [place]. He is retired [employer], and current  of [facility] reports that he has never smoked. He has never used smokeless tobacco. He reports that he does not drink alcohol or use drugs. Family History:  family history includes Cancer in his mother. Home Medications:  Prior to Admission medications    Medication Sig Start Date End Date Taking?  Authorizing Provider   warfarin (COUMADIN) 2.5 MG tablet One tab by mouth daily or as directed 5/22/19  Yes Andreas Hill MD   rosuvastatin (CRESTOR) 10 MG tablet Take 1 tablet by mouth daily 4/2/19  Yes SALVADOR Cortez - CNP   amiodarone (CORDARONE) 200 MG tablet Take 0.5 tablets by mouth every evening 3/19/19  Yes Toyn Rivera MD metoprolol succinate (TOPROL XL) 50 MG extended release tablet Take 1 tablet by mouth nightly 2/14/19  Yes SALVADOR Ward CNP   diltiazem (CARDIZEM CD) 180 MG extended release capsule Take 1 capsule by mouth daily 2/14/19  Yes SALVADOR Ward CNP   furosemide (LASIX) 40 MG tablet Take 1 tablet by mouth  daily 12/6/18  Yes Brittany Hui MD   aspirin EC 81 MG EC tablet Take 1 tablet by mouth daily 12/6/18  Yes Brittany Hui MD   finasteride (PROSCAR) 5 MG tablet Take 1 tablet by mouth nightly. 7/18/14  Yes Rika Hicks MD   Multiple Vitamins-Minerals (MULTIVITAMIN PO) Take 1 tablet by mouth daily. Yes Historical Provider, MD   levothyroxine (LEVOTHROID) 75 MCG tablet Take 75 mcg by mouth daily. Yes Historical Provider, MD   tamsulosin (FLOMAX) 0.4 MG capsule Take 0.4 mg by mouth daily. Yes Historical Provider, MD        Allergies:  Digoxin     Review of Systems:   · Constitutional: there has been no unanticipated weight loss. There's been no change in energy level, sleep pattern, or activity level. · Eyes: No visual changes or diplopia. No scleral icterus. · ENT: No Headaches, hearing loss or vertigo. No mouth sores or sore throat. · Cardiovascular: Reviewed in HPI  · Respiratory: No cough or wheezing, no sputum production. No hematemesis. · Gastrointestinal: No abdominal pain, appetite loss, blood in stools. No change in bowel or bladder habits. · Genitourinary: No dysuria, trouble voiding, or hematuria. · Musculoskeletal:  No gait disturbance, weakness or joint complaints. · Integumentary: No rash or pruritis. · Neurological: No headache, diplopia, change in muscle strength, numbness or tingling. No change in gait, balance, coordination, mood, affect, memory, mentation, behavior. · Psychiatric: No anxiety, no depression. · Endocrine: No malaise, fatigue or temperature intolerance. No excessive thirst, fluid intake, or urination. No tremor. · Hematologic/Lymphatic: No abnormal bruising or bleeding, blood clots or swollen lymph nodes. · Allergic/Immunologic: No nasal congestion or hives. Physical Examination:    Vitals:    06/06/19 1253   BP: (!) 86/54   Pulse:    SpO2:     Height: 5' 10\" (1.778 m), Weight: 185 lb (83.9 kg), BP: (!) 86/54  HR 68 O2 sat 98%      Wt Readings from Last 3 Encounters:   06/06/19 185 lb (83.9 kg)   03/19/19 185 lb (83.9 kg)   02/14/19 170 lb 10.2 oz (77.4 kg)       Constitutional and General Appearance: NAD   Respiratory:  · Normal excursion and expansion without use of accessory muscles  · Resp Auscultation: Normal breath sounds without dullness  Cardiovascular:  · The apical impulses not displaced  · Heart tones are crisp and RRR  · Cervical veins are not engorged  · The carotid upstroke is normal in amplitude and contour without delay or bruit  · Normal S1S2, No S3, Soft 2/6 systolic Murmur  · Peripheral pulses are symmetrical and full  · There is no clubbing, cyanosis of the extremities. · Trace-  BLE edema  · Femoral Arteries: 2+ and equal  · Pedal Pulses: 2+ and equal   Abdomen:  · No masses or tenderness  · Liver/Spleen: No Abnormalities Noted  Neurological/Psychiatric:  · Alert and oriented in all spheres  · Moves all extremities well  · Exhibits normal gait balance and coordination  · No abnormalities of mood, affect, memory, mentation, or behavior are noted  Skin:  · Skin: warm and dry.     Lab Results   Component Value Date    CHOL 179 05/06/2019    CHOL 268 03/29/2019    CHOL 263 01/11/2018     Lab Results   Component Value Date    TRIG 71 05/06/2019    TRIG 98 03/29/2019    TRIG 113 01/11/2018     Lab Results   Component Value Date    HDL 75 05/06/2019    HDL 71 (A) 03/29/2019    HDL 74 (A) 01/11/2018     Lab Results   Component Value Date    LDLCALC 177 (A) 03/29/2019    LDLCALC 166 (A) 01/11/2018    LDLCALC 168 (H) 03/14/2011     Lab Results   Component Value Date    LABVLDL 30 03/14/2011 independently gathered by the clinical support staff, while the remaining scribed note accurately describes my personal service to the patient. Yuly Aguilar.  Gurmeet Montana M.D., [facility]

## 2019-06-06 NOTE — PATIENT INSTRUCTIONS
Plan:  1. Stable and will continue present medical regimen. 2. No need for cardiac testing at this time. 3.  Follow up 6 months  4.  Risk factor modification was discussed including the importance and management of lipids, BP, diet, exercise,    5. meds refilled as warranted

## 2019-07-03 LAB — INR BLD: 2.1

## 2019-07-05 ENCOUNTER — ANTI-COAG VISIT (OUTPATIENT)
Dept: CARDIOLOGY CLINIC | Age: 74
End: 2019-07-05

## 2019-07-08 NOTE — PROGRESS NOTES
[address]   Electrophysiology Follow Up  Date: 7/9/2019     CC: Atrial fibrillation   HPI: Liz Penn is a 68 y.o.  male with a history of permanent pacemaker for temporary AV block post AVR (porcine), [place], and closure of VSD in 12/06. He had a radiofrequency catheter ablation for atrial flutter on 12/28/12. He has a history of PAF. Amiodarone was increased from 150 mg to 200 mg daily. He is on coumadin for anticoagulation. S/p 2/13/19 - Pulmonary vein isolations using wide area circumferential radiofrequency ablation. Additional ablation of CTI right atrial flutter     Dasiaantoine Kyler states he is feeling well today. He states his weight is more stable now and it is staying down and he is not retaining fluid anymore. Patient denies lightheadedness, dizziness, chest pain, palpitations, orthopnea, edema, presyncope or syncope. Past Medical History:   Diagnosis Date    Arthritis     Atrial fibrillation ([address]     CAD (coronary artery disease)     Cardiac pacemaker     Hyperlipidemia     Hypertension     Mitral valve disease     Thyroid disease         Past Surgical History:   Procedure Laterality Date    ATRIAL ABLATION SURGERY  12-28-12    ablation of IVC-Tricuspid Valve    CARDIAC SURGERY  2009    AVR pig    CYST REMOVAL  1972    DIAGNOSTIC CARDIAC CATH LAB PROCEDURE      PACEMAKER INSERTION      A-fib/flutter    SKIN BIOPSY      VENTRAL HERNIA REPAIR         Allergies   Allergen Reactions    Digoxin Hives       Social History:   reports that he has never smoked. He has never used smokeless tobacco. He reports that he does not drink alcohol or use drugs. Family History:  family history includes Cancer in his mother. Review of System:  All other systems reviewed and are negative except for that noted above.  Pertinent negatives are:   General: negative for fever, chills   Ophthalmic ROS: negative for - eye pain or loss of vision  ENT ROS: negative for - headaches, sore atrium or appendage.     STRESS ECHO:6/13/16   Rhythm: Atrial paced rhythm HR: 64 bpm BP: 114/70 mmHg Conclusions Summary Normal echocardiographic response to stress, but abnormal EKG response. Echo Baseline resting echocardiogram shows normal global LV systolic function with an ejection fraction of 60% and uniform myocardial segmental wall motion. Following stress there was uniform augmentation of all myocardial segments with appropriate hyperdynamic LV systolic response to stress. ECG Development of nonspecific bundle branch jose luis during exercise at a rate of approximately 100 beats per minute. Resolution during recovery below 100 beats per minute. Arrhythmias Occasional premature ventricular contractions. 4 beats nonsustained ventricular tachycardia during recovery. Symptoms No symptoms with exercise . Echo 2/1/15   Summary -Normal left ventricle size, wall thickness and systolic function with an estimated ejection fraction of 55%. -Mitral annular calcification is present. Mild mitral regurgitation is present. -A porcine aortic valve appears well seated with a maximum gradient of 18 mmHg and a mean gradient of 12 mmHg. Mild aortic regurgitation is present. -Pacer / ICD wire is visualized in the right ventricle. -There is mild to moderate tricuspid regurgitation with RVSP estimated at 40 mmHg. -Mild pulmonic regurgitation present.        Medication:  Current Outpatient Medications   Medication Sig Dispense Refill    rosuvastatin (CRESTOR) 10 MG tablet Take 1 tablet by mouth daily 90 tablet 3    metoprolol succinate (TOPROL XL) 50 MG extended release tablet Take 1 tablet by mouth nightly 90 tablet 3    diltiazem (CARDIZEM CD) 180 MG extended release capsule Take 1 capsule by mouth daily 90 capsule 3    warfarin (COUMADIN) 2.5 MG tablet One tab by mouth daily or as directed 90 tablet 3    amiodarone (CORDARONE) 200 MG tablet Take 0.5 tablets by mouth every evening 90 tablet 3    furosemide (LASIX) 40 MG tablet Take 1 tablet by mouth  daily 90 tablet 3    aspirin EC 81 MG EC tablet Take 1 tablet by mouth daily 90 tablet 3    finasteride (PROSCAR) 5 MG tablet Take 1 tablet by mouth nightly. 90 tablet 3    Multiple Vitamins-Minerals (MULTIVITAMIN PO) Take 1 tablet by mouth daily.  levothyroxine (LEVOTHROID) 75 MCG tablet Take 75 mcg by mouth daily.  tamsulosin (FLOMAX) 0.4 MG capsule Take 0.4 mg by mouth daily. No current facility-administered medications for this visit. Assessment and plan:     Paroxysmal atrial fibrillation     2/13/19 - PVI and CTI right atrial flutter ablation    Sinus rhythm per ECG      Feeling much better since the ablation. Denies having any palpitation no chest pressure or shortness of breath. Device interrogation shows episodes of atrial fibrillation June 8th and 17th       Decrease Amiodarone to 100 mg every other day   On coumadin for [facility]      Discussed treatment with Amiodarone. Risks, benefits and alternative were explained. Dicussed side effects of amiodarone therapy. Patient would like to proceed with therapy. [facility] PPM  The CIED was interrogated and programmed and I supervised and reviewed all the data. All findings and changes are in device interrogation sheat and reflect my personal interpretation and changes and is scanned to Epic. A paced 82% V paced <1%      Bradycardia    A paced 82% V paced <1%   Discussed with patient. Follow up with device clinic as scheduled. HTN:    Vitals:    07/09/19 0804   BP: 125/77   Pulse: 60   Resp: 16      Controlled. Continue current medications. HLD    (1/11/18)     S/p [address] [place] bioprosthesis:    ALEJANDRINA with normal functioning valve. F/u October      Thank you for allowing me to participate in the care of Bev Bartholomew.  Further evaluation will be based upon the patient's clinical course and testing results. All questions and concerns were addressed to the patient/family.

## 2019-07-09 ENCOUNTER — PROCEDURE VISIT (OUTPATIENT)
Dept: CARDIOLOGY CLINIC | Age: 74
End: 2019-07-09
Payer: MEDICARE

## 2019-07-09 ENCOUNTER — OFFICE VISIT (OUTPATIENT)
Dept: CARDIOLOGY CLINIC | Age: 74
End: 2019-07-09
Payer: MEDICARE

## 2019-07-09 VITALS
RESPIRATION RATE: 16 BRPM | HEIGHT: 70 IN | SYSTOLIC BLOOD PRESSURE: 125 MMHG | DIASTOLIC BLOOD PRESSURE: 77 MMHG | HEART RATE: 60 BPM | WEIGHT: 182 LBS | BODY MASS INDEX: 26.05 KG/M2

## 2019-07-09 DIAGNOSIS — I44.2 ATRIOVENTRICULAR BLOCK, COMPLETE (HCC): Chronic | ICD-10-CM

## 2019-07-09 DIAGNOSIS — I10 ESSENTIAL HYPERTENSION: Chronic | ICD-10-CM

## 2019-07-09 DIAGNOSIS — E78.2 MIXED HYPERLIPIDEMIA: Chronic | ICD-10-CM

## 2019-07-09 DIAGNOSIS — Z95.0 CARDIAC PACEMAKER IN SITU: Chronic | ICD-10-CM

## 2019-07-09 DIAGNOSIS — Z95.0 CARDIAC PACEMAKER IN SITU: ICD-10-CM

## 2019-07-09 DIAGNOSIS — I48.0 PAROXYSMAL ATRIAL FIBRILLATION (HCC): Primary | ICD-10-CM

## 2019-07-09 PROCEDURE — 99214 OFFICE O/P EST MOD 30 MIN: CPT | Performed by: INTERNAL MEDICINE

## 2019-07-09 PROCEDURE — G8417 CALC BMI ABV UP PARAM F/U: HCPCS | Performed by: INTERNAL MEDICINE

## 2019-07-09 PROCEDURE — 1036F TOBACCO NON-USER: CPT | Performed by: INTERNAL MEDICINE

## 2019-07-09 PROCEDURE — 4040F PNEUMOC VAC/ADMIN/RCVD: CPT | Performed by: INTERNAL MEDICINE

## 2019-07-09 PROCEDURE — 3017F COLORECTAL CA SCREEN DOC REV: CPT | Performed by: INTERNAL MEDICINE

## 2019-07-09 PROCEDURE — 93280 PM DEVICE PROGR EVAL DUAL: CPT | Performed by: INTERNAL MEDICINE

## 2019-07-09 PROCEDURE — G8427 DOCREV CUR MEDS BY ELIG CLIN: HCPCS | Performed by: INTERNAL MEDICINE

## 2019-07-09 PROCEDURE — G8598 ASA/ANTIPLAT THER USED: HCPCS | Performed by: INTERNAL MEDICINE

## 2019-07-09 PROCEDURE — 1123F ACP DISCUSS/DSCN MKR DOCD: CPT | Performed by: INTERNAL MEDICINE

## 2019-07-09 NOTE — PROGRESS NOTES
Patient comes in for programming evaluation for his pacemaker. All sensing and pacing parameters are within normal range. Noted AF, pt remains on coumadin. No changes need to be made at this time. Please see interrogation for more detail. Patient will see Dr. Niki Lyon today.

## 2019-07-17 LAB — INR BLD: 2

## 2019-07-18 ENCOUNTER — ANTI-COAG VISIT (OUTPATIENT)
Dept: CARDIOLOGY CLINIC | Age: 74
End: 2019-07-18

## 2019-08-01 LAB — INR BLD: 2.9

## 2019-08-02 ENCOUNTER — ANTI-COAG VISIT (OUTPATIENT)
Dept: CARDIOLOGY CLINIC | Age: 74
End: 2019-08-02

## 2019-08-14 LAB — INR BLD: 2.3

## 2019-08-15 ENCOUNTER — ANTI-COAG VISIT (OUTPATIENT)
Dept: CARDIOLOGY CLINIC | Age: 74
End: 2019-08-15

## 2019-08-28 LAB — INR BLD: 2.1

## 2019-08-29 ENCOUNTER — ANTI-COAG VISIT (OUTPATIENT)
Dept: CARDIOLOGY CLINIC | Age: 74
End: 2019-08-29

## 2019-09-25 LAB — INR BLD: 2.1

## 2019-09-30 ENCOUNTER — ANTI-COAG VISIT (OUTPATIENT)
Dept: CARDIOLOGY CLINIC | Age: 74
End: 2019-09-30

## 2019-10-09 LAB — INR BLD: 2.8

## 2019-10-10 ENCOUNTER — ANTI-COAG VISIT (OUTPATIENT)
Dept: CARDIOLOGY CLINIC | Age: 74
End: 2019-10-10

## 2019-10-23 LAB — INR BLD: 2

## 2019-10-25 ENCOUNTER — ANTI-COAG VISIT (OUTPATIENT)
Dept: CARDIOLOGY CLINIC | Age: 74
End: 2019-10-25

## 2019-11-05 ENCOUNTER — OFFICE VISIT (OUTPATIENT)
Dept: CARDIOLOGY CLINIC | Age: 74
End: 2019-11-05
Payer: MEDICARE

## 2019-11-05 ENCOUNTER — NURSE ONLY (OUTPATIENT)
Dept: CARDIOLOGY CLINIC | Age: 74
End: 2019-11-05
Payer: MEDICARE

## 2019-11-05 VITALS
SYSTOLIC BLOOD PRESSURE: 121 MMHG | BODY MASS INDEX: 26.77 KG/M2 | HEIGHT: 70 IN | HEART RATE: 68 BPM | RESPIRATION RATE: 18 BRPM | WEIGHT: 187 LBS | DIASTOLIC BLOOD PRESSURE: 72 MMHG

## 2019-11-05 DIAGNOSIS — R00.1 BRADYCARDIA: ICD-10-CM

## 2019-11-05 DIAGNOSIS — Z95.0 S/P PLACEMENT OF CARDIAC PACEMAKER: ICD-10-CM

## 2019-11-05 DIAGNOSIS — Z95.0 CARDIAC PACEMAKER IN SITU: ICD-10-CM

## 2019-11-05 DIAGNOSIS — I44.2 ATRIOVENTRICULAR BLOCK, COMPLETE (HCC): Chronic | ICD-10-CM

## 2019-11-05 DIAGNOSIS — I48.0 PAROXYSMAL ATRIAL FIBRILLATION (HCC): Primary | ICD-10-CM

## 2019-11-05 PROCEDURE — G8417 CALC BMI ABV UP PARAM F/U: HCPCS | Performed by: INTERNAL MEDICINE

## 2019-11-05 PROCEDURE — 1036F TOBACCO NON-USER: CPT | Performed by: INTERNAL MEDICINE

## 2019-11-05 PROCEDURE — 99214 OFFICE O/P EST MOD 30 MIN: CPT | Performed by: INTERNAL MEDICINE

## 2019-11-05 PROCEDURE — G8484 FLU IMMUNIZE NO ADMIN: HCPCS | Performed by: INTERNAL MEDICINE

## 2019-11-05 PROCEDURE — G8427 DOCREV CUR MEDS BY ELIG CLIN: HCPCS | Performed by: INTERNAL MEDICINE

## 2019-11-05 PROCEDURE — 4040F PNEUMOC VAC/ADMIN/RCVD: CPT | Performed by: INTERNAL MEDICINE

## 2019-11-05 PROCEDURE — G8598 ASA/ANTIPLAT THER USED: HCPCS | Performed by: INTERNAL MEDICINE

## 2019-11-05 PROCEDURE — 93280 PM DEVICE PROGR EVAL DUAL: CPT | Performed by: INTERNAL MEDICINE

## 2019-11-05 PROCEDURE — 3017F COLORECTAL CA SCREEN DOC REV: CPT | Performed by: INTERNAL MEDICINE

## 2019-11-05 PROCEDURE — 1123F ACP DISCUSS/DSCN MKR DOCD: CPT | Performed by: INTERNAL MEDICINE

## 2019-11-06 LAB — INR BLD: 2

## 2019-11-07 ENCOUNTER — ANTI-COAG VISIT (OUTPATIENT)
Dept: CARDIOLOGY CLINIC | Age: 74
End: 2019-11-07

## 2019-11-21 ENCOUNTER — ANTI-COAG VISIT (OUTPATIENT)
Dept: CARDIOLOGY CLINIC | Age: 74
End: 2019-11-21

## 2019-11-21 ENCOUNTER — TELEPHONE (OUTPATIENT)
Dept: CARDIOLOGY CLINIC | Age: 74
End: 2019-11-21

## 2019-11-21 LAB — INR BLD: 1.6

## 2019-12-04 ENCOUNTER — ANTI-COAG VISIT (OUTPATIENT)
Dept: CARDIOLOGY CLINIC | Age: 74
End: 2019-12-04

## 2019-12-04 ENCOUNTER — TELEPHONE (OUTPATIENT)
Dept: CARDIOLOGY CLINIC | Age: 74
End: 2019-12-04

## 2019-12-04 LAB — INR BLD: 1.5

## 2019-12-11 ENCOUNTER — ANTI-COAG VISIT (OUTPATIENT)
Dept: CARDIOLOGY CLINIC | Age: 74
End: 2019-12-11

## 2019-12-11 DIAGNOSIS — I48.91 ATRIAL FIBRILLATION WITH RVR (HCC): Primary | ICD-10-CM

## 2019-12-11 LAB — INR BLD: 1.8

## 2019-12-12 ENCOUNTER — TELEPHONE (OUTPATIENT)
Dept: CARDIOLOGY CLINIC | Age: 74
End: 2019-12-12

## 2019-12-12 ENCOUNTER — OFFICE VISIT (OUTPATIENT)
Dept: CARDIOLOGY CLINIC | Age: 74
End: 2019-12-12
Payer: MEDICARE

## 2019-12-12 ENCOUNTER — HOSPITAL ENCOUNTER (OUTPATIENT)
Age: 74
Discharge: HOME | End: 2019-12-12
Payer: MEDICARE

## 2019-12-12 VITALS
HEART RATE: 64 BPM | DIASTOLIC BLOOD PRESSURE: 60 MMHG | WEIGHT: 186.12 LBS | BODY MASS INDEX: 26.64 KG/M2 | OXYGEN SATURATION: 98 % | HEIGHT: 70 IN | SYSTOLIC BLOOD PRESSURE: 100 MMHG

## 2019-12-12 DIAGNOSIS — E78.2 MIXED HYPERLIPIDEMIA: Chronic | ICD-10-CM

## 2019-12-12 DIAGNOSIS — I48.91 ATRIAL FIBRILLATION WITH RVR (HCC): ICD-10-CM

## 2019-12-12 DIAGNOSIS — Z95.2 S/P AVR (AORTIC VALVE REPLACEMENT): Primary | Chronic | ICD-10-CM

## 2019-12-12 DIAGNOSIS — I48.0 PAF (PAROXYSMAL ATRIAL FIBRILLATION) (HCC): ICD-10-CM

## 2019-12-12 DIAGNOSIS — I10 ESSENTIAL HYPERTENSION: Chronic | ICD-10-CM

## 2019-12-12 LAB
INR BLD: 1.77 (ref 0.86–1.14)
PROTHROMBIN TIME: 20.6 SEC (ref 10–13.2)

## 2019-12-12 PROCEDURE — G8598 ASA/ANTIPLAT THER USED: HCPCS | Performed by: INTERNAL MEDICINE

## 2019-12-12 PROCEDURE — 3017F COLORECTAL CA SCREEN DOC REV: CPT | Performed by: INTERNAL MEDICINE

## 2019-12-12 PROCEDURE — G8484 FLU IMMUNIZE NO ADMIN: HCPCS | Performed by: INTERNAL MEDICINE

## 2019-12-12 PROCEDURE — 85610 PROTHROMBIN TIME: CPT

## 2019-12-12 PROCEDURE — G8417 CALC BMI ABV UP PARAM F/U: HCPCS | Performed by: INTERNAL MEDICINE

## 2019-12-12 PROCEDURE — G8427 DOCREV CUR MEDS BY ELIG CLIN: HCPCS | Performed by: INTERNAL MEDICINE

## 2019-12-12 PROCEDURE — 1123F ACP DISCUSS/DSCN MKR DOCD: CPT | Performed by: INTERNAL MEDICINE

## 2019-12-12 PROCEDURE — 36415 COLL VENOUS BLD VENIPUNCTURE: CPT

## 2019-12-12 PROCEDURE — 99214 OFFICE O/P EST MOD 30 MIN: CPT | Performed by: INTERNAL MEDICINE

## 2019-12-12 PROCEDURE — 1036F TOBACCO NON-USER: CPT | Performed by: INTERNAL MEDICINE

## 2019-12-12 PROCEDURE — 4040F PNEUMOC VAC/ADMIN/RCVD: CPT | Performed by: INTERNAL MEDICINE

## 2019-12-12 RX ORDER — METOPROLOL SUCCINATE 50 MG/1
50 TABLET, EXTENDED RELEASE ORAL NIGHTLY
Qty: 90 TABLET | Refills: 3 | Status: SHIPPED | OUTPATIENT
Start: 2019-12-12 | End: 2020-11-10

## 2019-12-12 RX ORDER — FUROSEMIDE 40 MG/1
TABLET ORAL
Qty: 90 TABLET | Refills: 3 | Status: SHIPPED | OUTPATIENT
Start: 2019-12-12 | End: 2020-11-19 | Stop reason: SDUPTHER

## 2019-12-12 RX ORDER — DILTIAZEM HYDROCHLORIDE 180 MG/1
180 CAPSULE, COATED, EXTENDED RELEASE ORAL DAILY
Qty: 90 CAPSULE | Refills: 3 | Status: SHIPPED | OUTPATIENT
Start: 2019-12-12 | End: 2020-01-28 | Stop reason: SDUPTHER

## 2019-12-18 ENCOUNTER — ANTI-COAG VISIT (OUTPATIENT)
Dept: CARDIOLOGY CLINIC | Age: 74
End: 2019-12-18

## 2019-12-18 LAB — INR BLD: 1.8

## 2019-12-24 ENCOUNTER — ANTI-COAG VISIT (OUTPATIENT)
Dept: CARDIOLOGY CLINIC | Age: 74
End: 2019-12-24

## 2019-12-24 LAB — INR BLD: 2.5

## 2019-12-30 ENCOUNTER — ANTI-COAG VISIT (OUTPATIENT)
Dept: CARDIOLOGY CLINIC | Age: 74
End: 2019-12-30

## 2019-12-30 ENCOUNTER — TELEPHONE (OUTPATIENT)
Dept: CARDIOLOGY CLINIC | Age: 74
End: 2019-12-30

## 2019-12-30 LAB — INR BLD: 2

## 2020-01-06 ENCOUNTER — TELEPHONE (OUTPATIENT)
Dept: CARDIOLOGY CLINIC | Age: 75
End: 2020-01-06

## 2020-01-06 ENCOUNTER — ANTI-COAG VISIT (OUTPATIENT)
Dept: CARDIOLOGY CLINIC | Age: 75
End: 2020-01-06

## 2020-01-06 LAB — INR BLD: 1.9

## 2020-01-06 NOTE — TELEPHONE ENCOUNTER
LMOVM. INR is 1.9. Per protocol, dosing change is 3.75 mg Mon and 2.5 mg all other days. Recheck 1 week(s). Provider notified. ~ iNdia Ocampo RN. LMOVM relaying message.

## 2020-01-13 ENCOUNTER — TELEPHONE (OUTPATIENT)
Dept: CARDIOLOGY CLINIC | Age: 75
End: 2020-01-13

## 2020-01-13 ENCOUNTER — ANTI-COAG VISIT (OUTPATIENT)
Dept: CARDIOLOGY CLINIC | Age: 75
End: 2020-01-13

## 2020-01-13 LAB — INR BLD: 2.4

## 2020-01-13 NOTE — TELEPHONE ENCOUNTER
01/13/20: INR is 2.4. Per protocol, no changes. Recheck 2 week(s). Provider notified. ~ Sanjiv Holman RN. JUSTO relaying message.

## 2020-01-27 ENCOUNTER — ANTI-COAG VISIT (OUTPATIENT)
Dept: CARDIOLOGY CLINIC | Age: 75
End: 2020-01-27

## 2020-01-27 ENCOUNTER — TELEPHONE (OUTPATIENT)
Dept: CARDIOLOGY CLINIC | Age: 75
End: 2020-01-27

## 2020-01-27 LAB — INR BLD: 3

## 2020-01-28 RX ORDER — DILTIAZEM HYDROCHLORIDE 180 MG/1
180 CAPSULE, COATED, EXTENDED RELEASE ORAL DAILY
Qty: 90 CAPSULE | Refills: 3 | Status: SHIPPED | OUTPATIENT
Start: 2020-01-28 | End: 2020-11-19 | Stop reason: SDUPTHER

## 2020-02-10 ENCOUNTER — ANTI-COAG VISIT (OUTPATIENT)
Dept: CARDIOLOGY CLINIC | Age: 75
End: 2020-02-10

## 2020-02-10 LAB — INR BLD: 2.7

## 2020-02-24 ENCOUNTER — ANTI-COAG VISIT (OUTPATIENT)
Dept: CARDIOLOGY CLINIC | Age: 75
End: 2020-02-24

## 2020-02-24 LAB — INR BLD: 2.3

## 2020-02-25 ENCOUNTER — NURSE ONLY (OUTPATIENT)
Dept: CARDIOLOGY CLINIC | Age: 75
End: 2020-02-25
Payer: MEDICARE

## 2020-02-25 PROCEDURE — 93296 REM INTERROG EVL PM/IDS: CPT | Performed by: INTERNAL MEDICINE

## 2020-02-25 PROCEDURE — 93294 REM INTERROG EVL PM/LDLS PM: CPT | Performed by: INTERNAL MEDICINE

## 2020-03-10 ENCOUNTER — ANTI-COAG VISIT (OUTPATIENT)
Dept: CARDIOLOGY CLINIC | Age: 75
End: 2020-03-10

## 2020-03-10 LAB — INR BLD: 2

## 2020-03-23 ENCOUNTER — ANTI-COAG VISIT (OUTPATIENT)
Dept: CARDIOLOGY CLINIC | Age: 75
End: 2020-03-23

## 2020-03-23 LAB — INR BLD: 2.2

## 2020-04-06 LAB — INR BLD: 2.1

## 2020-04-07 ENCOUNTER — ANTI-COAG VISIT (OUTPATIENT)
Dept: CARDIOLOGY CLINIC | Age: 75
End: 2020-04-07

## 2020-04-20 ENCOUNTER — ANTI-COAG VISIT (OUTPATIENT)
Dept: CARDIOLOGY CLINIC | Age: 75
End: 2020-04-20

## 2020-04-20 LAB — INR BLD: 2.4

## 2020-05-04 ENCOUNTER — ANTI-COAG VISIT (OUTPATIENT)
Dept: CARDIOLOGY CLINIC | Age: 75
End: 2020-05-04
Payer: MEDICARE

## 2020-05-04 LAB — INR BLD: 2.1

## 2020-05-04 PROCEDURE — 93793 ANTICOAG MGMT PT WARFARIN: CPT | Performed by: INTERNAL MEDICINE

## 2020-05-05 LAB
ALBUMIN SERPL-MCNC: 4.3 G/DL
ALP BLD-CCNC: 77 U/L
ALT SERPL-CCNC: 31 U/L
ANION GAP SERPL CALCULATED.3IONS-SCNC: 7 MMOL/L
AST SERPL-CCNC: 22 U/L
BILIRUB SERPL-MCNC: 0.82 MG/DL (ref 0.1–1.4)
BUN BLDV-MCNC: 28 MG/DL
CALCIUM SERPL-MCNC: 9.1 MG/DL
CHLORIDE BLD-SCNC: 105 MMOL/L
CO2: 30.2 MMOL/L
CREAT SERPL-MCNC: 1 MG/DL
GFR CALCULATED: NORMAL
GLUCOSE BLD-MCNC: 91 MG/DL
POTASSIUM SERPL-SCNC: 4.5 MMOL/L
SODIUM BLD-SCNC: 142 MMOL/L
TOTAL PROTEIN: 7.3

## 2020-05-07 LAB
CHOLESTEROL, TOTAL: 203 MG/DL
CHOLESTEROL/HDL RATIO: NORMAL
HDLC SERPL-MCNC: 58 MG/DL (ref 35–70)
LDL CHOLESTEROL CALCULATED: 117 MG/DL (ref 0–160)
MAGNESIUM: 2.4 MG/DL
TRIGL SERPL-MCNC: 140 MG/DL
VLDLC SERPL CALC-MCNC: 28 MG/DL

## 2020-05-12 PROBLEM — Z95.2 S/P AVR: Chronic | Status: RESOLVED | Noted: 2018-01-22 | Resolved: 2020-05-12

## 2020-05-12 NOTE — PROGRESS NOTES
concentric left ventricular hypertrophy. Grade II diastolic dysfunction with elevated left ventricular filling pressure. Moderate bi-atrial enlargement. A bioprosthetic aortic valve appears well seated with normal doppler parameters for valve. Mild to moderate tricuspid regurgitation. Systolic pulmonary artery pressure (SPAP) is estimated at 46 mmHg consistent   with mild pulmonary hypertension (Right atrial pressure of 3 mmHg). GXT: 1/19   Unable to assess LVEF and wall motion due to afib with rapid rates    Perfusion images show areas of reversibility in the inferolateral wall    consistent with ischemia of this territory.    This would be an intermediate to high risk study. Cath: 1/11/19   LM <10% LAD prox, mid-distal <10%, Lcx prox-mid 60-70%, RCA prox-mid 20-30% mid distal 50-60%. Moderate LCx/RCA disease correlating with stress test. Medical mgt without PCI recommended. Assessment:    1. AV Block  - S/p Dual chamber [employer] (2006)  - I reviewed device interrogation today. Device functioning normally.   ~ A-paced 52% V-paced <1%. Episodes of AT  ~ 6-9 years left on battery life expectancy  - Discussed with patient  - Follow up with device clinic as scheduled    2. Paroxysmal Atrial Fibrillation  - S/p RFCA of atrial flutter (12/12)  - S/p RFCA with PVI, additional ablation of CTI right atrial flutter (2/13/19)    - Currently in NSR  - Continue cardizem 180 mg QD, Toprol XL 50 mg QPM (consider increasing if burden worsens)   ~ Previously on amiodarone    - [id number] score: 3 (Age, HTN, CAD) ; [id number] Vasc score and anticoagulation discussed. High risk for stroke and thromboembolism. Anticoagulation is recommended. Risk of bleeding was discussed.  ~ On Coumadin; followed by anti-coagulation clinic    - Afib risk factors including age, HTN, obesity, inactivity and MARY were discussed with patient.  Risk factor modification recommended   ~ TSH 5.03 (3/19)      - Treatment options

## 2020-05-18 LAB — INR BLD: 2.5

## 2020-05-18 PROCEDURE — 93793 ANTICOAG MGMT PT WARFARIN: CPT | Performed by: INTERNAL MEDICINE

## 2020-05-19 ENCOUNTER — ANTI-COAG VISIT (OUTPATIENT)
Dept: CARDIOLOGY CLINIC | Age: 75
End: 2020-05-19
Payer: MEDICARE

## 2020-06-01 LAB — INR BLD: 2.1

## 2020-06-02 ENCOUNTER — ANTI-COAG VISIT (OUTPATIENT)
Dept: CARDIOLOGY CLINIC | Age: 75
End: 2020-06-02

## 2020-06-09 ENCOUNTER — OFFICE VISIT (OUTPATIENT)
Dept: CARDIOLOGY CLINIC | Age: 75
End: 2020-06-09
Payer: MEDICARE

## 2020-06-09 ENCOUNTER — NURSE ONLY (OUTPATIENT)
Dept: CARDIOLOGY CLINIC | Age: 75
End: 2020-06-09
Payer: MEDICARE

## 2020-06-09 VITALS
SYSTOLIC BLOOD PRESSURE: 116 MMHG | DIASTOLIC BLOOD PRESSURE: 71 MMHG | BODY MASS INDEX: 25.91 KG/M2 | HEART RATE: 62 BPM | HEIGHT: 70 IN | WEIGHT: 181 LBS

## 2020-06-09 PROBLEM — I48.91 ATRIAL FIBRILLATION WITH RVR (HCC): Status: RESOLVED | Noted: 2019-01-09 | Resolved: 2020-06-09

## 2020-06-09 PROCEDURE — 93000 ELECTROCARDIOGRAM COMPLETE: CPT | Performed by: NURSE PRACTITIONER

## 2020-06-09 PROCEDURE — 3017F COLORECTAL CA SCREEN DOC REV: CPT | Performed by: NURSE PRACTITIONER

## 2020-06-09 PROCEDURE — 1123F ACP DISCUSS/DSCN MKR DOCD: CPT | Performed by: NURSE PRACTITIONER

## 2020-06-09 PROCEDURE — 93280 PM DEVICE PROGR EVAL DUAL: CPT | Performed by: INTERNAL MEDICINE

## 2020-06-09 PROCEDURE — 99214 OFFICE O/P EST MOD 30 MIN: CPT | Performed by: NURSE PRACTITIONER

## 2020-06-09 PROCEDURE — G8417 CALC BMI ABV UP PARAM F/U: HCPCS | Performed by: NURSE PRACTITIONER

## 2020-06-09 PROCEDURE — 1036F TOBACCO NON-USER: CPT | Performed by: NURSE PRACTITIONER

## 2020-06-09 PROCEDURE — 4040F PNEUMOC VAC/ADMIN/RCVD: CPT | Performed by: NURSE PRACTITIONER

## 2020-06-09 PROCEDURE — G8427 DOCREV CUR MEDS BY ELIG CLIN: HCPCS | Performed by: NURSE PRACTITIONER

## 2020-06-15 LAB — INR BLD: 2

## 2020-06-16 ENCOUNTER — ANTI-COAG VISIT (OUTPATIENT)
Dept: CARDIOLOGY CLINIC | Age: 75
End: 2020-06-16
Payer: MEDICARE

## 2020-06-16 PROCEDURE — 93793 ANTICOAG MGMT PT WARFARIN: CPT | Performed by: INTERNAL MEDICINE

## 2020-06-17 NOTE — PROGRESS NOTES
Encounters:   06/18/20 179 lb (81.2 kg)   06/09/20 181 lb (82.1 kg)   12/12/19 186 lb 1.9 oz (84.4 kg)       Constitutional and General Appearance: NAD   Respiratory:  · Normal excursion and expansion without use of accessory muscles  · Resp Auscultation: Normal breath sounds without dullness  Cardiovascular:  · The apical impulses not displaced  · Heart tones are crisp and RRR  · Cervical veins are not engorged  · The carotid upstroke is normal in amplitude and contour without delay or bruit  · Normal S1S2, No S3, Soft 2/6 DIXIE  · Peripheral pulses are symmetrical and full  · There is no clubbing, cyanosis of the extremities. · No edema  · Femoral Arteries: 2+ and equal  · Pedal Pulses: 2+ and equal   Abdomen:  · No masses or tenderness  · Liver/Spleen: No Abnormalities Noted  Neurological/Psychiatric:  · Alert and oriented in all spheres  · Moves all extremities well  · Exhibits normal gait balance and coordination  · No abnormalities of mood, affect, memory, mentation, or behavior are noted  Skin:  · Skin: warm and dry; +macular rash lower back and trunk    Lab Results   Component Value Date    CHOL 203 05/07/2020    CHOL 179 05/06/2019    CHOL 268 03/29/2019     Lab Results   Component Value Date    TRIG 140 05/07/2020    TRIG 71 05/06/2019    TRIG 98 03/29/2019     Lab Results   Component Value Date    HDL 58 05/07/2020    HDL 75 05/06/2019    HDL 71 (A) 03/29/2019     Lab Results   Component Value Date    LDLCALC 90 (A) 05/06/19    LDLCALC 166 (A) 01/11/2018    LDLCALC 168 (H) 03/14/2011     Lab Results   Component Value Date    LABVLDL 30 03/14/2011    VLDL 28 05/07/2020    VLDL 14 05/06/2019    VLDL 20 03/29/2019     Assessment:     1. S/P AVR (aortic valve replacement):  S/P porcine AVR and VSD closure in 12/06. Most recent ECHO 1/9/19 EF 55% moderate concentric LVH. Grade II DD;  Moderate TAYA; normal bio. AVR; Mild to moderate TR, mild pulm HTN (no change from 12/15).       2. Paroxysmal atrial fibrillation

## 2020-06-18 ENCOUNTER — OFFICE VISIT (OUTPATIENT)
Dept: CARDIOLOGY CLINIC | Age: 75
End: 2020-06-18
Payer: MEDICARE

## 2020-06-18 VITALS
BODY MASS INDEX: 25.62 KG/M2 | HEART RATE: 70 BPM | WEIGHT: 179 LBS | SYSTOLIC BLOOD PRESSURE: 98 MMHG | DIASTOLIC BLOOD PRESSURE: 48 MMHG | HEIGHT: 70 IN | OXYGEN SATURATION: 98 % | TEMPERATURE: 98.4 F

## 2020-06-18 PROCEDURE — 4040F PNEUMOC VAC/ADMIN/RCVD: CPT | Performed by: INTERNAL MEDICINE

## 2020-06-18 PROCEDURE — 1036F TOBACCO NON-USER: CPT | Performed by: INTERNAL MEDICINE

## 2020-06-18 PROCEDURE — G8427 DOCREV CUR MEDS BY ELIG CLIN: HCPCS | Performed by: INTERNAL MEDICINE

## 2020-06-18 PROCEDURE — 3017F COLORECTAL CA SCREEN DOC REV: CPT | Performed by: INTERNAL MEDICINE

## 2020-06-18 PROCEDURE — G8417 CALC BMI ABV UP PARAM F/U: HCPCS | Performed by: INTERNAL MEDICINE

## 2020-06-18 PROCEDURE — 99214 OFFICE O/P EST MOD 30 MIN: CPT | Performed by: INTERNAL MEDICINE

## 2020-06-18 PROCEDURE — 1123F ACP DISCUSS/DSCN MKR DOCD: CPT | Performed by: INTERNAL MEDICINE

## 2020-06-18 RX ORDER — ROSUVASTATIN CALCIUM 10 MG/1
10 TABLET, COATED ORAL DAILY
Qty: 90 TABLET | Refills: 3 | Status: CANCELLED | OUTPATIENT
Start: 2020-06-18

## 2020-06-18 RX ORDER — DILTIAZEM HYDROCHLORIDE 180 MG/1
180 CAPSULE, COATED, EXTENDED RELEASE ORAL DAILY
Qty: 90 CAPSULE | Refills: 3 | Status: CANCELLED | OUTPATIENT
Start: 2020-06-18

## 2020-06-18 RX ORDER — FUROSEMIDE 40 MG/1
TABLET ORAL
Qty: 90 TABLET | Refills: 3 | Status: CANCELLED | OUTPATIENT
Start: 2020-06-18

## 2020-06-18 RX ORDER — WARFARIN SODIUM 2.5 MG/1
TABLET ORAL
Qty: 90 TABLET | Refills: 3 | Status: SHIPPED | OUTPATIENT
Start: 2020-06-18 | End: 2020-10-12 | Stop reason: SDUPTHER

## 2020-06-18 RX ORDER — METOPROLOL SUCCINATE 50 MG/1
50 TABLET, EXTENDED RELEASE ORAL NIGHTLY
Qty: 90 TABLET | Refills: 3 | Status: CANCELLED | OUTPATIENT
Start: 2020-06-18

## 2020-06-18 NOTE — LETTER
[id number]  [address] [address]  Phone: [phone]  Fax: [phone]     Janiya Chapman MD     6/23/2020     Makenna Henson. Dixie Griffin MD   [address] [address] [address]     Patient: Guy Arriaza   MR Number: [id number]   YOB: 1945   Date of Visit: 6/18/2020     Dear Dr. Makenna Henson. Dixie Griffin     Today I saw our mutual patient named above. Below are the relevant portions of my assessment and plan of care. If you have questions, please do not hesitate to call me. I look forward to following Charlotte Spear along with you. [address]   Cardiac Consultation    Referring Provider:  Makenna Henson. Dixie Griffin MD     Chief Complaint   Patient presents with    6 Month Follow-Up      Subjective:  Patient being seen today for routine cardiology follow up of HTN, HLD, PAF, PPM; c/o rash today    Past Medical History:   Mr Jr Long 68yo male former patient of my partner Dr Jarett Novoa who retired. Follows with Dr. Maria Teresa Salamanca for EP. He has PMH of porcine AVR and VSD closure in 02/62/71 complicated by post-op AV block and afib/flutter s/p . He had RFA for aflutter on 12/28/12 and again 2/13/19 by Dr. Maria Teresa Salamanca (on coumadin regulated by our office--has home machine). Prior took amiodarone but now d/c'd and hx chronically elevated LFT's on the medication. He was NOT on statin med per Dr. Jarett Novoa prior due to prior elevated LFT's on amiodarone but now takes crestor 10mg qd. His stress ECHO from 06/13/16 was negative for ischemia with an EF of 60%. Note hx elevated LFTS since 2011 which have been stable. Most recent ECHO 1/9/19 EF 55% moderate concentric LVH. Grade II DD;  Moderate TAYA; normal bio. AVR; Mild to moderate TR, mild pulm HTN (no change from 12/15). Most recent Stress test 1/9/19 with perfusion images show areas of reversibility in the inferolateral wall c/w ischemia.  Subsequently, underwent most recent [facility] 1/11/19 LM <10% LAD prox, mid-distal <10%, Lcx prox-mid 60-70%, RCA prox-mid 20-30% mid distal 50-60%. Moderate LCx/RCA disease correlating with stress test. Medical mgt without PCI recommended. Most recent Cardiac CTA 1/28/19 Mild-to-moderate coronary artery calcification. Most recent [facility] 2/13/19 EF 55% concentric LVH no evidence of mass or thrombus in the left atrium or appendage. Most recent EP study ablation 2/13/19 underwent EP study with RFA of atrial fibrillation and pulmonary vein isolation ablation. Most recent EKG 11/5/19 NSR; incomplete RBBB; LAFB. Dr. Stew Early stopped amiodarone 11/2019. Most recent device check 6/10/20 All sensing and pacing parameters within normal range. Battery life 9.5years. AP 52%.  <1%. Today he reports rash on arms and back . He followed up with dermatology in Feb 2020 who believes rash is from Crestor. Denies chest pain, shortness of breath, edema, dizziness, palpitations and syncope. Past Medical History:   has a past medical history of Arthritis, Atrial fibrillation ([address] CAD (coronary artery disease), Cardiac pacemaker, Hyperlipidemia, Hypertension, Mitral valve disease, and Thyroid disease. Surgical History:   has a past surgical history that includes Cardiac surgery (2009); cyst removal (1972); skin biopsy; Pacemaker insertion; Atrial ablation surgery (12-28-12); ventral hernia repair; Diagnostic Cardiac Cath Lab Procedure; and Atrial ablation surgery (11/2019). Social History:   , lives with spouse in [address] . He is retired , and current  of [address] reports that he has never smoked. He has never used smokeless tobacco. He reports that he does not drink alcohol or use drugs. Family History:  family history includes Cancer in his mother. Home Medications:  Prior to Admission medications    Medication Sig Start Date End Date Taking?  Authorizing Provider   diltiazem (CARDIZEM CD) 180 MG extended release capsule Take 1 capsule by or tingling. No change in gait, balance, coordination, mood, affect, memory, mentation, behavior. · Psychiatric: No anxiety, no depression. · Endocrine: No malaise, fatigue or temperature intolerance. No excessive thirst, fluid intake, or urination. No tremor. · Hematologic/Lymphatic: No abnormal bruising or bleeding, blood clots or swollen lymph nodes. · Allergic/Immunologic: No nasal congestion or hives. Physical Examination:    Vitals:    06/18/20 1309   BP: (!) 98/48   Pulse: 70   Temp: 98.4 °F (36.9 °C)   SpO2: 98%    Height: 5' 10\" (1.778 m), Weight: 179 lb (81.2 kg), BP: (!) 98/48        Wt Readings from Last 3 Encounters:   06/18/20 179 lb (81.2 kg)   06/09/20 181 lb (82.1 kg)   12/12/19 186 lb 1.9 oz (84.4 kg)       Constitutional and General Appearance: NAD   Respiratory:  · Normal excursion and expansion without use of accessory muscles  · Resp Auscultation: Normal breath sounds without dullness  Cardiovascular:  · The apical impulses not displaced  · Heart tones are crisp and RRR  · Cervical veins are not engorged  · The carotid upstroke is normal in amplitude and contour without delay or bruit  · Normal S1S2, No S3, Soft 2/6 DIXIE  · Peripheral pulses are symmetrical and full  · There is no clubbing, cyanosis of the extremities.   · No edema  · Femoral Arteries: 2+ and equal  · Pedal Pulses: 2+ and equal   Abdomen:  · No masses or tenderness  · Liver/Spleen: No Abnormalities Noted  Neurological/Psychiatric:  · Alert and oriented in all spheres  · Moves all extremities well  · Exhibits normal gait balance and coordination  · No abnormalities of mood, affect, memory, mentation, or behavior are noted  Skin:  · Skin: warm and dry; +macular rash lower back and trunk    Lab Results   Component Value Date    CHOL 203 05/07/2020    CHOL 179 05/06/2019    CHOL 268 03/29/2019     Lab Results   Component Value Date    TRIG 140 05/07/2020    TRIG 71 05/06/2019    TRIG 98 03/29/2019     Lab Results

## 2020-06-29 LAB — INR BLD: 2

## 2020-06-30 ENCOUNTER — ANTI-COAG VISIT (OUTPATIENT)
Dept: CARDIOLOGY CLINIC | Age: 75
End: 2020-06-30

## 2020-07-13 ENCOUNTER — ANTI-COAG VISIT (OUTPATIENT)
Dept: CARDIOLOGY CLINIC | Age: 75
End: 2020-07-13

## 2020-07-13 LAB — INR BLD: 1.8

## 2020-07-20 ENCOUNTER — ANTI-COAG VISIT (OUTPATIENT)
Dept: CARDIOLOGY CLINIC | Age: 75
End: 2020-07-20
Payer: MEDICARE

## 2020-07-20 LAB — INR BLD: 2.2

## 2020-07-20 PROCEDURE — 93793 ANTICOAG MGMT PT WARFARIN: CPT | Performed by: INTERNAL MEDICINE

## 2020-07-27 LAB
INR BLD: 1.9
INR BLD: 1.9

## 2020-07-28 ENCOUNTER — ANTI-COAG VISIT (OUTPATIENT)
Dept: CARDIOLOGY CLINIC | Age: 75
End: 2020-07-28
Payer: MEDICARE

## 2020-07-28 PROCEDURE — 93793 ANTICOAG MGMT PT WARFARIN: CPT | Performed by: INTERNAL MEDICINE

## 2020-07-28 NOTE — PROGRESS NOTES
Please refer to 'Notes' tab. Unchanged comment: This was sent to me in error. I have never seen this patient.

## 2020-07-30 ENCOUNTER — TELEPHONE (OUTPATIENT)
Dept: CARDIOLOGY CLINIC | Age: 75
End: 2020-07-30

## 2020-07-30 ENCOUNTER — ANTI-COAG VISIT (OUTPATIENT)
Dept: CARDIOLOGY CLINIC | Age: 75
End: 2020-07-30

## 2020-07-30 LAB — INR BLD: 2.4

## 2020-07-30 NOTE — TELEPHONE ENCOUNTER
Pt called stating he would like to speak with APRYL Donald regarding his 1.9 INR reading. He stated he was does not know what to do at this time to proceed. He wanted to inform her that he has been taking Amoxicillin medication and will be taking this until next Sunday, he is not sure if this is why his INR was low. Pt is hoping to hear back today. Please contact pt at [phone].

## 2020-08-05 ENCOUNTER — TELEPHONE (OUTPATIENT)
Dept: CARDIOLOGY CLINIC | Age: 75
End: 2020-08-05

## 2020-08-05 ENCOUNTER — ANTI-COAG VISIT (OUTPATIENT)
Dept: CARDIOLOGY CLINIC | Age: 75
End: 2020-08-05
Payer: MEDICARE

## 2020-08-05 DIAGNOSIS — I48.0 PAF (PAROXYSMAL ATRIAL FIBRILLATION) (HCC): ICD-10-CM

## 2020-08-05 LAB — INR BLD: 2.1

## 2020-08-05 PROCEDURE — 93793 ANTICOAG MGMT PT WARFARIN: CPT | Performed by: INTERNAL MEDICINE

## 2020-08-12 ENCOUNTER — TELEPHONE (OUTPATIENT)
Dept: CARDIOLOGY CLINIC | Age: 75
End: 2020-08-12

## 2020-08-12 ENCOUNTER — ANTI-COAG VISIT (OUTPATIENT)
Dept: CARDIOLOGY CLINIC | Age: 75
End: 2020-08-12
Payer: MEDICARE

## 2020-08-12 LAB — INR BLD: 2

## 2020-08-12 PROCEDURE — 93793 ANTICOAG MGMT PT WARFARIN: CPT | Performed by: INTERNAL MEDICINE

## 2020-08-26 ENCOUNTER — ANTI-COAG VISIT (OUTPATIENT)
Dept: CARDIOLOGY CLINIC | Age: 75
End: 2020-08-26
Payer: MEDICARE

## 2020-08-26 LAB — INR BLD: 2.1

## 2020-08-26 PROCEDURE — 93793 ANTICOAG MGMT PT WARFARIN: CPT | Performed by: INTERNAL MEDICINE

## 2020-08-30 ENCOUNTER — HOSPITAL ENCOUNTER (INPATIENT)
Age: 75
LOS: 5 days | Discharge: HOME | DRG: 389 | End: 2020-09-04
Attending: FAMILY MEDICINE | Admitting: FAMILY MEDICINE
Payer: MEDICARE

## 2020-08-30 PROBLEM — K56.601 COMPLETE INTESTINAL OBSTRUCTION (HCC): Status: ACTIVE | Noted: 2020-08-30

## 2020-08-30 PROBLEM — K56.609 SMALL BOWEL OBSTRUCTION (HCC): Status: ACTIVE | Noted: 2020-08-30

## 2020-08-30 LAB
ANION GAP SERPL CALCULATED.3IONS-SCNC: 10 MMOL/L (ref 3–16)
BUN BLDV-MCNC: 22 MG/DL (ref 7–20)
CALCIUM SERPL-MCNC: 9.2 MG/DL (ref 8.3–10.6)
CHLORIDE BLD-SCNC: 98 MMOL/L (ref 99–110)
CO2: 28 MMOL/L (ref 21–32)
CREAT SERPL-MCNC: 0.8 MG/DL (ref 0.8–1.3)
GFR AFRICAN AMERICAN: >60
GFR NON-AFRICAN AMERICAN: >60
GLUCOSE BLD-MCNC: 117 MG/DL (ref 70–99)
HCT VFR BLD CALC: 42.4 % (ref 40.5–52.5)
HEMOGLOBIN: 14.2 G/DL (ref 13.5–17.5)
MCH RBC QN AUTO: 32.1 PG (ref 26–34)
MCHC RBC AUTO-ENTMCNC: 33.3 G/DL (ref 31–36)
MCV RBC AUTO: 96.3 FL (ref 80–100)
PDW BLD-RTO: 14.5 % (ref 12.4–15.4)
PLATELET # BLD: 165 K/UL (ref 135–450)
PMV BLD AUTO: 7.8 FL (ref 5–10.5)
POTASSIUM REFLEX MAGNESIUM: 3.8 MMOL/L (ref 3.5–5.1)
RBC # BLD: 4.41 M/UL (ref 4.2–5.9)
SODIUM BLD-SCNC: 136 MMOL/L (ref 136–145)
TROPONIN: <0.01 NG/ML
WBC # BLD: 7.5 K/UL (ref 4–11)

## 2020-08-30 PROCEDURE — 85027 COMPLETE CBC AUTOMATED: CPT

## 2020-08-30 PROCEDURE — 6360000002 HC RX W HCPCS: Performed by: FAMILY MEDICINE

## 2020-08-30 PROCEDURE — 2500000003 HC RX 250 WO HCPCS: Performed by: FAMILY MEDICINE

## 2020-08-30 PROCEDURE — 36415 COLL VENOUS BLD VENIPUNCTURE: CPT

## 2020-08-30 PROCEDURE — 2060000000 HC ICU INTERMEDIATE R&B

## 2020-08-30 PROCEDURE — 84484 ASSAY OF TROPONIN QUANT: CPT

## 2020-08-30 PROCEDURE — 6370000000 HC RX 637 (ALT 250 FOR IP): Performed by: FAMILY MEDICINE

## 2020-08-30 PROCEDURE — 80048 BASIC METABOLIC PNL TOTAL CA: CPT

## 2020-08-30 PROCEDURE — 2580000003 HC RX 258: Performed by: FAMILY MEDICINE

## 2020-08-30 RX ORDER — SODIUM CHLORIDE 0.9 % (FLUSH) 0.9 %
10 SYRINGE (ML) INJECTION EVERY 12 HOURS SCHEDULED
Status: DISCONTINUED | OUTPATIENT
Start: 2020-08-30 | End: 2020-09-04 | Stop reason: HOSPADM

## 2020-08-30 RX ORDER — ONDANSETRON 2 MG/ML
4 INJECTION INTRAMUSCULAR; INTRAVENOUS EVERY 6 HOURS PRN
Status: DISCONTINUED | OUTPATIENT
Start: 2020-08-30 | End: 2020-09-04 | Stop reason: HOSPADM

## 2020-08-30 RX ORDER — ACETAMINOPHEN 325 MG/1
650 TABLET ORAL EVERY 6 HOURS PRN
Status: DISCONTINUED | OUTPATIENT
Start: 2020-08-30 | End: 2020-09-04 | Stop reason: HOSPADM

## 2020-08-30 RX ORDER — METOPROLOL TARTRATE 5 MG/5ML
5 INJECTION INTRAVENOUS EVERY 8 HOURS
Status: DISCONTINUED | OUTPATIENT
Start: 2020-08-30 | End: 2020-09-03

## 2020-08-30 RX ORDER — SODIUM CHLORIDE 0.9 % (FLUSH) 0.9 %
10 SYRINGE (ML) INJECTION PRN
Status: DISCONTINUED | OUTPATIENT
Start: 2020-08-30 | End: 2020-09-04 | Stop reason: HOSPADM

## 2020-08-30 RX ORDER — DEXTROSE, SODIUM CHLORIDE, AND POTASSIUM CHLORIDE 5; .45; .15 G/100ML; G/100ML; G/100ML
INJECTION INTRAVENOUS CONTINUOUS
Status: DISCONTINUED | OUTPATIENT
Start: 2020-08-30 | End: 2020-09-03

## 2020-08-30 RX ORDER — ACETAMINOPHEN 650 MG/1
650 SUPPOSITORY RECTAL EVERY 6 HOURS PRN
Status: DISCONTINUED | OUTPATIENT
Start: 2020-08-30 | End: 2020-09-04 | Stop reason: HOSPADM

## 2020-08-30 RX ORDER — PROMETHAZINE HYDROCHLORIDE 25 MG/1
12.5 TABLET ORAL EVERY 6 HOURS PRN
Status: DISCONTINUED | OUTPATIENT
Start: 2020-08-30 | End: 2020-09-04 | Stop reason: HOSPADM

## 2020-08-30 RX ADMIN — METOPROLOL TARTRATE 5 MG: 5 INJECTION INTRAVENOUS at 19:38

## 2020-08-30 RX ADMIN — POTASSIUM CHLORIDE, DEXTROSE MONOHYDRATE AND SODIUM CHLORIDE: 150; 5; 450 INJECTION, SOLUTION INTRAVENOUS at 19:38

## 2020-08-30 RX ADMIN — ENOXAPARIN SODIUM 40 MG: 40 INJECTION SUBCUTANEOUS at 20:50

## 2020-08-30 RX ADMIN — ACETAMINOPHEN 650 MG: 325 TABLET, FILM COATED ORAL at 20:58

## 2020-08-30 RX ADMIN — Medication 10 ML: at 19:45

## 2020-08-30 ASSESSMENT — PAIN DESCRIPTION - FREQUENCY: FREQUENCY: CONTINUOUS

## 2020-08-30 ASSESSMENT — PAIN DESCRIPTION - LOCATION: LOCATION: ABDOMEN

## 2020-08-30 ASSESSMENT — PAIN SCALES - GENERAL
PAINLEVEL_OUTOF10: 3
PAINLEVEL_OUTOF10: 3
PAINLEVEL_OUTOF10: 9

## 2020-08-30 ASSESSMENT — PAIN DESCRIPTION - PAIN TYPE: TYPE: ACUTE PAIN

## 2020-08-31 ENCOUNTER — NURSE ONLY (OUTPATIENT)
Dept: CARDIOLOGY CLINIC | Age: 75
End: 2020-08-31
Payer: MEDICARE

## 2020-08-31 ENCOUNTER — APPOINTMENT (OUTPATIENT)
Dept: GENERAL RADIOLOGY | Age: 75
DRG: 389 | End: 2020-08-31
Attending: FAMILY MEDICINE
Payer: MEDICARE

## 2020-08-31 LAB
ALBUMIN SERPL-MCNC: 4.4 G/DL (ref 3.4–5)
ALP BLD-CCNC: 81 U/L (ref 40–129)
ALT SERPL-CCNC: 14 U/L (ref 10–40)
ANION GAP SERPL CALCULATED.3IONS-SCNC: 9 MMOL/L (ref 3–16)
AST SERPL-CCNC: 24 U/L (ref 15–37)
BASOPHILS ABSOLUTE: 0 K/UL (ref 0–0.2)
BASOPHILS RELATIVE PERCENT: 0.5 %
BILIRUB SERPL-MCNC: 0.9 MG/DL (ref 0–1)
BILIRUBIN DIRECT: <0.2 MG/DL (ref 0–0.3)
BILIRUBIN, INDIRECT: NORMAL MG/DL (ref 0–1)
BUN BLDV-MCNC: 22 MG/DL (ref 7–20)
CALCIUM SERPL-MCNC: 9 MG/DL (ref 8.3–10.6)
CHLORIDE BLD-SCNC: 103 MMOL/L (ref 99–110)
CO2: 27 MMOL/L (ref 21–32)
CREAT SERPL-MCNC: 0.9 MG/DL (ref 0.8–1.3)
EOSINOPHILS ABSOLUTE: 0.1 K/UL (ref 0–0.6)
EOSINOPHILS RELATIVE PERCENT: 0.9 %
GFR AFRICAN AMERICAN: >60
GFR NON-AFRICAN AMERICAN: >60
GLUCOSE BLD-MCNC: 112 MG/DL (ref 70–99)
HCT VFR BLD CALC: 41.5 % (ref 40.5–52.5)
HEMOGLOBIN: 13.9 G/DL (ref 13.5–17.5)
INR BLD: 1.83 (ref 0.86–1.14)
LYMPHOCYTES ABSOLUTE: 0.6 K/UL (ref 1–5.1)
LYMPHOCYTES RELATIVE PERCENT: 9.1 %
MCH RBC QN AUTO: 32 PG (ref 26–34)
MCHC RBC AUTO-ENTMCNC: 33.4 G/DL (ref 31–36)
MCV RBC AUTO: 95.7 FL (ref 80–100)
MONOCYTES ABSOLUTE: 0.8 K/UL (ref 0–1.3)
MONOCYTES RELATIVE PERCENT: 12.4 %
NEUTROPHILS ABSOLUTE: 4.8 K/UL (ref 1.7–7.7)
NEUTROPHILS RELATIVE PERCENT: 77.1 %
PDW BLD-RTO: 14.4 % (ref 12.4–15.4)
PLATELET # BLD: 166 K/UL (ref 135–450)
PMV BLD AUTO: 8.3 FL (ref 5–10.5)
POTASSIUM REFLEX MAGNESIUM: 3.9 MMOL/L (ref 3.5–5.1)
PROTHROMBIN TIME: 21.3 SEC (ref 10–13.2)
RBC # BLD: 4.34 M/UL (ref 4.2–5.9)
SODIUM BLD-SCNC: 139 MMOL/L (ref 136–145)
TOTAL PROTEIN: 6.8 G/DL (ref 6.4–8.2)
TROPONIN: <0.01 NG/ML
WBC # BLD: 6.3 K/UL (ref 4–11)

## 2020-08-31 PROCEDURE — 84484 ASSAY OF TROPONIN QUANT: CPT

## 2020-08-31 PROCEDURE — 36415 COLL VENOUS BLD VENIPUNCTURE: CPT

## 2020-08-31 PROCEDURE — 74018 RADEX ABDOMEN 1 VIEW: CPT

## 2020-08-31 PROCEDURE — 99221 1ST HOSP IP/OBS SF/LOW 40: CPT | Performed by: INTERNAL MEDICINE

## 2020-08-31 PROCEDURE — 2500000003 HC RX 250 WO HCPCS: Performed by: FAMILY MEDICINE

## 2020-08-31 PROCEDURE — 93294 REM INTERROG EVL PM/LDLS PM: CPT | Performed by: INTERNAL MEDICINE

## 2020-08-31 PROCEDURE — 93296 REM INTERROG EVL PM/IDS: CPT | Performed by: INTERNAL MEDICINE

## 2020-08-31 PROCEDURE — 2580000003 HC RX 258: Performed by: FAMILY MEDICINE

## 2020-08-31 PROCEDURE — 85025 COMPLETE CBC W/AUTO DIFF WBC: CPT

## 2020-08-31 PROCEDURE — 99222 1ST HOSP IP/OBS MODERATE 55: CPT | Performed by: SURGERY

## 2020-08-31 PROCEDURE — 85610 PROTHROMBIN TIME: CPT

## 2020-08-31 PROCEDURE — 80048 BASIC METABOLIC PNL TOTAL CA: CPT

## 2020-08-31 PROCEDURE — 80076 HEPATIC FUNCTION PANEL: CPT

## 2020-08-31 PROCEDURE — 2060000000 HC ICU INTERMEDIATE R&B

## 2020-08-31 PROCEDURE — 0D9670Z DRAINAGE OF STOMACH WITH DRAINAGE DEVICE, VIA NATURAL OR ARTIFICIAL OPENING: ICD-10-PCS | Performed by: INTERNAL MEDICINE

## 2020-08-31 PROCEDURE — 6360000002 HC RX W HCPCS: Performed by: FAMILY MEDICINE

## 2020-08-31 RX ORDER — TAMSULOSIN HYDROCHLORIDE 0.4 MG/1
0.4 CAPSULE ORAL DAILY
Status: DISCONTINUED | OUTPATIENT
Start: 2020-08-31 | End: 2020-09-04 | Stop reason: HOSPADM

## 2020-08-31 RX ORDER — FINASTERIDE 5 MG/1
5 TABLET, FILM COATED ORAL DAILY
Status: DISCONTINUED | OUTPATIENT
Start: 2020-08-31 | End: 2020-09-04 | Stop reason: HOSPADM

## 2020-08-31 RX ADMIN — Medication 10 ML: at 20:19

## 2020-08-31 RX ADMIN — METOPROLOL TARTRATE 5 MG: 5 INJECTION INTRAVENOUS at 02:46

## 2020-08-31 RX ADMIN — METOPROLOL TARTRATE 5 MG: 5 INJECTION INTRAVENOUS at 11:52

## 2020-08-31 RX ADMIN — ENOXAPARIN SODIUM 40 MG: 40 INJECTION SUBCUTANEOUS at 09:21

## 2020-08-31 RX ADMIN — Medication 10 ML: at 09:22

## 2020-08-31 RX ADMIN — Medication 10 ML: at 11:54

## 2020-08-31 RX ADMIN — METOPROLOL TARTRATE 5 MG: 5 INJECTION INTRAVENOUS at 20:19

## 2020-08-31 RX ADMIN — POTASSIUM CHLORIDE, DEXTROSE MONOHYDRATE AND SODIUM CHLORIDE: 150; 5; 450 INJECTION, SOLUTION INTRAVENOUS at 16:03

## 2020-08-31 ASSESSMENT — PAIN SCALES - GENERAL
PAINLEVEL_OUTOF10: 0
PAINLEVEL_OUTOF10: 2
PAINLEVEL_OUTOF10: 7

## 2020-08-31 NOTE — LETTER
[address] [facility] [phone]  [facility]  Ninoska Giles [address] [facility]    Pacemaker/Defibrillator Clinic          08/31/20        Brigette Forrest  [address] [address]        Dear Brigette Forrest    This letter is to inform you that we received the transmission from your monitor at home that checks your implanted heart device. The next date your monitor will automatically transmit will be 2-10-21. If your report needs attention we will notify you. Your device and monitor are wireless and most transmit cellularly, but please periodically check your monitor is still plugged in to the electrical outlet. If you still use the telephone land line to send please ensure the connection to the phone vaishali is secure. This will help to ensure successful automatic transmissions in the future. Also, the monitor needs to be close to you while sleeping at night. Please be aware that the remote device transmission sites are periodically monitored only during regular business hours during which simultaneous in-office device clinics are being run. If your transmission requires attention, we will contact you as soon as possible. Thank you.             [facility]

## 2020-08-31 NOTE — CONSULTS
Department of General Surgery Consult    PATIENT NAME: James Giron OF BIRTH: 1945    ADMISSION DATE: 8/30/2020  6:27 PM      TODAY'S DATE: 8/31/2020    Reason for Consult: Small bowel obstruction    Chief Complaint: Abdominal pain    Requesting Physician: Johana Burroughs    HISTORY OF PRESENT ILLNESS:              The patient is a 76 y.o. male who presents with abdominal pain. He was sent from [facility].  He states for the past couple days he has had lower abdominal pain that is crampy in nature. It is worse with movement. He has been nauseated and has not been eating much. His bowels did work this morning. He denies fever or chills. He has never had pain like this before. His only surgical history is an upper midline ventral hernia repair.     Past Medical History:        Diagnosis Date    Arthritis     Atrial fibrillation ([address]     CAD (coronary artery disease)     Cardiac pacemaker     Hyperlipidemia     Hypertension     Mitral valve disease     Thyroid disease        Past Surgical History:        Procedure Laterality Date    ATRIAL ABLATION SURGERY  12-28-12    ablation of IVC-Tricuspid Valve    ATRIAL ABLATION SURGERY  11/2019    CARDIAC SURGERY  2009    AVR pig    CYST REMOVAL  1972    DIAGNOSTIC CARDIAC CATH LAB PROCEDURE      PACEMAKER INSERTION      A-fib/flutter    SKIN BIOPSY      VENTRAL HERNIA REPAIR         Current Medications:   Current Facility-Administered Medications: phenol 1.4 % mouth spray 1 spray, 1 spray, Mouth/Throat, Q2H PRN  tamsulosin (FLOMAX) capsule 0.4 mg, 0.4 mg, Oral, Daily  [START ON 9/1/2020] levothyroxine (SYNTHROID) tablet 75 mcg, 75 mcg, Oral, Daily  finasteride (PROSCAR) tablet 5 mg, 5 mg, Oral, Daily  sodium chloride flush 0.9 % injection 10 mL, 10 mL, Intravenous, 2 times per day  sodium chloride flush 0.9 % injection 10 mL, 10 mL, Intravenous, PRN  acetaminophen (TYLENOL) tablet 650 mg, 650 mg, Oral, Q6H PRN **OR** acetaminophen (TYLENOL) suppository 650 mg, 650 mg, Rectal, Q6H PRN  promethazine (PHENERGAN) tablet 12.5 mg, 12.5 mg, Oral, Q6H PRN **OR** ondansetron (ZOFRAN) injection 4 mg, 4 mg, Intravenous, Q6H PRN  enoxaparin (LOVENOX) injection 40 mg, 40 mg, Subcutaneous, Daily  dextrose 5 % and 0.45 % NaCl with KCl 20 mEq infusion, , Intravenous, Continuous  metoprolol (LOPRESSOR) injection 5 mg, 5 mg, Intravenous, Q8H  Prior to Admission medications    Medication Sig Start Date End Date Taking? Authorizing Provider   warfarin (COUMADIN) 2.5 MG tablet One tab by mouth daily or as directed 6/18/20  Yes Koko Almodovar MD   diltiazem [facility] CD) 180 MG extended release capsule Take 1 capsule by mouth daily 1/28/20  Yes Koko Almodovar MD   metoprolol succinate (TOPROL XL) 50 MG extended release tablet Take 1 tablet by mouth nightly 12/12/19  Yes Koko Almodovar MD   furosemide (LASIX) 40 MG tablet Take 1 tablet by mouth  daily 12/12/19  Yes Koko Almodovar MD   aspirin EC 81 MG EC tablet Take 1 tablet by mouth daily 12/6/18  Yes Koko Almodovar MD   finasteride (PROSCAR) 5 MG tablet Take 1 tablet by mouth nightly. 7/18/14  Yes Westley Arzola MD   Multiple Vitamins-Minerals (MULTIVITAMIN PO) Take 1 tablet by mouth daily. Yes Historical Provider, MD   levothyroxine (LEVOTHROID) 75 MCG tablet Take 75 mcg by mouth daily. Yes Historical Provider, MD   tamsulosin (FLOMAX) 0.4 MG capsule Take 0.4 mg by mouth daily. Yes Historical Provider, MD        Allergies:  Digoxin    Social History:   TOBACCO:   reports that he has never smoked. He has never used smokeless tobacco.  ETOH:   reports no history of alcohol use. DRUGS:   reports no history of drug use.       Family History:        Problem Relation Age of Onset    Cancer Mother        REVIEW OF SYSTEMS:  CONSTITUTIONAL:  negative  HEENT:  negative  RESPIRATORY:  negative  CARDIOVASCULAR:  negative  GASTROINTESTINAL:  negative except for nausea and abdominal pain  GENITOURINARY:  negative  HEMATOLOGIC/LYMPHATIC:  negative  NEUROLOGICAL:  Negative  * All other ROS reviewed and negative. PHYSICAL EXAM:  VITALS:  /64   Pulse 60   Temp 97.7 °F (36.5 °C) (Oral)   Resp 16   Ht 5' 10\" (1.778 m)   Wt 180 lb 12.8 oz (82 kg)   SpO2 92%   BMI 25.94 kg/m²   24HR INTAKE/OUTPUT:    I/O last 3 completed shifts: In: 820 [I.V.:820]  Out: 200 [Urine:200]  No intake/output data recorded. CONSTITUTIONAL:  alert, no apparent distress and normal weight  EYES:  PERRL, sclera clear  ENT:  Normocephalic,atraumatic, without obvious abnormality  NECK:  supple, symmetrical, trachea midline  LUNGS: Resp effort easy and unlabored, clear to auscultation  CARDIOVASCULAR:  NO JVD, regular rate and rhythm and no murmur noted  ABDOMEN:  normal bowel sounds, soft, distended, tenderness noted across the lower abdomen, voluntary guarding absent, no masses palpated and hernia absent  MUSCULOSKELETAL: No clubbing or cyanosis, 0+ pitting edema lower extremities  NEUROLOGIC:  Mental Status Exam:  Level of Alertness:   awake  PSYCHIATRIC:   person, place, time  SKIN:  no bruising or bleeding, normal skin color, texture, turgor and no redness, warmth, or swelling    DATA:    CBC:   Recent Labs     08/30/20 1926 08/31/20 0528   WBC 7.5 6.3   HGB 14.2 13.9   HCT 42.4 41.5    166     BMP:    Recent Labs     08/30/20 1926 08/31/20 0528    139   K 3.8 3.9   CL 98* 103   CO2 28 27   BUN 22* 22*   CREATININE 0.8 0.9   GLUCOSE 117* 112*     Hepatic:   Recent Labs     08/31/20 0528   AST 24   ALT 14   BILITOT 0.9   ALKPHOS 81     Mag:    No results for input(s): MG in the last 72 hours. Phos:   No results for input(s): PHOS in the last 72 hours. INR:   Recent Labs     08/31/20 0528   INR 1.83*       Radiology Review: Images personally reviewed by me.    CT images reviewed from disc and show dilated loops of small bowel with an apparent transition zone in the left lower quadrant      IMPRESSION/RECOMMENDATIONS:    Small bowel obstruction. Plan to continue IV fluid hydration and bowel rest.  Nasogastric tube to be placed to low continuous wall suction for decompression of the small bowel. Parenteral narcotics as needed. Check abdominal x-ray in the morning for follow-up of the small bowel obstruction.     Electronically signed by Wanda Ann MD     [address] [facility]  [id number]

## 2020-08-31 NOTE — CONSULTS
[address]                                  CONSULTATION    PATIENT NAME: Michael Floyd                     :        1945  MED REC NO:   [id number]                          ROOM:       [id number]  ACCOUNT NO:   [de-identified]                           ADMIT DATE: 2020  PROVIDER:     Morales Mckinnon MD    CONSULT DATE:  2020    CARDIAC ELECTROPHYSIOLOGY CONSULTATION    REASON FOR CONSULTATION:  Atrial fibrillation. HISTORY OF PRESENT ILLNESS:  The patient is a 70-year-old man with a  history of porcine aortic valve replacement and VSD repair in  who  is admitted with nausea and abdominal pain. The patient has a long  history of atrial arrhythmias having undergone catheter ablation for  atrial flutter of . He had recurring paroxysmal atrial fibrillation  with AF burdens as high as 40% on amiodarone. He underwent pulmonary  vein isolation in 2019. On his most recent device evaluation from  2020, the atrial fib burden is down to 7% without amiodarone. He  was evaluated for nausea and abdominal pain at [facility] where an abdominal CT was consistent with small bowel  obstruction. He was then transferred to [facility] for management  of same. The patient is currently afebrile. He has persistent colicky  midabdominal pain. He was in atrial fibrillation at the time of  admission which converted spontaneously to sinus rhythm at 06:40 p.m. on  2020. PAST MEDICAL HISTORY:  1. Paroxysmal atrial fibrillation. 2.  Paroxysmal atrial flutter. 3.  Status post porcine aortic valve replacement. 4.  Status post VSD repair. 5.  Status post pacemaker implantation.     MEDICATIONS:  At the time of admission include aspirin 81 mg p.o. daily,  diltiazem 180 mg p.o. daily, Lasix 40 mg p.o. daily, Synthroid 75 mcg  p.o. daily, Toprol-XL 50 mg p.o. daily, Flomax 0.4 mg p.o. daily, and  Coumadin as directed. ALLERGIES:  DIGOXIN. SOCIAL HISTORY:  The patient does not smoke cigarette. He does not  consume alcohol at this time. FAMILY HISTORY:  Remarkable for cancer in the mother. There is no known  family history of cardiac rhythm disturbance, syncope, or sudden cardiac  death. REVIEW OF SYSTEMS:  Demonstrates no recent change in appetite or change  in weight. The patient has not had fever or chills. He has not had  chest pain. He does describe some intermittent palpitations. All other  components of the 14-system review are negative. PHYSICAL EXAMINATION:  VITAL SIGNS:  Blood pressure is 102/64, heart rate is 60 beats per  minute and regular. The patient is afebrile. GENERAL:  He is awake, alert, oriented. He has some intermittent  abdominal discomfort. HEENT:  Exam demonstrates normocephalic, atraumatic head. There is no  scleral icterus. Pupils are round and reactive. NECK:  Supple without thyromegaly. LUNGS:  Clear to auscultation. CARDIOVASCULAR:  Exam reveals a regular rhythm. The apical impulse is  discrete. S1, S2 are normal.  There is no audible murmur or gallop. The jugular venous pressure is not elevated. ABDOMEN:  Tense, distended, hypoactive bowel sounds. EXTREMITIES:  Demonstrate no pitting pretibial dependent edema. There  are some dermatologic changes, which may be consistent with chronic  venous stasis. SKIN:  Otherwise, warm and dry without skin rash. DIAGNOSTIC DATA: Most recent ECG from 06/09/2020 demonstrates sinus  rhythm at 63 beats per minute. There is an incomplete right  bundle-branch block. IMPRESSION:  1. Paroxysmal atrial fibrillation. 2.  Possible small bowel obstruction. 3.  Sick sinus syndrome. 4.  Normal function of dual-chamber pacemaker. The patient is admitted with nausea, vomiting, and radiographic evidence  for small bowel obstruction.   At the time of admission, he was found to  have atrial fibrillation which spontaneously converted. His device  demonstrates 7% atrial fibrillation burden following a catheter ablation  for atrial fibrillation. He is currently n.p.o. for the small bowel  obstruction. He can be treated with IV metoprolol or diltiazem on a  p.r.n. basis for recurrences of atrial fibrillation. His warfarin has  been withheld in the event that he may require surgical intervention. His Lovenox dose can be increased to 1 mg/kg subcu q.12h. for stroke  prophylaxis. RECOMMENDATIONS:  1. NPO and hold medicines until surgical evaluation. 2.  Increase Lovenox to 1 mg/kg subcu q.12h.  3.  PRN IV diltiazem for recurrences of atrial fibrillation. Thanks for the opportunity to assist in the care of the patient. Please  contact me, if you have any questions regarding his evaluation.         Janeth Schroeder MD    D: 08/31/2020 12:32:21       T: 08/31/2020 12:35:52     [email or web address]  Job#: [id number]     Doc#: [id number]    CC:

## 2020-08-31 NOTE — PROGRESS NOTES
HGB 14.2 13.9   HCT 42.4 41.5    166     Recent Labs     08/30/20 1926 08/31/20  0528    139   K 3.8 3.9   CL 98* 103   CO2 28 27   BUN 22* 22*   CREATININE 0.8 0.9   CALCIUM 9.2 9.0     Recent Labs     08/31/20  0528   AST 24   ALT 14   BILIDIR <0.2   BILITOT 0.9   ALKPHOS 81     Recent Labs     08/31/20  0528   INR 1.83*     Recent Labs     08/30/20 1926 08/31/20  0038   TROPONINI <0.01 <0.01       Urinalysis:    No results found for: NITRU, WBCUA, BACTERIA, RBCUA, BLOODU, SPECGRAV, GLUCOSEU    Consults:    IP CONSULT TO GENERAL SURGERY  IP CONSULT TO CARDIOLOGY      Assessment/Plan:    Active Hospital Problems    Diagnosis    Small bowel obstruction ([facility] [address] [K29.510]    S/P AVR (aortic valve replacement) [Z95.2]    Paroxysmal atrial fibrillation ([facility] [address] [I48.0]    Hyperlipidemia [E78.5]    Hypertension [I10]         Small bowel obstruction - Hx hernia repair. Pt transferred from [facility] ED, CT abd done there showed SBO with transition point in LLQ   Pt is having abd discomfort, nausea, is not having emesis   Cont bowel rest , cont IVF   ED has discussed with gen surgery, f/u with recs from surgery      HTN/CAD - w/ known CAD but no evidence of active signs/sxs of ischemia/failure. Currently controlled on home meds w/ vitals reviewed and documented as above. Afib - w/ RVR POArrival, ofunspecified and clinically unable to determine etiology. Normally rate controlled on BBlocker/CCBlocker - held. Anticoagulated at baseline on home Coumadin - held. Follow INR, reviewed and documented as above. Actively adjusting Coumadin dosing as required. Monitored on tele. S/P Porcine AVR. S/P Pacer. Cardiology consulted and appreciated. HypoThyroid - clinically euthyroid on oral replacement therapy. Continue, w/ outpt monitoring as previously arranged.      BPH - w/out acute obstructive Uropathy, controlled on home medications as ordered - continued.         DVT Prophylaxis: LMWH

## 2020-08-31 NOTE — H&P
Hospital Medicine History & Physical      PCP: Faby Pike. Gustavo James MD    Date of Admission: 8/30/2020    Date of Service: Pt seen/examined on 8/30/2020  and Admitted to Inpatient with expected LOS greater than two midnights due to medical therapy. Chief Complaint:  abd pain , nausea       History Of Present Illness:  Pt is transferred form [facility]     76 y.o. male with PMH of hernia repair, a fib ,porcine AVR ,pacemaker ,CAD,  on AC with warfarin ,presents  With c/o abd pain , nausea since yesterday . Pt reports fairly sudden onset of abd cramping , upper abd pain since yesterday afternoon , cramping  Got worse last night ,   C/o upper abd pain , more like pressure, no particular radiation , 8/10   C/o nausea with lot belching   Denies any emesis   No flatus since yesterday   Denies any fever  Denies any chest pain , palpitations, dizziness       Past Medical History:          Diagnosis Date    Arthritis     Atrial fibrillation ([address]     CAD (coronary artery disease)     Cardiac pacemaker     Hyperlipidemia     Hypertension     Mitral valve disease     Thyroid disease        Past Surgical History:          Procedure Laterality Date    ATRIAL ABLATION SURGERY  12-28-12    ablation of IVC-Tricuspid Valve    ATRIAL ABLATION SURGERY  11/2019    CARDIAC SURGERY  2009    AVR pig    CYST REMOVAL  1972    DIAGNOSTIC CARDIAC CATH LAB PROCEDURE      PACEMAKER INSERTION      A-fib/flutter    SKIN BIOPSY      VENTRAL HERNIA REPAIR         Medications Prior to Admission:      Prior to Admission medications    Medication Sig Start Date End Date Taking?  Authorizing Provider   warfarin (COUMADIN) 2.5 MG tablet One tab by mouth daily or as directed 6/18/20  Yes Byron Fang MD   diltiazem [facility]) 180 MG extended release capsule Take 1 capsule by mouth daily 1/28/20  Yes Byron Fang MD   metoprolol succinate (TOPROL XL) 50 MG extended release tablet Take 1 tablet by mouth nightly rhythm with normal S1/S2 without murmurs, rubs or gallops. Abdomen: Soft, mild epigastric tenderness, no rebound or guarding , , non-distended , hyperactive  bowel sounds. Musculoskeletal:  No clubbing, cyanosis or edema bilaterally. Full range of motion without deformity. Skin: Skin color, texture, turgor normal.  No rashes or lesions. Neurologic:  Neurovascularly intact without any focal sensory/motor deficits. Cranial nerves: II-XII intact, grossly non-focal.  Psychiatric:  Alert and oriented, thought content appropriate, normal insight  Capillary Refill: Brisk,< 3 seconds   Peripheral Pulses: +2 palpable, equal bilaterally       Labs:     Recent Labs     08/30/20 1926   WBC 7.5   HGB 14.2   HCT 42.4        Recent Labs     08/30/20 1926      K 3.8   CL 98*   CO2 28   BUN 22*   CREATININE 0.8   CALCIUM 9.2     No results for input(s): AST, ALT, BILIDIR, BILITOT, ALKPHOS in the last 72 hours. No results for input(s): INR in the last 72 hours.   Recent Labs     08/30/20 1926   TROPONINI <0.01       Urinalysis:    No results found for: Eva Sprinkle, BACTERIA, RBCUA, BLOODU, [address]    Radiology:     CXR: I have reviewed the CXR with the following interpretation:   EKG:  I have reviewed the EKG with the following interpretation:     No orders to display       ASSESSMENT:    Active Hospital Problems    Diagnosis Date Noted    Small bowel obstruction [facility]) [K56.609] 08/30/2020         PLAN:    Small bowel obstruction   H/o hernia repair     Pt is transferred from [email or web address] ED, CT abd done there showed SBO with transition point in LLQ   Pt is having abd discomfort, nausea, is not having emesis   Cont bowel rest , cont IVF   ED has discussed with gen surgery, f/u with recs from surgery     A fib with RVR   CAD  Porcine AVR   Pacemaker in place   HTN     Pt was tachycardic in 160s in ED, hold PO home meds for now, cont IV lopressor with parameters   Pt was on AC with warfarin , INR was 1.87 , cont to hold warfarin   Re assess AC depending on surgery plans   Cardiology consulted     DVT Prophylaxis: *lovenox   Diet: Diet NPO Effective Now  Code Status: Full Code    PT/OT Eval Status:     Dispo -  3-4 days        Chris MD Sadia    Thank you Shukri Kelly MD for the opportunity to be involved in this patient's care. If you have any questions or concerns please feel free to contact me at [phone].

## 2020-08-31 NOTE — FLOWSHEET NOTE
08/31/20 0845   Vital Signs   Temp 98 °F (36.7 °C)   Temp Source Oral   Pulse 60   Heart Rate Source Monitor   Resp 16   /71   BP Location Right upper arm   Level of Consciousness 0   MEWS Score 1   Patient Currently in Pain Yes   Pain Assessment   Pain Assessment 0-10   Pain Level 2   Oxygen Therapy   SpO2 95 %   O2 Device None (Room air)

## 2020-08-31 NOTE — PROGRESS NOTES
Pt's wife is concerned as to why pt. Has not received any home meds or CT scan as they were told the pt would be getting one this AM.      1100- Called Gen. Surgery and notified them of what is going on and waiting for call back. Also Dr. Darshan Duval is aware.

## 2020-08-31 NOTE — PROGRESS NOTES
We received remote transmission from patient's monitor at home. Transmission shows normal sensing and pacing function. Noted AT, AF and NSVT. Pt is on cardizem and coumadin. EP physician will review. See interrogation under cardiology tab in the [address] [place] [address] [address] field for more details.

## 2020-09-01 ENCOUNTER — APPOINTMENT (OUTPATIENT)
Dept: GENERAL RADIOLOGY | Age: 75
DRG: 389 | End: 2020-09-01
Attending: FAMILY MEDICINE
Payer: MEDICARE

## 2020-09-01 LAB
ALBUMIN SERPL-MCNC: 4.3 G/DL (ref 3.4–5)
ANION GAP SERPL CALCULATED.3IONS-SCNC: 12 MMOL/L (ref 3–16)
BUN BLDV-MCNC: 13 MG/DL (ref 7–20)
CALCIUM SERPL-MCNC: 8.9 MG/DL (ref 8.3–10.6)
CHLORIDE BLD-SCNC: 103 MMOL/L (ref 99–110)
CO2: 23 MMOL/L (ref 21–32)
CREAT SERPL-MCNC: 0.8 MG/DL (ref 0.8–1.3)
GFR AFRICAN AMERICAN: >60
GFR NON-AFRICAN AMERICAN: >60
GLUCOSE BLD-MCNC: 105 MG/DL (ref 70–99)
HCT VFR BLD CALC: 43.3 % (ref 40.5–52.5)
HEMOGLOBIN: 14.5 G/DL (ref 13.5–17.5)
INR BLD: 2.05 (ref 0.86–1.14)
MCH RBC QN AUTO: 32.1 PG (ref 26–34)
MCHC RBC AUTO-ENTMCNC: 33.4 G/DL (ref 31–36)
MCV RBC AUTO: 96 FL (ref 80–100)
PDW BLD-RTO: 14.1 % (ref 12.4–15.4)
PHOSPHORUS: 2 MG/DL (ref 2.5–4.9)
PLATELET # BLD: 161 K/UL (ref 135–450)
PMV BLD AUTO: 8.1 FL (ref 5–10.5)
POTASSIUM SERPL-SCNC: 4.2 MMOL/L (ref 3.5–5.1)
PROTHROMBIN TIME: 23.9 SEC (ref 10–13.2)
RBC # BLD: 4.51 M/UL (ref 4.2–5.9)
SODIUM BLD-SCNC: 138 MMOL/L (ref 136–145)
WBC # BLD: 4.5 K/UL (ref 4–11)

## 2020-09-01 PROCEDURE — 99232 SBSQ HOSP IP/OBS MODERATE 35: CPT | Performed by: SURGERY

## 2020-09-01 PROCEDURE — 6360000002 HC RX W HCPCS: Performed by: INTERNAL MEDICINE

## 2020-09-01 PROCEDURE — 2500000003 HC RX 250 WO HCPCS: Performed by: FAMILY MEDICINE

## 2020-09-01 PROCEDURE — 80069 RENAL FUNCTION PANEL: CPT

## 2020-09-01 PROCEDURE — 6370000000 HC RX 637 (ALT 250 FOR IP)

## 2020-09-01 PROCEDURE — 6360000002 HC RX W HCPCS: Performed by: FAMILY MEDICINE

## 2020-09-01 PROCEDURE — 99233 SBSQ HOSP IP/OBS HIGH 50: CPT | Performed by: NURSE PRACTITIONER

## 2020-09-01 PROCEDURE — 85027 COMPLETE CBC AUTOMATED: CPT

## 2020-09-01 PROCEDURE — 2580000003 HC RX 258: Performed by: FAMILY MEDICINE

## 2020-09-01 PROCEDURE — 85610 PROTHROMBIN TIME: CPT

## 2020-09-01 PROCEDURE — 2060000000 HC ICU INTERMEDIATE R&B

## 2020-09-01 PROCEDURE — 74019 RADEX ABDOMEN 2 VIEWS: CPT

## 2020-09-01 RX ADMIN — POTASSIUM CHLORIDE, DEXTROSE MONOHYDRATE AND SODIUM CHLORIDE: 150; 5; 450 INJECTION, SOLUTION INTRAVENOUS at 23:08

## 2020-09-01 RX ADMIN — METOPROLOL TARTRATE 5 MG: 5 INJECTION INTRAVENOUS at 11:39

## 2020-09-01 RX ADMIN — ENOXAPARIN SODIUM 40 MG: 40 INJECTION SUBCUTANEOUS at 10:03

## 2020-09-01 RX ADMIN — ENOXAPARIN SODIUM 80 MG: 80 INJECTION SUBCUTANEOUS at 20:24

## 2020-09-01 RX ADMIN — Medication: at 20:25

## 2020-09-01 RX ADMIN — Medication 10 ML: at 09:00

## 2020-09-01 RX ADMIN — METOPROLOL TARTRATE 5 MG: 5 INJECTION INTRAVENOUS at 20:19

## 2020-09-01 RX ADMIN — POTASSIUM CHLORIDE, DEXTROSE MONOHYDRATE AND SODIUM CHLORIDE: 150; 5; 450 INJECTION, SOLUTION INTRAVENOUS at 13:18

## 2020-09-01 ASSESSMENT — PAIN SCALES - GENERAL
PAINLEVEL_OUTOF10: 0

## 2020-09-01 NOTE — CARE COORDINATION
CASE MANAGEMENT INITIAL ASSESSMENT      Reviewed chart and completed assessment with: patient   Explained Case Management role/services. Primary contact information: [address]em[address] Painter [phone]    Admit date/status: 8/30/20  Diagnosis: small bowel obstruction   Is this a Readmission?: no    Insurance: Medicare, [facility]   Precert required for SNF -  N        3 night stay required - Y    Living arrangements, Adls, care needs, prior to admission: lives in a house with his wife    Transportation: patient     Durable Medical Equipment at home: none    Services in the home and/or outpatient, prior to admission: none    Dialysis Facility (if applicable)   · Name:  · Address:  · Dialysis Schedule:  · Phone:  · Fax:    PT/OT recs: none    Hospital Exemption Notification (HEN): not initiated     Barriers to discharge: none    Plan/comments: Patient is independent at home. He denies any DME or services at home. Likely no needs from CM. Please notify CM should any needs arise.

## 2020-09-01 NOTE — PROGRESS NOTES
Hospitalist Progress Note      PCP: Odette Hurst. Clint Santiago MD    Date of Admission: 8/30/2020    Chief Complaint: Abdominal Pain    Subjective: no new c/o. Medications:  Reviewed    Infusion Medications    dextrose 5% and 0.45% NaCl with KCl 20 mEq 100 mL/hr at 08/31/20 1603     Scheduled Medications    tamsulosin  0.4 mg Oral Daily    levothyroxine  75 mcg Oral Daily    finasteride  5 mg Oral Daily    sodium chloride flush  10 mL Intravenous 2 times per day    enoxaparin  40 mg Subcutaneous Daily    metoprolol  5 mg Intravenous Q8H     PRN Meds: phenol, sodium chloride flush, acetaminophen **OR** acetaminophen, promethazine **OR** ondansetron      Intake/Output Summary (Last 24 hours) at 9/1/2020 0926  Last data filed at 9/1/2020 3922  Gross per 24 hour   Intake 810 ml   Output 1200 ml   Net -390 ml       Physical Exam Performed:    BP (!) 144/76   Pulse 68   Temp 97.8 °F (36.6 °C) (Oral)   Resp 16   Ht 5' 10\" (1.778 m)   Wt 180 lb 12.8 oz (82 kg)   SpO2 96%   BMI 25.94 kg/m²     General appearance: No apparent distress, appears stated age and cooperative. HEENT: Pupils equal, round, and reactive to light. Conjunctivae/corneas clear. NGT in place  Neck: Supple, with full range of motion. No jugular venous distention. Trachea midline. Respiratory:  Normal respiratory effort. Clear to auscultation, bilaterally without Rales/Wheezes/Rhonchi. Cardiovascular: Regular rate and rhythm with normal S1/S2 without murmurs, rubs or gallops. Abdomen: Soft, non-tender, non-distended with normal bowel sounds. Musculoskeletal: No clubbing, cyanosis or edema bilaterally. Full range of motion without deformity. Skin: Skin color, texture, turgor normal.  No rashes or lesions. Neurologic:  Neurovascularly intact without any focal sensory/motor deficits.  Cranial nerves: II-XII intact, grossly non-focal.  Psychiatric: Alert and oriented, thought content appropriate, normal insight  Capillary Refill: Brisk,< 3 seconds   Peripheral Pulses: +2 palpable, equal bilaterally       Labs:   Recent Labs     08/30/20 1926 08/31/20 0528 09/01/20  0548   WBC 7.5 6.3 4.5   HGB 14.2 13.9 14.5   HCT 42.4 41.5 43.3    166 161     Recent Labs     08/30/20 1926 08/31/20  0528 09/01/20  0548    139 138   K 3.8 3.9 4.2   CL 98* 103 103   CO2 28 27 23   BUN 22* 22* 13   CREATININE 0.8 0.9 0.8   CALCIUM 9.2 9.0 8.9   PHOS  --   --  2.0*     Recent Labs     08/31/20  0528   AST 24   ALT 14   BILIDIR <0.2   BILITOT 0.9   ALKPHOS 81     Recent Labs     08/31/20 0528 09/01/20  0548   INR 1.83* 2.05*     Recent Labs     08/30/20 1926 08/31/20  0038   TROPONINI <0.01 <0.01       Urinalysis:    No results found for: Rosa Abide, WBCUA, BACTERIA, RBCUA, BLOODU, SPECGRAV, GLUCOSEU    Consults:    IP CONSULT TO GENERAL SURGERY  IP CONSULT TO CARDIOLOGY      Assessment/Plan:    Active Hospital Problems    Diagnosis    Small bowel obstruction ([facility] [address] [Y27.023]    S/P AVR (aortic valve replacement) [Z95.2]    Paroxysmal atrial fibrillation ([facility] [address] [I48.0]    Hyperlipidemia [E78.5]    Hypertension [I10]         Small bowel obstruction - w/ hx hernia repair. Pt transferred from [facility] ED. CT abdomen done there showed SBO with transition point in LLQ. General Surgery consulted and appreciated. NG Tube placed 31 August w/ subjective improvement. Continue bowel rest w/ IVF      HTN/CAD - w/ known CAD but no evidence of active signs/sxs of ischemia/failure. Currently controlled on home meds w/ vitals reviewed and documented as above. Afib - w/ RVR POArrival, of unspecified and clinically unable to determine etiology. Normally rate controlled on BBlocker/CCBlocker - held due to NPO status. Anticoagulated at baseline on home Coumadin - held and placed on Tx dose LMWH. Follow INR, reviewed and documented as above. Actively adjusting Coumadin dosing as required. Monitored on tele. S/P Porcine AVR. S/P Pacer.   Cardiology consulted and appreciated. HypoThyroid - clinically euthyroid on oral replacement therapy. Continue, w/ outpt monitoring as previously arranged. BPH - w/out acute obstructive Uropathy, controlled on home medications as ordered - continued.         DVT Prophylaxis: LMWH   Diet: Diet NPO Effective Now  Code Status: Full Code      PT/OT Eval Status: not yet ordered. Dispo - Likely Wed/Thurs 2/3 Sept at the earliest pending clinical course.      Rene Delong MD

## 2020-09-01 NOTE — PROGRESS NOTES
[facility]    PATIENT NAME: Jb Bryan     TODAY'S DATE: 9/1/2020    CHIEF COMPLAINT: abd pain    INTERVAL HISTORY/HPI:    Pt with minimal pain, no nausea, having flatus, no fevers. REVIEW OF SYSTEMS:  Pertinent positives and negatives as per interval history section    OBJECTIVE:  VITALS:  /81   Pulse 72   Temp 97.8 °F (36.6 °C) (Oral)   Resp 18   Ht 5' 10\" (1.778 m)   Wt 180 lb 12.8 oz (82 kg)   SpO2 95%   BMI 25.94 kg/m²     INTAKE/OUTPUT:    I/O last 3 completed shifts: In: 820 [I.V.:820]  Out: 1200 [Urine:600; Emesis/NG output:600]  I/O this shift:  In: -   Out: 900 [Urine:900]    CONSTITUTIONAL:  awake and alert  LUNGS:  Respirations easy and unlabored, no crackles or wheezing  CARD:  regular rate and rhythm  ABDOMEN:  hypoactive bowel sounds, soft, non-distended, minimal tender     Data:  CBC:   Recent Labs     08/30/20 1926 08/31/20 0528 09/01/20  0548   WBC 7.5 6.3 4.5   HGB 14.2 13.9 14.5   HCT 42.4 41.5 43.3    166 161     BMP:    Recent Labs     08/30/20 1926 08/31/20 0528 09/01/20  0548    139 138   K 3.8 3.9 4.2   CL 98* 103 103   CO2 28 27 23   BUN 22* 22* 13   CREATININE 0.8 0.9 0.8   GLUCOSE 117* 112* 105*     Hepatic:   Recent Labs     08/31/20 0528   AST 24   ALT 14   BILITOT 0.9   ALKPHOS 81     Mag:    No results for input(s): MG in the last 72 hours. Phos:     Recent Labs     09/01/20 0548   PHOS 2.0*      INR:   Recent Labs     08/31/20 0528 09/01/20 0548   INR 1.83* 2.05*       Radiology Review:  *Imaging personally reviewed by me. AXR - Persistent small bowel dilatation, in keeping with patient's history of small   bowel obstruction. ASSESSMENT AND PLAN:  77 yo with sbo  1. Persistent obstructive pattern on imaging but improved clinically  2. Continue NG to LCWS  3.   If not improved tomorrow then will get SBFT     Electronically signed by Taryn Garcia, [address]

## 2020-09-01 NOTE — FLOWSHEET NOTE
09/01/20 0745   Vital Signs   Temp 97.8 °F (36.6 °C)   Temp Source Oral   Pulse 68   Heart Rate Source Monitor   Resp 16   BP (!) 144/76   BP Location Right upper arm   Level of Consciousness 0   MEWS Score 1   Patient Currently in Pain No   Pain Assessment   Pain Assessment 0-10   Pain Level 0   Oxygen Therapy   SpO2 96 %   O2 Device None (Room air)

## 2020-09-01 NOTE — PROGRESS NOTES
[address]     Electrophysiology                                     Progress Note    Admission date:  2020    Reason for follow up visit: PAF    HPI/CC: Brian Padilla was admitted on 2020 from [facility] with abdominal pain and has been treated for SBO. EP following for PAF (spontaneously converted to sinus ). Rhythm has been sinus. Subjective: He complains of abd tenderness but denies chest pain, palpitations, shortness of breath, and dizziness. Vitals:  Blood pressure (!) 144/76, pulse 68, temperature 97.8 °F (36.6 °C), temperature source Oral, resp. rate 16, height 5' 10\" (1.778 m), weight 180 lb 12.8 oz (82 kg), SpO2 96 %.   Temp  Av.8 °F (36.6 °C)  Min: 97.7 °F (36.5 °C)  Max: 97.9 °F (36.6 °C)  Pulse  Av  Min: 60  Max: 68  BP  Min: 102/64  Max: 144/76  SpO2  Av %  Min: 92 %  Max: 97 %    24 hour I/O    Intake/Output Summary (Last 24 hours) at 2020 1116  Last data filed at 2020 0470  Gross per 24 hour   Intake 810 ml   Output 2100 ml   Net -1290 ml     Current Facility-Administered Medications   Medication Dose Route Frequency Provider Last Rate Last Dose    enoxaparin (LOVENOX) injection 80 mg  1 mg/kg Subcutaneous BID Néstor Henry MD        phenol 1.4 % mouth spray 1 spray  1 spray Mouth/Throat Q2H PRN Karyle Bridgeman, MD        tamsulosin (FLOMAX) capsule 0.4 mg  0.4 mg Oral Daily Néstor Henry MD        levothyroxine (SYNTHROID) tablet 75 mcg  75 mcg Oral Daily Néstor Henry MD   Stopped at 20 0617    finasteride (PROSCAR) tablet 5 mg  5 mg Oral Daily Néstor Henry MD        sodium chloride flush 0.9 % injection 10 mL  10 mL Intravenous 2 times per day Wandy Cuevas MD   10 mL at 20 0900    sodium chloride flush 0.9 % injection 10 mL  10 mL Intravenous PRN Wandy Cuevas MD   10 mL at 20 1154    acetaminophen (TYLENOL) tablet 650 mg  650 mg Oral Q6H PRN Wandy Cuevas MD   650 mg at 20 2058    Or    acetaminophen (TYLENOL) suppository 650 mg  650 mg Rectal Q6H PRN Henri Bustos MD        promethazine (PHENERGAN) tablet 12.5 mg  12.5 mg Oral Q6H PRN Henri Bustos MD        Or    ondansetron (ZOFRAN) injection 4 mg  4 mg Intravenous Q6H PRN Henri Bustos MD        dextrose 5 % and 0.45 % NaCl with KCl 20 mEq infusion   Intravenous Continuous Henri Bustos  mL/hr at 08/31/20 1603      metoprolol (LOPRESSOR) injection 5 mg  5 mg Intravenous Sujata Valadez MD   Stopped at 09/01/20 0317       Objective:     Telemetry monitor: sinus     Physical Exam:  Constitutional and general appearance: alert, cooperative, no distress and appears stated age  HEENT: PERRL, no cervical lymphadenopathy. No masses palpable. Normal oral mucosa  Respiratory:  · Normal excursion and expansion without use of accessory muscles  · Resp auscultation: Normal breath sounds without wheezing, rhonchi, and rales  Cardiovascular:  · The apical impulse is not displaced  · Heart tones are crisp and normal. regular S1 and S2.  · Jugular venous pulsation Normal  · The carotid upstroke is normal in amplitude and contour without delay or bruit  · Peripheral pulses are symmetrical and full   Abdomen:  · No masses; + tenderness  · Bowel sounds absent   Extremities:  ·  No cyanosis or clubbing  ·  No lower extremity edema but + evidence of PVD  ·  Skin: warm and dry  Neurological:  · Alert and oriented  · Moves all extremities well  · No abnormalities of mood, affect, memory, mentation, or behavior are noted    Data  Echo 1/10/2019:   Left ventricular systolic function is normal with ejection fraction estimated at 55%. No regional wall motion abnormalities. There is moderate concentric left ventricular hypertrophy. Grade II diastolic dysfunction with elevated left ventricular filling pressure. Moderate bi-atrial enlargement.    A bioprosthetic aortic valve appears well seated with normal doppler 09/01/2020    PROTIME 21.3 08/31/2020    PROTIME 20.6 12/12/2019    PROTIME 30.5 05/28/2013    PROTIME 40.7 05/07/2013    PROTIME 25.7 04/02/2013     PTT No results found for: PTT   Lab Results   Component Value Date    MG 2.4 05/07/2020      Lab Results   Component Value Date    TSH 5.030 03/29/2019       Assessment:  Paroxysmal atrial arrhythmias: stable   -recurrent this admission (spontaneous conversion to sinus)   -s/p RFCA of atrial flutter 2012   -s/p RFCA for afib and typical atrial flutter 2/2019   -amiodarone stopped 11/2019 per Dr. Pearl Brambila    -[id number] score 3 (age, HTN, vascular disease)  AV block: stable   -s/p dual chamber pacemaker implant 2006 ([employer])  HTN: controlled   CAD: moderate   -s/p LHC 1/2019  Aortic stenosis: s/p porcine AVR and VSD closure 2006  Hypothyroidism: on Synthroid   SBO    Plan:   1. Continue therapeutic Lovenox and restart Coumadin when taking po   2. Continue IV metoprolol  3.  Start IV Cardizem for recurrent atrial arrhythmias while NPO  4.  Surgery following for SBO    SALVADOR Pina-CNP  [employer]  [phone]

## 2020-09-02 ENCOUNTER — APPOINTMENT (OUTPATIENT)
Dept: GENERAL RADIOLOGY | Age: 75
DRG: 389 | End: 2020-09-02
Attending: FAMILY MEDICINE
Payer: MEDICARE

## 2020-09-02 LAB
ALBUMIN SERPL-MCNC: 4.2 G/DL (ref 3.4–5)
ANION GAP SERPL CALCULATED.3IONS-SCNC: 12 MMOL/L (ref 3–16)
BUN BLDV-MCNC: 9 MG/DL (ref 7–20)
CALCIUM SERPL-MCNC: 8.8 MG/DL (ref 8.3–10.6)
CHLORIDE BLD-SCNC: 102 MMOL/L (ref 99–110)
CO2: 25 MMOL/L (ref 21–32)
CREAT SERPL-MCNC: 0.8 MG/DL (ref 0.8–1.3)
GFR AFRICAN AMERICAN: >60
GFR NON-AFRICAN AMERICAN: >60
GLUCOSE BLD-MCNC: 95 MG/DL (ref 70–99)
HCT VFR BLD CALC: 40.3 % (ref 40.5–52.5)
HEMOGLOBIN: 13.5 G/DL (ref 13.5–17.5)
INR BLD: 1.94 (ref 0.86–1.14)
MAGNESIUM: 2.5 MG/DL (ref 1.8–2.4)
MCH RBC QN AUTO: 31.9 PG (ref 26–34)
MCHC RBC AUTO-ENTMCNC: 33.5 G/DL (ref 31–36)
MCV RBC AUTO: 95.4 FL (ref 80–100)
PDW BLD-RTO: 14.6 % (ref 12.4–15.4)
PHOSPHORUS: 2.5 MG/DL (ref 2.5–4.9)
PLATELET # BLD: 150 K/UL (ref 135–450)
PMV BLD AUTO: 8 FL (ref 5–10.5)
POTASSIUM SERPL-SCNC: 4.6 MMOL/L (ref 3.5–5.1)
PROTHROMBIN TIME: 22.6 SEC (ref 10–13.2)
RBC # BLD: 4.23 M/UL (ref 4.2–5.9)
SODIUM BLD-SCNC: 139 MMOL/L (ref 136–145)
WBC # BLD: 4.9 K/UL (ref 4–11)

## 2020-09-02 PROCEDURE — 2500000003 HC RX 250 WO HCPCS: Performed by: FAMILY MEDICINE

## 2020-09-02 PROCEDURE — 74022 RADEX COMPL AQT ABD SERIES: CPT

## 2020-09-02 PROCEDURE — 2060000000 HC ICU INTERMEDIATE R&B

## 2020-09-02 PROCEDURE — 99233 SBSQ HOSP IP/OBS HIGH 50: CPT | Performed by: NURSE PRACTITIONER

## 2020-09-02 PROCEDURE — 85027 COMPLETE CBC AUTOMATED: CPT

## 2020-09-02 PROCEDURE — 74250 X-RAY XM SM INT 1CNTRST STD: CPT

## 2020-09-02 PROCEDURE — 99232 SBSQ HOSP IP/OBS MODERATE 35: CPT | Performed by: SURGERY

## 2020-09-02 PROCEDURE — APPSS45 APP SPLIT SHARED TIME 31-45 MINUTES: Performed by: CLINICAL NURSE SPECIALIST

## 2020-09-02 PROCEDURE — 6360000002 HC RX W HCPCS: Performed by: INTERNAL MEDICINE

## 2020-09-02 PROCEDURE — 36415 COLL VENOUS BLD VENIPUNCTURE: CPT

## 2020-09-02 PROCEDURE — 80069 RENAL FUNCTION PANEL: CPT

## 2020-09-02 PROCEDURE — 85610 PROTHROMBIN TIME: CPT

## 2020-09-02 PROCEDURE — 2580000003 HC RX 258: Performed by: FAMILY MEDICINE

## 2020-09-02 PROCEDURE — 83735 ASSAY OF MAGNESIUM: CPT

## 2020-09-02 RX ADMIN — METOPROLOL TARTRATE 5 MG: 5 INJECTION INTRAVENOUS at 04:05

## 2020-09-02 RX ADMIN — Medication 10 ML: at 21:37

## 2020-09-02 RX ADMIN — METOPROLOL TARTRATE 5 MG: 5 INJECTION INTRAVENOUS at 13:09

## 2020-09-02 RX ADMIN — POTASSIUM CHLORIDE, DEXTROSE MONOHYDRATE AND SODIUM CHLORIDE: 150; 5; 450 INJECTION, SOLUTION INTRAVENOUS at 08:18

## 2020-09-02 RX ADMIN — ENOXAPARIN SODIUM 80 MG: 80 INJECTION SUBCUTANEOUS at 21:37

## 2020-09-02 RX ADMIN — METOPROLOL TARTRATE 5 MG: 5 INJECTION INTRAVENOUS at 21:37

## 2020-09-02 RX ADMIN — ENOXAPARIN SODIUM 80 MG: 80 INJECTION SUBCUTANEOUS at 08:18

## 2020-09-02 RX ADMIN — POTASSIUM CHLORIDE, DEXTROSE MONOHYDRATE AND SODIUM CHLORIDE: 150; 5; 450 INJECTION, SOLUTION INTRAVENOUS at 18:55

## 2020-09-02 ASSESSMENT — PAIN SCALES - GENERAL
PAINLEVEL_OUTOF10: 0

## 2020-09-02 NOTE — PLAN OF CARE
Problem: Pain:  Goal: Pain level will decrease  Description: Pain level will decrease  Outcome: Ongoing  Note: Patient educated on verbalizing complaints of pain utilizing the pain scale, pharmacological and non pharmacological pain control methods utilized. Will continue to monitor. Goal: Control of acute pain  Description: Control of acute pain  9/2/2020 1052 by Makenna Ivy RN  Outcome: Ongoing  9/1/2020 2358 by Bob Kidd RN  Outcome: Ongoing  Goal: Control of chronic pain  Description: Control of chronic pain  Outcome: Ongoing     Problem: Falls - Risk of:  Goal: Will remain free from falls  Description: Will remain free from falls  Outcome: Ongoing  Note: Patient educated on fall risk precautions. Patient bed in low position, bed alarm in place, call light and bedside table within reach. No falls reported this shift.     Goal: Absence of physical injury  Description: Absence of physical injury  Outcome: Ongoing

## 2020-09-02 NOTE — FLOWSHEET NOTE
09/02/20 1533   Assessment   Charting Type Reassessment   Neurological   Neuro (WDL) WDL   Level of Consciousness 0   Hanna Coma Scale   Eye Opening 4   Best Verbal Response 5   Best Motor Response 6   Hanna Coma Scale Score 15   HEENT   HEENT (WDL) X   Right Eye Impaired vision; Intact   Left Eye Impaired vision; Intact   Respiratory   Respiratory (WDL) WDL   Cardiac   Cardiac (WDL) X  (Admitted with a. fib/RVR)   Cardiac Regularity Regular   Heart Sounds S1, S2   Cardiac Rhythm NSR   Cardiac Monitor   Telemetry Monitor On Yes   Telemetry Audible Yes   Telemetry Alarms Set Yes   Gastrointestinal   Abdominal (WDL) X  (sbo)   GI Symptoms Bloating   Abdomen Inspection Distended; Taut   Tenderness Soft;Tenderness   RUQ Bowel Sounds Hypoactive   LUQ Bowel Sounds Hypoactive   RLQ Bowel Sounds Hypoactive   LLQ Bowel Sounds Hypoactive   Peripheral Vascular   Peripheral Vascular (WDL) WDL   Edema None   Skin Color/Condition   Skin Color/Condition (WDL) WDL   Skin Integrity   Skin Integrity (WDL) WDL   Musculoskeletal   Musculoskeletal (WDL) WDL   Genitourinary   Genitourinary (WDL) WDL   Anus/Rectum   Anus/Rectum (WDL) WDL   Psychosocial   Psychosocial (WDL) WDL

## 2020-09-02 NOTE — PROGRESS NOTES
[facility] SURGERY    PATIENT NAME: Franklin Chan     TODAY'S DATE: 9/2/2020    CHIEF COMPLAINT: abd pain    INTERVAL HISTORY/HPI:    Pt doing well, min pain. Passing flatus and had small loose BM after SBFT today. REVIEW OF SYSTEMS:  Pertinent positives and negatives as per interval history section    OBJECTIVE:  VITALS:  BP (!) 165/90   Pulse 75   Temp 97.9 °F (36.6 °C) (Oral)   Resp 18   Ht 5' 10\" (1.778 m)   Wt 180 lb 12.8 oz (82 kg)   SpO2 92%   BMI 25.94 kg/m²     INTAKE/OUTPUT:    I/O last 3 completed shifts: In: 800 [I.V.:800]  Out: 2265 [Urine:1815; Emesis/NG output:450]  I/O this shift:  In: 3922 [I.V.:3752]  Out: 500 [Urine:300; Emesis/NG output:200]    CONSTITUTIONAL:  awake and alert  LUNGS:  Respirations easy and unlabored, no crackles or wheezing  CARD:  regular rate and rhythm  ABDOMEN:  + bowel sounds, soft, non-distended, minimal tender     Data:  CBC:   Recent Labs     08/31/20 0528 09/01/20  0548 09/02/20  0643   WBC 6.3 4.5 4.9   HGB 13.9 14.5 13.5   HCT 41.5 43.3 40.3*    161 150     BMP:    Recent Labs     08/31/20 0528 09/01/20  0548 09/02/20  0643    138 139   K 3.9 4.2 4.6    103 102   CO2 27 23 25   BUN 22* 13 9   CREATININE 0.9 0.8 0.8   GLUCOSE 112* 105* 95     Hepatic:   Recent Labs     08/31/20  0528   AST 24   ALT 14   BILITOT 0.9   ALKPHOS 81     Mag:      Recent Labs     09/02/20  0643   MG 2.50*      Phos:     Recent Labs     09/01/20 0548 09/02/20  0643   PHOS 2.0* 2.5      INR:   Recent Labs     08/31/20 0528 09/01/20  0548 09/02/20  0643   INR 1.83* 2.05* 1.94*       Radiology Review:  *Imaging personally reviewed by me. EXAMINATION:   SMALL BOWEL FOLLOW THROUGH SERIES     9/2/2020     TECHNIQUE:   Small bowel follow through series was performed with overhead images and spot   images.      FLUOROSCOPY DOSE AND TYPE OR TIME AND EXPOSURES:   2 fluoroscopic images, 0.1 minutes fluoroscopy     COMPARISON:   None     HISTORY:   ORDERING SYSTEM PROVIDED HISTORY: sbo   TECHNOLOGIST PROVIDED HISTORY:   Reason for exam:->sbo     FINDINGS:   Nasogastric tube is present.  Upon administration of barium, there is prompt   visualization of the stomach.  Opacification of small bowel loops throughout   right and left hemiabdomen was subsequently noted.  Dilatation of small bowel   to approximately 4.5 cm within the left hemiabdomen.  Small bowel within the   right hemiabdomen is not dilated.  There is normal transit time to colon,   approximately 1.5 hours.  Spot view of the terminal ileum is grossly   unremarkable. Impression:      Multiple dilated loops of small bowel are seen within the left hemiabdomen   though small-bowel transit time is within normal limits.  The findings could   reflect resolving obstruction or localized ileus. ASSESSMENT AND PLAN:  75 yo with sbo  SBFT as above - improving. Remove ngt today and start clear liquids. Electronically signed by SALVADOR Gandhi CNP     [id number]    Patient seen and agree with above.   Having flatus and bm, no nausea, no fevers  D/c ng and start clear liquid diet    Raphael Palmer MD

## 2020-09-02 NOTE — PROGRESS NOTES
[facility]     Electrophysiology                                     Progress Note    Admission date:  2020    Reason for follow up visit: PAF    HPI/CC: Saba Em was admitted on 2020 from [facility] with abdominal pain and has been treated for SBO. EP following for PAF (spontaneously converted to sinus ). Rhythm has been sinus with brief PAT. Subjective: States abd pain is improving. Denies chest pain, palpitations, shortness of breath, and dizziness. Vitals:  Blood pressure (!) 165/90, pulse 75, temperature 97.9 °F (36.6 °C), temperature source Oral, resp. rate 18, height 5' 10\" (1.778 m), weight 180 lb 12.8 oz (82 kg), SpO2 92 %.   Temp  Av.1 °F (36.7 °C)  Min: 97.9 °F (36.6 °C)  Max: 98.3 °F (36.8 °C)  Pulse  Av.5  Min: 64  Max: 82  BP  Min: 133/74  Max: 165/90  SpO2  Av.6 %  Min: 92 %  Max: 97 %    24 hour I/O    Intake/Output Summary (Last 24 hours) at 2020 1345  Last data filed at 2020 1312  Gross per 24 hour   Intake 4552 ml   Output 1865 ml   Net 2687 ml     Current Facility-Administered Medications   Medication Dose Route Frequency Provider Last Rate Last Dose    enoxaparin (LOVENOX) injection 80 mg  1 mg/kg Subcutaneous BID Sabino Mcdonough MD   80 mg at 20 0818    phenol 1.4 % mouth spray 1 spray  1 spray Mouth/Throat Q2H PRN Bc Mauricio MD        tamsulosin (FLOMAX) capsule 0.4 mg  0.4 mg Oral Daily Sabino Mcdonough MD        levothyroxine (SYNTHROID) tablet 75 mcg  75 mcg Oral Daily Sabino Mcdonough MD   Stopped at 20 0617    finasteride (PROSCAR) tablet 5 mg  5 mg Oral Daily Sabino Mcdonough MD        sodium chloride flush 0.9 % injection 10 mL  10 mL Intravenous 2 times per day Asher Riggs MD   10 mL at 20 0900    sodium chloride flush 0.9 % injection 10 mL  10 mL Intravenous PRN Asher Riggs MD   10 mL at 20 1154    acetaminophen (TYLENOL) tablet 650 mg  650 mg Oral Q6H PRN Tejas Pastor Minerva MD   650 mg at 08/30/20 2058    Or    acetaminophen (TYLENOL) suppository 650 mg  650 mg Rectal Q6H PRN Joselito Handley MD        promethazine (PHENERGAN) tablet 12.5 mg  12.5 mg Oral Q6H PRN Joselito Handley MD        Or    ondansetron (ZOFRAN) injection 4 mg  4 mg Intravenous Q6H PRN Joselito Handley MD        dextrose 5 % and 0.45 % NaCl with KCl 20 mEq infusion   Intravenous Continuous Joselito Handley  mL/hr at 09/02/20 0818      metoprolol (LOPRESSOR) injection 5 mg  5 mg Intravenous Q8H Joselito Handley MD   5 mg at 09/02/20 1309       Objective:     Telemetry monitor: sinus     Physical Exam:  Constitutional and general appearance: alert, cooperative, no distress and appears stated age  HEENT: PERRL, no cervical lymphadenopathy. No masses palpable. Normal oral mucosa  Respiratory:  · Normal excursion and expansion without use of accessory muscles  · Resp auscultation: Normal breath sounds without wheezing, rhonchi, and rales  Cardiovascular:  · The apical impulse is not displaced  · Heart tones are crisp and normal. regular S1 and S2.  · Jugular venous pulsation Normal  · The carotid upstroke is normal in amplitude and contour without delay or bruit  · Peripheral pulses are symmetrical and full   Abdomen:  · No masses; + tenderness  · Bowel sounds absent   Extremities:  ·  No cyanosis or clubbing  ·  No lower extremity edema but + evidence of PVD  ·  Skin: warm and dry  Neurological:  · Alert and oriented  · Moves all extremities well  · No abnormalities of mood, affect, memory, mentation, or behavior are noted    Data  Echo 1/10/2019:   Left ventricular systolic function is normal with ejection fraction estimated at 55%. No regional wall motion abnormalities. There is moderate concentric left ventricular hypertrophy. Grade II diastolic dysfunction with elevated left ventricular filling pressure. Moderate bi-atrial enlargement.    A bioprosthetic aortic valve appears well seated with normal doppler parameters for valve. Mild to moderate tricuspid regurgitation. Systolic pulmonary artery pressure (SPAP) is estimated at 46 mmHg consistent with mild pulmonary hypertension (Right atrial pressure of 3 mmHg). [facility] 1/11/2019 Eulas Govern):  CORONARY ARTERIES     LM <10% stenosis. LAD Large vessel. Wraps around apex. Prox 10% stenosis. Mid-distal 10% stenosis. LCX Prox-mid 60-70% stenosis. RCA Dominant. Prox-mid 20-30% stenosis. Mid-distal 50-60% stenosis. Overall RCA is small to medium sized vessel.      CONCLUSIONS:      Moderate LCx/RCA disease correlating with stress test     I think medical management for this is reasonable for this and would incrs cardizem from 180mg daily to 240mg daily     Would additional consider additional tx for afib such as ablation, pt plans on f/u w/ dr Beth France for this     D/w pt/family that PCI can certainly be done for cad/ashd but he would have to go on triple anticoagulation and disease can initially be managed medically and would reserve PCI for angina that is referactory to medical theray    All labs and testing reviewed.   Lab Review     Renal Profile:   Lab Results   Component Value Date    CREATININE 0.8 09/02/2020    BUN 9 09/02/2020     09/02/2020    K 4.6 09/02/2020    K 3.9 08/31/2020     09/02/2020    CO2 25 09/02/2020     CBC:    Lab Results   Component Value Date    WBC 4.9 09/02/2020    RBC 4.23 09/02/2020    HGB 13.5 09/02/2020    HCT 40.3 09/02/2020    MCV 95.4 09/02/2020    RDW 14.6 09/02/2020     09/02/2020     BNP:  No results found for: BNP  Fasting Lipid Panel:    Lab Results   Component Value Date    CHOL 203 05/07/2020    HDL 58 05/07/2020    HDL 68 03/14/2011    TRIG 140 05/07/2020     Cardiac Enzymes:  CK/MbTroponin  Lab Results   Component Value Date    TROPONINI <0.01 08/31/2020     PT/ INR   Lab Results   Component Value Date    INR 1.94 09/02/2020    INR 2.05 09/01/2020    INR 1.83 08/31/2020 PROTIME 22.6 09/02/2020    PROTIME 23.9 09/01/2020    PROTIME 21.3 08/31/2020    PROTIME 30.5 05/28/2013    PROTIME 40.7 05/07/2013    PROTIME 25.7 04/02/2013     PTT No results found for: PTT   Lab Results   Component Value Date    MG 2.50 09/02/2020      Lab Results   Component Value Date    TSH 5.030 03/29/2019       Assessment:  Paroxysmal atrial arrhythmias: stable   -recurrent this admission (spontaneous conversion to sinus)   -s/p RFCA of atrial flutter 2012   -s/p RFCA for afib and typical atrial flutter 2/2019   -amiodarone stopped 11/2019 per Dr. Mary Garcia    -[id number] score 3 (age, HTN, vascular disease)  AV block: stable   -s/p dual chamber pacemaker implant 2006 ([employer])  HTN: controlled   CAD: moderate   -s/p LHC 1/2019  Aortic stenosis: s/p porcine AVR and VSD closure 2006  Hypothyroidism: on Synthroid   SBO    Plan:   1. Continue therapeutic Lovenox and restart Coumadin when taking po   2. Continue IV metoprolol  3.  Start IV Cardizem for recurrent atrial arrhythmias while NPO  4.  Surgery following for SBO    Brielle Villasenor, SALVADOR-CNP  [address]  [phone]

## 2020-09-02 NOTE — PROGRESS NOTES
Per [facility] pt had a 10 second run of PAT, HR up to 150's. Pt asymptomatic, VSS with HR bck in 60's.  Message sent to [facility] MD

## 2020-09-02 NOTE — PROGRESS NOTES
Hospitalist Progress Note      PCP: Nalini Jurado. Je Villalta MD    Date of Admission: 8/30/2020    Chief Complaint: Abdominal Pain    Subjective: no new c/o. Medications:  Reviewed    Infusion Medications    dextrose 5% and 0.45% NaCl with KCl 20 mEq 100 mL/hr at 09/02/20 0818     Scheduled Medications    enoxaparin  1 mg/kg Subcutaneous BID    tamsulosin  0.4 mg Oral Daily    levothyroxine  75 mcg Oral Daily    finasteride  5 mg Oral Daily    sodium chloride flush  10 mL Intravenous 2 times per day    metoprolol  5 mg Intravenous Q8H     PRN Meds: phenol, sodium chloride flush, acetaminophen **OR** acetaminophen, promethazine **OR** ondansetron      Intake/Output Summary (Last 24 hours) at 9/2/2020 0820  Last data filed at 9/2/2020 0558  Gross per 24 hour   Intake 800 ml   Output 2265 ml   Net -1465 ml       Physical Exam Performed:    /83   Pulse 64   Temp 98.1 °F (36.7 °C) (Oral)   Resp 16   Ht 5' 10\" (1.778 m)   Wt 180 lb 12.8 oz (82 kg)   SpO2 94%   BMI 25.94 kg/m²     General appearance: No apparent distress, appears stated age and cooperative. HEENT: Pupils equal, round, and reactive to light. Conjunctivae/corneas clear. NGT in place  Neck: Supple, with full range of motion. No jugular venous distention. Trachea midline. Respiratory:  Normal respiratory effort. Clear to auscultation, bilaterally without Rales/Wheezes/Rhonchi. Cardiovascular: Regular rate and rhythm with normal S1/S2 without murmurs, rubs or gallops. Abdomen: Soft, non-tender, non-distended with normal bowel sounds. Musculoskeletal: No clubbing, cyanosis or edema bilaterally. Full range of motion without deformity. Skin: Skin color, texture, turgor normal.  No rashes or lesions. Neurologic:  Neurovascularly intact without any focal sensory/motor deficits.  Cranial nerves: II-XII intact, grossly non-focal.  Psychiatric: Alert and oriented, thought content appropriate, normal insight  Capillary Refill: Brisk,< 3 seconds   Peripheral Pulses: +2 palpable, equal bilaterally       Labs:   Recent Labs     08/31/20  0528 09/01/20  0548 09/02/20  0643   WBC 6.3 4.5 4.9   HGB 13.9 14.5 13.5   HCT 41.5 43.3 40.3*    161 150     Recent Labs     08/31/20  0528 09/01/20  0548 09/02/20  0643    138 139   K 3.9 4.2 4.6    103 102   CO2 27 23 25   BUN 22* 13 9   CREATININE 0.9 0.8 0.8   CALCIUM 9.0 8.9 8.8   PHOS  --  2.0* 2.5     Recent Labs     08/31/20  0528   AST 24   ALT 14   BILIDIR <0.2   BILITOT 0.9   ALKPHOS 81     Recent Labs     08/31/20  0528 09/01/20  0548 09/02/20  0643   INR 1.83* 2.05* 1.94*     Recent Labs     08/30/20  1926 08/31/20  0038   TROPONINI <0.01 <0.01       Urinalysis:    No results found for: Yue , BACTERIA, RBCUA, BLOODU, SPECGRAV, GLUCOSEU    Consults:    IP CONSULT TO GENERAL SURGERY  IP CONSULT TO CARDIOLOGY      Assessment/Plan:    Active Hospital Problems    Diagnosis    Small bowel obstruction ([facility] [address] [U22.646]    S/P AVR (aortic valve replacement) [Z95.2]    Paroxysmal atrial fibrillation ([address] [I48.0]    Hyperlipidemia [E78.5]    Hypertension [I10]    Atrioventricular block, complete ([address] [I44.2]         Small bowel obstruction - w/ hx hernia repair. Pt transferred from [facility] ED. CT abdomen done there showed SBO with transition point in LLQ. General Surgery consulted and appreciated. NG Tube placed 31 August w/ subjective improvement. Continue bowel rest w/ IVF. SBFT in progress 2 Sept.     HTN/CAD - w/ known CAD but no evidence of active signs/sxs of ischemia/failure. Currently controlled on home meds w/ vitals reviewed and documented as above. Afib - w/ RVR POArrival, of unspecified and clinically unable to determine etiology. Normally rate controlled on BBlocker/CCBlocker - held due to NPO status. Anticoagulated at baseline on home Coumadin - held and placed on Tx dose LMWH. Follow INR, reviewed and documented as above.   Actively

## 2020-09-02 NOTE — FLOWSHEET NOTE
09/02/20 0815   Assessment   Charting Type Shift assessment   Neurological   Neuro (WDL) WDL   Level of Consciousness 0   [place] Coma Scale   Eye Opening 4   Best Verbal Response 5   Best Motor Response 6   [place] Coma Scale Score 15   HEENT   HEENT (WDL) X   Right Eye Impaired vision; Intact   Left Eye Impaired vision; Intact   Respiratory   Respiratory (WDL) WDL   Cardiac   Cardiac (WDL) X  (Admitted with a. fib/RVR)   Cardiac Regularity Regular   Heart Sounds S1, S2   Cardiac Rhythm NSR   Rhythm Interpretation   Pulse 64   Cardiac Monitor   Telemetry Monitor On Yes   Telemetry Audible Yes   Telemetry Alarms Set Yes   Gastrointestinal   Abdominal (WDL) X  (sbo)   GI Symptoms Bloating   Abdomen Inspection Distended; Taut   Tenderness Soft;Tenderness   RUQ Bowel Sounds Hypoactive   LUQ Bowel Sounds Hypoactive   RLQ Bowel Sounds Hypoactive   LLQ Bowel Sounds Hypoactive   Peripheral Vascular   Peripheral Vascular (WDL) WDL   Edema None   Skin Color/Condition   Skin Color/Condition (WDL) WDL   Skin Integrity   Skin Integrity (WDL) WDL   Musculoskeletal   Musculoskeletal (WDL) WDL   Genitourinary   Genitourinary (WDL) WDL   Anus/Rectum   Anus/Rectum (WDL) WDL   Psychosocial   Psychosocial (WDL) WDL

## 2020-09-02 NOTE — PROGRESS NOTES
NG tube removed without complications, pt tolerated well. Pt given water and Jell-O. Clear liquid tray requested from dietary.

## 2020-09-03 LAB
ALBUMIN SERPL-MCNC: 3.9 G/DL (ref 3.4–5)
ANION GAP SERPL CALCULATED.3IONS-SCNC: 10 MMOL/L (ref 3–16)
BUN BLDV-MCNC: 6 MG/DL (ref 7–20)
CALCIUM SERPL-MCNC: 8.9 MG/DL (ref 8.3–10.6)
CHLORIDE BLD-SCNC: 104 MMOL/L (ref 99–110)
CO2: 25 MMOL/L (ref 21–32)
CREAT SERPL-MCNC: 0.9 MG/DL (ref 0.8–1.3)
EKG ATRIAL RATE: 147 BPM
EKG DIAGNOSIS: NORMAL
EKG Q-T INTERVAL: 306 MS
EKG QRS DURATION: 92 MS
EKG QTC CALCULATION (BAZETT): 472 MS
EKG R AXIS: -47 DEGREES
EKG T AXIS: 88 DEGREES
EKG VENTRICULAR RATE: 143 BPM
GFR AFRICAN AMERICAN: >60
GFR NON-AFRICAN AMERICAN: >60
GLUCOSE BLD-MCNC: 121 MG/DL (ref 70–99)
HCT VFR BLD CALC: 40.3 % (ref 40.5–52.5)
HEMOGLOBIN: 13.5 G/DL (ref 13.5–17.5)
INR BLD: 1.98 (ref 0.86–1.14)
MCH RBC QN AUTO: 32 PG (ref 26–34)
MCHC RBC AUTO-ENTMCNC: 33.6 G/DL (ref 31–36)
MCV RBC AUTO: 95.1 FL (ref 80–100)
PDW BLD-RTO: 14.3 % (ref 12.4–15.4)
PHOSPHORUS: 2.3 MG/DL (ref 2.5–4.9)
PLATELET # BLD: 161 K/UL (ref 135–450)
PMV BLD AUTO: 8.1 FL (ref 5–10.5)
POTASSIUM SERPL-SCNC: 4.3 MMOL/L (ref 3.5–5.1)
PROTHROMBIN TIME: 23.1 SEC (ref 10–13.2)
RBC # BLD: 4.24 M/UL (ref 4.2–5.9)
SODIUM BLD-SCNC: 139 MMOL/L (ref 136–145)
WBC # BLD: 4.5 K/UL (ref 4–11)

## 2020-09-03 PROCEDURE — 99232 SBSQ HOSP IP/OBS MODERATE 35: CPT | Performed by: SURGERY

## 2020-09-03 PROCEDURE — 80069 RENAL FUNCTION PANEL: CPT

## 2020-09-03 PROCEDURE — 6370000000 HC RX 637 (ALT 250 FOR IP): Performed by: INTERNAL MEDICINE

## 2020-09-03 PROCEDURE — 36415 COLL VENOUS BLD VENIPUNCTURE: CPT

## 2020-09-03 PROCEDURE — 85610 PROTHROMBIN TIME: CPT

## 2020-09-03 PROCEDURE — 2580000003 HC RX 258: Performed by: FAMILY MEDICINE

## 2020-09-03 PROCEDURE — 6360000002 HC RX W HCPCS: Performed by: INTERNAL MEDICINE

## 2020-09-03 PROCEDURE — 93005 ELECTROCARDIOGRAM TRACING: CPT | Performed by: NURSE PRACTITIONER

## 2020-09-03 PROCEDURE — 85027 COMPLETE CBC AUTOMATED: CPT

## 2020-09-03 PROCEDURE — 2500000003 HC RX 250 WO HCPCS: Performed by: FAMILY MEDICINE

## 2020-09-03 PROCEDURE — 6370000000 HC RX 637 (ALT 250 FOR IP): Performed by: NURSE PRACTITIONER

## 2020-09-03 PROCEDURE — APPNB45 APP NON BILLABLE 31-45 MINUTES: Performed by: CLINICAL NURSE SPECIALIST

## 2020-09-03 PROCEDURE — 99232 SBSQ HOSP IP/OBS MODERATE 35: CPT | Performed by: NURSE PRACTITIONER

## 2020-09-03 PROCEDURE — 2060000000 HC ICU INTERMEDIATE R&B

## 2020-09-03 RX ORDER — DILTIAZEM HYDROCHLORIDE 180 MG/1
180 CAPSULE, COATED, EXTENDED RELEASE ORAL DAILY
Status: DISCONTINUED | OUTPATIENT
Start: 2020-09-03 | End: 2020-09-04 | Stop reason: HOSPADM

## 2020-09-03 RX ORDER — WARFARIN SODIUM 5 MG/1
5 TABLET ORAL
Status: COMPLETED | OUTPATIENT
Start: 2020-09-03 | End: 2020-09-03

## 2020-09-03 RX ORDER — METOPROLOL SUCCINATE 50 MG/1
50 TABLET, EXTENDED RELEASE ORAL NIGHTLY
Status: DISCONTINUED | OUTPATIENT
Start: 2020-09-03 | End: 2020-09-04 | Stop reason: HOSPADM

## 2020-09-03 RX ADMIN — METOPROLOL TARTRATE 5 MG: 5 INJECTION INTRAVENOUS at 02:30

## 2020-09-03 RX ADMIN — WARFARIN SODIUM 5 MG: 5 TABLET ORAL at 17:29

## 2020-09-03 RX ADMIN — FINASTERIDE 5 MG: 5 TABLET, FILM COATED ORAL at 07:38

## 2020-09-03 RX ADMIN — LEVOTHYROXINE SODIUM 75 MCG: 0.05 TABLET ORAL at 05:09

## 2020-09-03 RX ADMIN — DILTIAZEM HYDROCHLORIDE 180 MG: 180 CAPSULE, COATED, EXTENDED RELEASE ORAL at 09:40

## 2020-09-03 RX ADMIN — METOPROLOL SUCCINATE 50 MG: 50 TABLET, EXTENDED RELEASE ORAL at 20:13

## 2020-09-03 RX ADMIN — ENOXAPARIN SODIUM 80 MG: 80 INJECTION SUBCUTANEOUS at 07:38

## 2020-09-03 RX ADMIN — TAMSULOSIN HYDROCHLORIDE 0.4 MG: 0.4 CAPSULE ORAL at 07:38

## 2020-09-03 RX ADMIN — Medication 10 ML: at 20:13

## 2020-09-03 ASSESSMENT — PAIN SCALES - GENERAL
PAINLEVEL_OUTOF10: 0

## 2020-09-03 NOTE — PROGRESS NOTES
Hospitalist Progress Note      PCP: Khurram Holley. Brayan March MD    Date of Admission: 8/30/2020    Chief Complaint: Abdominal Pain    Subjective: no new c/o. Medications:  Reviewed    Infusion Medications    dextrose 5% and 0.45% NaCl with KCl 20 mEq 100 mL/hr at 09/02/20 1855     Scheduled Medications    enoxaparin  1 mg/kg Subcutaneous BID    tamsulosin  0.4 mg Oral Daily    levothyroxine  75 mcg Oral Daily    finasteride  5 mg Oral Daily    sodium chloride flush  10 mL Intravenous 2 times per day    metoprolol  5 mg Intravenous Q8H     PRN Meds: phenol, sodium chloride flush, acetaminophen **OR** acetaminophen, promethazine **OR** ondansetron      Intake/Output Summary (Last 24 hours) at 9/3/2020 0903  Last data filed at 9/3/2020 0326  Gross per 24 hour   Intake 1485 ml   Output 950 ml   Net 535 ml       Physical Exam Performed:    /68   Pulse 66   Temp 97.9 °F (36.6 °C) (Oral)   Resp 16   Ht 5' 10\" (1.778 m)   Wt 180 lb 12.8 oz (82 kg)   SpO2 94%   BMI 25.94 kg/m²     General appearance: No apparent distress, appears stated age and cooperative. HEENT: Pupils equal, round, and reactive to light. Conjunctivae/corneas clear. NGT in place  Neck: Supple, with full range of motion. No jugular venous distention. Trachea midline. Respiratory:  Normal respiratory effort. Clear to auscultation, bilaterally without Rales/Wheezes/Rhonchi. Cardiovascular: Regular rate and rhythm with normal S1/S2 without murmurs, rubs or gallops. Abdomen: Soft, non-tender, non-distended with normal bowel sounds. Musculoskeletal: No clubbing, cyanosis or edema bilaterally. Full range of motion without deformity. Skin: Skin color, texture, turgor normal.  No rashes or lesions. Neurologic:  Neurovascularly intact without any focal sensory/motor deficits.  Cranial nerves: II-XII intact, grossly non-focal.  Psychiatric: Alert and oriented, thought content appropriate, normal insight  Capillary Refill: Brisk,< 3 seconds   Peripheral Pulses: +2 palpable, equal bilaterally       Labs:   Recent Labs     09/01/20  0548 09/02/20  0643 09/03/20  0558   WBC 4.5 4.9 4.5   HGB 14.5 13.5 13.5   HCT 43.3 40.3* 40.3*    150 161     Recent Labs     09/01/20  0548 09/02/20  0643 09/03/20  0558    139 139   K 4.2 4.6 4.3    102 104   CO2 23 25 25   BUN 13 9 6*   CREATININE 0.8 0.8 0.9   CALCIUM 8.9 8.8 8.9   PHOS 2.0* 2.5 2.3*     No results for input(s): AST, ALT, BILIDIR, BILITOT, ALKPHOS in the last 72 hours. Recent Labs     09/01/20  0548 09/02/20  0643 09/03/20  0558   INR 2.05* 1.94* 1.98*     No results for input(s): CKTOTAL, TROPONINI in the last 72 hours. Urinalysis:    No results found for: NITRU, WBCUA, BACTERIA, RBCUA, BLOODU, SPECGRAV, GLUCOSEU    Consults:    IP CONSULT TO GENERAL SURGERY  IP CONSULT TO CARDIOLOGY      Assessment/Plan:    Active Hospital Problems    Diagnosis    Small bowel obstruction ([facility] [address] [G00.390]    S/P AVR (aortic valve replacement) [Z95.2]    Paroxysmal atrial fibrillation ([address] [I48.0]    Hyperlipidemia [E78.5]    Hypertension [I10]    Atrioventricular block, complete ([address] [I44.2]         Small bowel obstruction - w/ hx hernia repair. Pt transferred [facility] ED. CT abdomen done there showed SBO with transition point in LLQ. General Surgery consulted and appreciated. NG Tube placed 31 August w/ subjective improvement, removed 2 Sept.  Continue bowel rest w/ IVF. SBFT in 2 Sept w/ normal transit time.     HTN/CAD - w/ known CAD but no evidence of active signs/sxs of ischemia/failure. Currently controlled on home meds w/ vitals reviewed and documented as above. Afib - w/ RVR POArrival, of unspecified and clinically unable to determine etiology. Normally rate controlled on BBlocker/CCBlocker - held due to NPO status. Anticoagulated at baseline on home Coumadin - held and placed on Tx dose LMWH. Follow INR, reviewed and documented as above. Actively adjusting Coumadin dosing as required. Monitored on tele. S/P Porcine AVR. S/P Pacer. Cardiology consulted and appreciated. HypoThyroid - clinically euthyroid on oral replacement therapy. Continue, w/ outpt monitoring as previously arranged. BPH - w/out acute obstructive Uropathy, controlled on home medications as ordered - continued.         DVT Prophylaxis: LMWH   Diet: DIET CLEAR LIQUID;  Code Status: Full Code      PT/OT Eval Status: not yet ordered. Dispo - Likely Thurs 3 Sept at the earliest pending clinical course.      Janus Holter, MD

## 2020-09-03 NOTE — PLAN OF CARE
Problem: Pain:  Goal: Pain level will decrease  Description: Pain level will decrease  Outcome: Ongoing  Goal: Control of acute pain  Description: Control of acute pain  Outcome: Ongoing  Goal: Control of chronic pain  Description: Control of chronic pain  Outcome: Ongoing     Problem: Falls - Risk of:  Goal: Will remain free from falls  Description: Will remain free from falls  9/3/2020 0751 by Sujey Abdul RN  Outcome: Ongoing  9/3/2020 0443 by Camilo Phillips RN  Outcome: Ongoing  Goal: Absence of physical injury  Description: Absence of physical injury  9/3/2020 0751 by Sujey Abdul RN  Outcome: Ongoing  9/3/2020 0443 by Camilo Phillips RN  Outcome: Ongoing

## 2020-09-03 NOTE — CONSULTS
Pharmacy to Dose Warfarin    - Dx: porcine AVR  - Goal INR range 2-3   - Home Warfarin dose: 3.75 mg (2.5 mg x 1.5) every Mon, Fri; 2.5 mg (2.5 mg x 1) all other days  - Coumadin was on hold since the admission  - lovenox 1mg/kg bid started on 9/1 at 21:00    Date  INR  Warfarin  9/3                 1.98                  5 mg  Recommend Warfarin 5 mg tonight x1. Daily INR ordered. Rx will continue to manage therapy per NP's consult order.

## 2020-09-03 NOTE — PROGRESS NOTES
Perfect serve sent to Sara Elliott NP: \"Patient experienced two episodes of PAT, HR up to 150-160. HR currently at about 110. Patient asymptomatic, sleeping at this time. It looks like he did have some PAT runs last night 9/1 as well. \"  No new orders received. Perfect serve sent to Sara Elliott NP: \"Patient flipped out of NSR, to afib. EKG done, results in epic. It looks like A fib RVR. Patient has been receiving metoprolol iv, but has not received any of his home dose of dilt which he takes 180mg one time daily. No new orders received.

## 2020-09-03 NOTE — PROGRESS NOTES
[facility]     Electrophysiology                                     Progress Note    Admission date:  2020    Reason for follow up visit: PAF    HPI/CC: Adenike Ericksno was admitted on 2020 from [facility] with abdominal pain and has been treated for SBO. EP following for PAF. Rhythm has been sinus with intermittent pacing with 3 hours of PAF overnight. Subjective: He had palpitations overnight. Denies chest pain, shortness of breath, and dizziness. Vitals:  Blood pressure 109/68, pulse 66, temperature 97.9 °F (36.6 °C), temperature source Oral, resp. rate 16, height 5' 10\" (1.778 m), weight 180 lb 12.8 oz (82 kg), SpO2 94 %.   Temp  Av.7 °F (36.5 °C)  Min: 97.5 °F (36.4 °C)  Max: 97.9 °F (36.6 °C)  Pulse  Av  Min: 66  Max: 75  BP  Min: 109/68  Max: 165/90  SpO2  Av.7 %  Min: 92 %  Max: 96 %    24 hour I/O    Intake/Output Summary (Last 24 hours) at 9/3/2020 09  Last data filed at 9/3/2020 0326  Gross per 24 hour   Intake 1485 ml   Output 950 ml   Net 535 ml     Current Facility-Administered Medications   Medication Dose Route Frequency Provider Last Rate Last Dose    enoxaparin (LOVENOX) injection 80 mg  1 mg/kg Subcutaneous BID Donna Wyman MD   80 mg at 20 0738    phenol 1.4 % mouth spray 1 spray  1 spray Mouth/Throat Q2H PRN Prieto Castillo MD        tamsulosin [facility]) capsule 0.4 mg  0.4 mg Oral Daily Donna Wyman MD   0.4 mg at 20 3861    levothyroxine (SYNTHROID) tablet 75 mcg  75 mcg Oral Daily Donna Wyman MD   75 mcg at 20 0509    finasteride (PROSCAR) tablet 5 mg  5 mg Oral Daily Donna Wyman MD   5 mg at 20 0231    sodium chloride flush 0.9 % injection 10 mL  10 mL Intravenous 2 times per day Conor Herman MD   10 mL at 20 2137    sodium chloride flush 0.9 % injection 10 mL  10 mL Intravenous PRN Conor Herman MD   10 mL at 20 0415    acetaminophen (TYLENOL) tablet 650 mg  650 mg Oral Q6H PRN Eddie Enriquez MD   650 mg at 08/30/20 2058    Or    acetaminophen (TYLENOL) suppository 650 mg  650 mg Rectal Q6H PRN Eddie Enriquez MD        promethazine (PHENERGAN) tablet 12.5 mg  12.5 mg Oral Q6H PRN Eddie Enriquez MD        Or    ondansetron (ZOFRAN) injection 4 mg  4 mg Intravenous Q6H PRN Eddie Enriquez MD        dextrose 5 % and 0.45 % NaCl with KCl 20 mEq infusion   Intravenous Continuous Eddie Enriquez  mL/hr at 09/02/20 1855      metoprolol (LOPRESSOR) injection 5 mg  5 mg Intravenous Q8H Eddie Enriquez MD   5 mg at 09/03/20 0230       Objective:     Telemetry monitor: sinus/int pacing    Physical Exam:  Constitutional and general appearance: alert, cooperative, no distress and appears stated age  HEENT: PERRL, no cervical lymphadenopathy. No masses palpable. Normal oral mucosa  Respiratory:  · Normal excursion and expansion without use of accessory muscles  · Resp auscultation: Normal breath sounds without wheezing, rhonchi, and rales  Cardiovascular:  · The apical impulse is not displaced  · Heart tones are crisp and normal. regular S1 and S2.  · Jugular venous pulsation Normal  · The carotid upstroke is normal in amplitude and contour without delay or bruit  · Peripheral pulses are symmetrical and full   Abdomen:  · No masses; no tenderness  · Bowel sounds hypoactive   Extremities:  ·  No cyanosis or clubbing  ·  No lower extremity edema but + evidence of PVD  ·  Skin: warm and dry  Neurological:  · Alert and oriented  · Moves all extremities well  · No abnormalities of mood, affect, memory, mentation, or behavior are noted    Data  Echo 1/10/2019:   Left ventricular systolic function is normal with ejection fraction estimated at 55%. No regional wall motion abnormalities. There is moderate concentric left ventricular hypertrophy. Grade II diastolic dysfunction with elevated left ventricular filling pressure. Moderate bi-atrial enlargement.    A bioprosthetic aortic valve appears well seated with normal doppler parameters for valve. Mild to moderate tricuspid regurgitation. Systolic pulmonary artery pressure (SPAP) is estimated at 46 mmHg consistent with mild pulmonary hypertension (Right atrial pressure of 3 mmHg). [facility] 1/11/2019 [place]):  CORONARY ARTERIES     LM <10% stenosis. LAD Large vessel. Wraps around apex. Prox 10% stenosis. Mid-distal 10% stenosis. LCX Prox-mid 60-70% stenosis. RCA Dominant. Prox-mid 20-30% stenosis. Mid-distal 50-60% stenosis. Overall RCA is small to medium sized vessel.      CONCLUSIONS:      Moderate LCx/RCA disease correlating with stress test     I think medical management for this is reasonable for this and would incrs cardizem from 180mg daily to 240mg daily     Would additional consider additional tx for afib such as ablation, pt plans on f/u w/ dr Marco Antonio Mclean for this     D/w pt/family that PCI can certainly be done for cad/ashd but he would have to go on triple anticoagulation and disease can initially be managed medically and would reserve PCI for angina that is referactory to medical theray    All labs and testing reviewed.   Lab Review     Renal Profile:   Lab Results   Component Value Date    CREATININE 0.9 09/03/2020    BUN 6 09/03/2020     09/03/2020    K 4.3 09/03/2020    K 3.9 08/31/2020     09/03/2020    CO2 25 09/03/2020     CBC:    Lab Results   Component Value Date    WBC 4.5 09/03/2020    RBC 4.24 09/03/2020    HGB 13.5 09/03/2020    HCT 40.3 09/03/2020    MCV 95.1 09/03/2020    RDW 14.3 09/03/2020     09/03/2020     BNP:  No results found for: BNP  Fasting Lipid Panel:    Lab Results   Component Value Date    CHOL 203 05/07/2020    HDL 58 05/07/2020    HDL 68 03/14/2011    TRIG 140 05/07/2020     Cardiac Enzymes:  CK/MbTroponin  Lab Results   Component Value Date    TROPONINI <0.01 08/31/2020     PT/ INR   Lab Results   Component Value Date    INR 1.98 09/03/2020 INR 1.94 09/02/2020    INR 2.05 09/01/2020    PROTIME 23.1 09/03/2020    PROTIME 22.6 09/02/2020    PROTIME 23.9 09/01/2020    PROTIME 30.5 05/28/2013    PROTIME 40.7 05/07/2013    PROTIME 25.7 04/02/2013     PTT No results found for: PTT   Lab Results   Component Value Date    MG 2.50 09/02/2020      Lab Results   Component Value Date    TSH 5.030 03/29/2019       Assessment:  Paroxysmal atrial arrhythmias: stable   -recurrent this admission (spontaneous conversion to sinus)   -s/p RFCA of atrial flutter 2012   -s/p RFCA for afib and typical atrial flutter 2/2019   -amiodarone stopped 11/2019 per Dr. Arely Patterson    -[id number] score 3 (age, HTN, vascular disease)  AV block: stable   -s/p dual chamber pacemaker implant 2006 ([employer])  HTN: controlled   CAD: moderate   -s/p C 1/2019  Aortic stenosis: s/p porcine AVR and VSD closure 2006  Hypothyroidism: on Synthroid   SBO    Plan:   1. Restart Coumadin (OK with surgery). Would continue Lovenox while admitted/until INR is 2   2. Stop IV metoprolol  3. Restart home Toprol 50mg po QD and Cardizem 180mg po QD    EP will sign off but remains available if needed. Follow up with EP in 6 weeks. Office arranging appointment.      SALVADOR King-CNP  [facility]  [phone]

## 2020-09-03 NOTE — PROGRESS NOTES
Comprehensive Nutrition Assessment    Type and Reason for Visit:  Initial(LOS)    Nutrition Recommendations/Plan:   1. Low fiber as tolerated  2. Monitor need for nutrition supplements  3. Monitor need for education  4. Will monitor nutritional adequacy, nutrition-related labs, weights, BMs, and clinical progress     Nutrition Assessment:  Patient admitted with small bowel obstruction. History of hernia repair. NG initially placed 8/31 and removed on 9/2. Diet just progressed to solid foods this afternoon, low fiber per general surgery. Possible discharge tomorrow. Will monitor nutrition tolerance closely. Malnutrition Assessment:  Malnutrition Status: At risk for malnutrition (Comment)      Estimated Daily Nutrient Needs:  Energy (kcal):  9211-0796; Weight Used for Energy Requirements:  Ideal     Protein (g):  ; Weight Used for Protein Requirements:  Ideal(1.3-1.5)        Fluid (ml/day):  1 kcal/ml; Weight Used for Fluid Requirements:         Nutrition Related Findings:  patient hungry for solid foods      Wounds:  None       Current Nutrition Therapies:    DIET LOW FIBER; Anthropometric Measures:  · Height: 5' 10\" (177.8 cm)  · Current Body Weight: 180 lb 12 oz (82 kg)   · Ideal Body Weight: 166 lbs; % Ideal Body Weight     · BMI: 25.9  · BMI Categories: Overweight (BMI 25.0-29. 9)       Nutrition Diagnosis:   · Increased nutrient needs related to increase demand for energy/nutrients, altered GI function as evidenced by (recent GI surgery; recent limited access to nutrition this admission)    Nutrition Interventions:   Food and/or Nutrient Delivery:  Continue Current Diet  Coordination of Nutrition Care:  Continued Inpatient Monitoring    Goals:  Patient will eat 50% or greater of meals and supplements without GI distress.        Nutrition Monitoring and Evaluation:   Behavioral-Environmental Outcomes:      Food/Nutrient Intake Outcomes:  Food and Nutrient Intake  Physical Signs/Symptoms Outcomes:        Discharge Planning:    Continue current diet     Electronically signed by Mark Rosales RD, LD on 9/3/20 at 2:24 PM EDT    Contact: Office: [phone]; [address] [place] [address] Road: [phone]

## 2020-09-04 VITALS
RESPIRATION RATE: 16 BRPM | TEMPERATURE: 97.7 F | WEIGHT: 177.7 LBS | DIASTOLIC BLOOD PRESSURE: 71 MMHG | HEART RATE: 63 BPM | BODY MASS INDEX: 25.44 KG/M2 | HEIGHT: 70 IN | OXYGEN SATURATION: 98 % | SYSTOLIC BLOOD PRESSURE: 111 MMHG

## 2020-09-04 LAB
INR BLD: 1.81 (ref 0.86–1.14)
PROTHROMBIN TIME: 21.1 SEC (ref 10–13.2)

## 2020-09-04 PROCEDURE — 6370000000 HC RX 637 (ALT 250 FOR IP): Performed by: INTERNAL MEDICINE

## 2020-09-04 PROCEDURE — 99231 SBSQ HOSP IP/OBS SF/LOW 25: CPT | Performed by: SURGERY

## 2020-09-04 PROCEDURE — 6370000000 HC RX 637 (ALT 250 FOR IP): Performed by: NURSE PRACTITIONER

## 2020-09-04 PROCEDURE — 85610 PROTHROMBIN TIME: CPT

## 2020-09-04 PROCEDURE — 2580000003 HC RX 258: Performed by: FAMILY MEDICINE

## 2020-09-04 RX ORDER — WARFARIN SODIUM 5 MG/1
5 TABLET ORAL
Status: DISCONTINUED | OUTPATIENT
Start: 2020-09-04 | End: 2020-09-04 | Stop reason: HOSPADM

## 2020-09-04 RX ADMIN — DILTIAZEM HYDROCHLORIDE 180 MG: 180 CAPSULE, COATED, EXTENDED RELEASE ORAL at 08:29

## 2020-09-04 RX ADMIN — LEVOTHYROXINE SODIUM 75 MCG: 0.05 TABLET ORAL at 05:28

## 2020-09-04 RX ADMIN — TAMSULOSIN HYDROCHLORIDE 0.4 MG: 0.4 CAPSULE ORAL at 08:28

## 2020-09-04 RX ADMIN — Medication 10 ML: at 08:30

## 2020-09-04 ASSESSMENT — PAIN SCALES - GENERAL
PAINLEVEL_OUTOF10: 0

## 2020-09-04 NOTE — PROGRESS NOTES
Received a call , pt having 7sec run of SVT. Pt appears calm , denies any chest pain or SOB. Francisco updated.  Pt updated about his INR

## 2020-09-04 NOTE — PROGRESS NOTES
Pharmacy to Dose Warfarin    - Dx: porcine AVR  - Goal INR range 2-3   - Home Warfarin dose: 3.75 mg (2.5 mg x 1.5) every Mon, Fri; 2.5 mg (2.5 mg x 1) all other days  - Coumadin was on hold since the admission  - lovenox 1mg/kg bid started on 9/1 at 21:00    Date  INR  Warfarin  9/3                 1.98                  5 mg  9/4                 1.81                  5 mg  Recommend Warfarin 5 mg tonight x1. Daily INR ordered. Rx will continue to manage therapy per NP's  consult order.     Kaitlin Dominguez/Manuela. 9/4/20 9:53 AM EDT

## 2020-09-04 NOTE — PROGRESS NOTES
normal insight  Capillary Refill: Brisk,< 3 seconds   Peripheral Pulses: +2 palpable, equal bilaterally       Labs:   Recent Labs     09/02/20  0643 09/03/20  0558   WBC 4.9 4.5   HGB 13.5 13.5   HCT 40.3* 40.3*    161     Recent Labs     09/02/20  0643 09/03/20  0558    139   K 4.6 4.3    104   CO2 25 25   BUN 9 6*   CREATININE 0.8 0.9   CALCIUM 8.8 8.9   PHOS 2.5 2.3*     No results for input(s): AST, ALT, BILIDIR, BILITOT, ALKPHOS in the last 72 hours. Recent Labs     09/02/20  0643 09/03/20  0558 09/04/20  0539   INR 1.94* 1.98* 1.81*     No results for input(s): CKTOTAL, TROPONINI in the last 72 hours. Urinalysis:    No results found for: NITRU, WBCUA, BACTERIA, RBCUA, BLOODU, SPECGRAV, GLUCOSEU    Consults:    IP CONSULT TO GENERAL SURGERY  IP CONSULT TO CARDIOLOGY  IP CONSULT TO PHARMACY      Assessment/Plan:    Active Hospital Problems    Diagnosis    Small bowel obstruction ([facility] [address] [W16.723]    S/P AVR (aortic valve replacement) [Z95.2]    Paroxysmal atrial fibrillation ([facility] [address] [I48.0]    Hyperlipidemia [E78.5]    Hypertension [I10]    Atrioventricular block, complete ([facility] [address] [I44.2]         Small bowel obstruction - w/ hx hernia repair. Pt transferred from [facility] ED. CT abdomen done there showed SBO with transition point in LLQ. General Surgery consulted and appreciated. NG Tube placed 31 August w/ subjective improvement, removed 2 Sept.  Continue bowel rest w/ IVF. SBFT in 2 Sept w/ normal transit time.     HTN/CAD - w/ known CAD but no evidence of active signs/sxs of ischemia/failure. Currently controlled on home meds w/ vitals reviewed and documented as above. Afib - w/ RVR POArrival, of unspecified and clinically unable to determine etiology. Normally rate controlled on BBlocker/CCBlocker - held due to NPO status. Anticoagulated at baseline on home Coumadin - held and placed on Tx dose LMWH. Follow INR, reviewed and documented as above.   Actively adjusting Coumadin dosing as required. Monitored on tele. S/P Porcine AVR. S/P Pacer. Cardiology consulted and appreciated. HypoThyroid - clinically euthyroid on oral replacement therapy. Continue, w/ outpt monitoring as previously arranged. BPH - w/out acute obstructive Uropathy, controlled on home medications as ordered - continued.         DVT Prophylaxis: LMWH   Diet: DIET LOW FIBER;  Code Status: Full Code      PT/OT Eval Status: not yet ordered.      Dispo - OK to home Friday 4 Sept     Janus Holter, MD

## 2020-09-04 NOTE — PROGRESS NOTES
[facility]    PATIENT NAME: Jonas Encinas     TODAY'S DATE: 9/4/2020    CHIEF COMPLAINT: none    INTERVAL HISTORY/HPI:    Pt feeling well, had another BM this AM, eating solid food, no nausea, pain. OBJECTIVE:  VITALS:  /71   Pulse 63   Temp 97.7 °F (36.5 °C) (Oral)   Resp 16   Ht 5' 10\" (1.778 m)   Wt 177 lb 11.2 oz (80.6 kg)   SpO2 98%   BMI 25.50 kg/m²     INTAKE/OUTPUT:    I/O last 3 completed shifts: In: 1945 [P.O.:360; I.V.:1585]  Out: 2750 [Urine:2750]  I/O this shift:  In: 240 [P.O.:240]  Out: -     CONSTITUTIONAL:  awake and alert  LUNGS:     ABDOMEN:   , soft, non-distended,       Data:  CBC:   Recent Labs     09/02/20  0643 09/03/20  0558   WBC 4.9 4.5   HGB 13.5 13.5   HCT 40.3* 40.3*    161     BMP:    Recent Labs     09/02/20  0643 09/03/20  0558    139   K 4.6 4.3    104   CO2 25 25   BUN 9 6*   CREATININE 0.8 0.9   GLUCOSE 95 121*     Hepatic: No results for input(s): AST, ALT, ALB, BILITOT, ALKPHOS in the last 72 hours. Mag:      Recent Labs     09/02/20  0643   MG 2.50*      Phos:     Recent Labs     09/02/20  0643 09/03/20  0558   PHOS 2.5 2.3*      INR:   Recent Labs     09/02/20  0643 09/03/20  0558 09/04/20  0539   INR 1.94* 1.98* 1.81*       Radiology Review:         ASSESSMENT AND PLAN:  SBO, resolved   - cont diet as tolerated   - no surgical intervention at this time.      - will sign off    Electronically signed by Sadiq Lewis MD

## 2020-09-06 NOTE — DISCHARGE SUMMARY
Hospital Medicine Discharge Summary    Patient ID: Jorge Marrero      Patient's PCP: Faby Pike. Gustavo James MD    Admit Date: 8/30/2020     Discharge Date: 9/4/2020      Admitting Physician: Ty Ramires MD     Discharge Physician: Rito Javier MD     Discharge Diagnoses: Active Hospital Problems    Diagnosis    Small bowel obstruction ([facility] [address] [K56.609]    S/P AVR (aortic valve replacement) [Z95.2]    Paroxysmal atrial fibrillation ([address] [I48.0]    Hyperlipidemia [E78.5]    Hypertension [I10]    Atrioventricular block, complete ([address] [I44.2]       The patient was seen and examined on day of discharge and this discharge summary is in conjunction with any daily progress note from day of discharge. Hospital Course:          Small bowel obstruction - w/ hx hernia repair. Pt transferred from [facility] ED. CT abdomen done there showed SBO with transition point in LLQ. General Surgery consulted and appreciated. NG Tube placed 31 August w/ subjective improvement, removed 2 Sept.  SBFT in 2 Sept w/ normal transit time.     HTN/CAD - w/ known CAD but no evidence of active signs/sxs of ischemia/failure. Currently controlled on home meds     Afib - w/ RVR POArrival, of unspecified and clinically unable to determine etiology. Normally rate controlled on BBlocker/CCBlocker - held due to NPO status. Anticoagulated at baseline on home Coumadin - held and placed on Tx dose LMWH. Followed INR. Monitored on tele. S/P Porcine AVR. S/P Pacer. Cardiology consulted and appreciated.      HypoThyroid - clinically euthyroid on oral replacement therapy. Continue, w/ outpt monitoring as previously arranged.      BPH - w/out acute obstructive Uropathy, controlled on home medications as ordered - continued.        Labs:  For convenience and continuity at follow-up the following most recent labs are provided:      CBC:    Lab Results   Component Value Date    WBC 4.5 09/03/2020    HGB 13.5 09/03/2020 HCT 40.3 09/03/2020     09/03/2020       Renal:    Lab Results   Component Value Date     09/03/2020    K 4.3 09/03/2020    K 3.9 08/31/2020     09/03/2020    CO2 25 09/03/2020    BUN 6 09/03/2020    CREATININE 0.9 09/03/2020    CALCIUM 8.9 09/03/2020    PHOS 2.3 09/03/2020         Significant Diagnostic Studies    Radiology:   FL SMALL BOWEL FOLLOW THROUGH ONLY   Final Result   Multiple dilated loops of small bowel are seen within the left hemiabdomen   though small-bowel transit time is within normal limits. The findings could   reflect resolving obstruction or localized ileus. XR ACUTE ABD SERIES CHEST 1 VW   Final Result   1. Mild bibasilar atelectasis. 2. Persistent small bowel obstruction, without evidence of free air. 3. Appropriate position of NG tube within the stomach. XR ABDOMEN (2 VIEWS)   Final Result   Persistent small bowel dilatation, in keeping with patient's history of small   bowel obstruction. XR ABDOMEN (KUB) (SINGLE AP VIEW)   Final Result   Nasogastric tube is looped within the stomach with tip overlying the expected   location of the gastric antrum and side hole well beyond the GE junction. Consults:     IP CONSULT TO GENERAL SURGERY  IP CONSULT TO CARDIOLOGY  IP CONSULT TO PHARMACY    Disposition: home    Condition at Discharge: Stable    Discharge Instructions/Follow-up:  w/ PCP 1-2 weeks and subspecialists as arranged.      Code Status:  Full Code    Activity: activity as tolerated    Diet: regular diet      Discharge Medications:     Discharge Medication List as of 9/4/2020 11:57 AM           Details   warfarin (COUMADIN) 2.5 MG tablet One tab by mouth daily or as directed, Disp-90 tablet, R-3WARFARIN BRAND NAME ONLYPrint      diltiazem (CARDIZEM CD) 180 MG extended release capsule Take 1 capsule by mouth daily, Disp-90 capsule, R-3Normal      metoprolol succinate (TOPROL XL) 50 MG extended release tablet Take 1 tablet by mouth nightly, Disp-90 tablet, R-3Print      furosemide (LASIX) 40 MG tablet Take 1 tablet by mouth  daily, Disp-90 tablet, R-3Print      aspirin EC 81 MG EC tablet Take 1 tablet by mouth daily, Disp-90 tablet, R-3Normal      finasteride (PROSCAR) 5 MG tablet Take 1 tablet by mouth nightly., Disp-90 tablet, R-3      Multiple Vitamins-Minerals (MULTIVITAMIN PO) Take 1 tablet by mouth daily. levothyroxine (LEVOTHROID) 75 MCG tablet Take 75 mcg by mouth daily. tamsulosin (FLOMAX) 0.4 MG capsule Take 0.4 mg by mouth daily. Time Spent on discharge is more than 30 minutes in the examination, evaluation, counseling and review of medications and discharge plan. Signed:    Melanie Marcos MD   9/6/2020      Thank you Evelyn Guerrero. Cassandra Russell MD for the opportunity to be involved in this patient's care. If you have any questions or concerns please feel free to contact me at [phone].

## 2020-09-14 ENCOUNTER — ANTI-COAG VISIT (OUTPATIENT)
Dept: CARDIOLOGY CLINIC | Age: 75
End: 2020-09-14

## 2020-09-21 LAB — INR BLD: 2.5

## 2020-09-22 ENCOUNTER — ANTI-COAG VISIT (OUTPATIENT)
Dept: CARDIOLOGY CLINIC | Age: 75
End: 2020-09-22

## 2020-10-05 ENCOUNTER — TELEPHONE (OUTPATIENT)
Dept: CARDIOLOGY CLINIC | Age: 75
End: 2020-10-05

## 2020-10-05 ENCOUNTER — ANTI-COAG VISIT (OUTPATIENT)
Dept: CARDIOLOGY CLINIC | Age: 75
End: 2020-10-05
Payer: MEDICARE

## 2020-10-05 LAB — INR BLD: 1.7

## 2020-10-05 PROCEDURE — 93793 ANTICOAG MGMT PT WARFARIN: CPT | Performed by: INTERNAL MEDICINE

## 2020-10-12 ENCOUNTER — ANTI-COAG VISIT (OUTPATIENT)
Dept: CARDIOLOGY CLINIC | Age: 75
End: 2020-10-12

## 2020-10-12 LAB — INR BLD: 1.7

## 2020-10-12 RX ORDER — WARFARIN SODIUM 2.5 MG/1
TABLET ORAL
Qty: 180 TABLET | Refills: 1 | Status: SHIPPED | OUTPATIENT
Start: 2020-10-12 | End: 2021-05-10

## 2020-10-19 ENCOUNTER — ANTI-COAG VISIT (OUTPATIENT)
Dept: CARDIOLOGY CLINIC | Age: 75
End: 2020-10-19
Payer: MEDICARE

## 2020-10-19 LAB — INR BLD: 1.8

## 2020-10-19 PROCEDURE — 93793 ANTICOAG MGMT PT WARFARIN: CPT | Performed by: INTERNAL MEDICINE

## 2020-10-26 LAB — INR BLD: 1.9

## 2020-10-27 ENCOUNTER — ANTI-COAG VISIT (OUTPATIENT)
Dept: CARDIOLOGY CLINIC | Age: 75
End: 2020-10-27
Payer: MEDICARE

## 2020-10-27 DIAGNOSIS — Z95.2 S/P AVR (AORTIC VALVE REPLACEMENT): Chronic | ICD-10-CM

## 2020-10-27 PROCEDURE — 93793 ANTICOAG MGMT PT WARFARIN: CPT | Performed by: INTERNAL MEDICINE

## 2020-11-02 ENCOUNTER — ANTI-COAG VISIT (OUTPATIENT)
Dept: CARDIOLOGY CLINIC | Age: 75
End: 2020-11-02
Payer: MEDICARE

## 2020-11-02 DIAGNOSIS — I48.0 PAROXYSMAL ATRIAL FIBRILLATION (HCC): Chronic | ICD-10-CM

## 2020-11-02 LAB — INR BLD: 2.4

## 2020-11-02 PROCEDURE — 93793 ANTICOAG MGMT PT WARFARIN: CPT | Performed by: INTERNAL MEDICINE

## 2020-11-10 ENCOUNTER — NURSE ONLY (OUTPATIENT)
Dept: CARDIOLOGY CLINIC | Age: 75
End: 2020-11-10
Payer: MEDICARE

## 2020-11-10 ENCOUNTER — OFFICE VISIT (OUTPATIENT)
Dept: CARDIOLOGY CLINIC | Age: 75
End: 2020-11-10
Payer: MEDICARE

## 2020-11-10 VITALS
HEART RATE: 60 BPM | DIASTOLIC BLOOD PRESSURE: 68 MMHG | HEIGHT: 70 IN | BODY MASS INDEX: 25.22 KG/M2 | SYSTOLIC BLOOD PRESSURE: 124 MMHG | WEIGHT: 176.2 LBS | OXYGEN SATURATION: 97 %

## 2020-11-10 PROCEDURE — 4040F PNEUMOC VAC/ADMIN/RCVD: CPT | Performed by: INTERNAL MEDICINE

## 2020-11-10 PROCEDURE — 93280 PM DEVICE PROGR EVAL DUAL: CPT | Performed by: INTERNAL MEDICINE

## 2020-11-10 PROCEDURE — G8427 DOCREV CUR MEDS BY ELIG CLIN: HCPCS | Performed by: INTERNAL MEDICINE

## 2020-11-10 PROCEDURE — 99214 OFFICE O/P EST MOD 30 MIN: CPT | Performed by: INTERNAL MEDICINE

## 2020-11-10 PROCEDURE — 1036F TOBACCO NON-USER: CPT | Performed by: INTERNAL MEDICINE

## 2020-11-10 PROCEDURE — 93000 ELECTROCARDIOGRAM COMPLETE: CPT | Performed by: INTERNAL MEDICINE

## 2020-11-10 PROCEDURE — G8417 CALC BMI ABV UP PARAM F/U: HCPCS | Performed by: INTERNAL MEDICINE

## 2020-11-10 PROCEDURE — G8484 FLU IMMUNIZE NO ADMIN: HCPCS | Performed by: INTERNAL MEDICINE

## 2020-11-10 PROCEDURE — 3017F COLORECTAL CA SCREEN DOC REV: CPT | Performed by: INTERNAL MEDICINE

## 2020-11-10 PROCEDURE — 1123F ACP DISCUSS/DSCN MKR DOCD: CPT | Performed by: INTERNAL MEDICINE

## 2020-11-10 RX ORDER — METOPROLOL SUCCINATE 100 MG/1
100 TABLET, EXTENDED RELEASE ORAL NIGHTLY
Qty: 90 TABLET | Refills: 3 | Status: SHIPPED | OUTPATIENT
Start: 2020-11-10 | End: 2021-08-20 | Stop reason: SDUPTHER

## 2020-11-10 NOTE — PROGRESS NOTES
[facility]   Electrophysiology Follow Up  Date: 11/10/2020     CC: Atrial fibrillation   HPI: Gallito Mcknight is a 76 y.o.  male with a history of permanent pacemaker for temporary AV block, post AVR (porcine), St. Geoff, and closure of VSD in 12/06. He had a radiofrequency catheter ablation for atrial flutter on 12/28/12. He has a history of PAF. Amiodarone was increased from 150 mg to 200 mg daily. He is on coumadin for anticoagulation. S/p 2/13/19 - Pulmonary vein isolations using wide area circumferential radiofrequency ablation. Additional ablation of CTI right atrial flutter     Hospitalized 9/2020 for SBO and he was off his coumadin because they were planning on surgery. Aly Castillo presents to the office in follow up. He has complaints of exertional chest pain. He complained of GABRIEL and chest pressure when he was working out in the yard. ECG today shows sinus rhythm. He states he has been under significant stress since his wife is being treated for breast cancer. He has noticed the episodes atrial fibrillation but they are not super bothersome. Assessment and plan:     Paroxysmal atrial fibrillation       2/13/19 - PVI and CTI right atrial flutter ablation    Feeling better after ablation. ECG today shows atrial paced rhythm. Afib burden on device interrogation is 6.1% longest episode was around 18 hours   He has had 1 episodes of atrial fibrillation which lasted 16 hours. Discussed treatment options include antiarrhythmic therapy. He was on amiodarone for a long time which was stopped. If recurrence atrial fibrillation we can consider redo ablation versus amiodarone therapy     Increase Toprol  mg daily   Denies having any palpitation no chest pressure or shortness of breath. On coumadin for [facility]   Previously on Amiodarone for several years but was discontinued 11/2019  Bradycardia     - Sinus node dysfunction:    S/p pacemaker implantation.     I reviewed device interrogation today. Device functions normally. St geoff PPM   The CIED was interrogated and programmed and I supervised and reviewed all the data. All findings and changes are in device interrogation sheat and reflect my personal interpretation and changes and is scanned to Epic. A paced 53% V paced <1 %   Follow up with device clinic as scheduled. HTN:  Controlled. Continue current medications. S/p St. Geoff bioprosthesis:    ALEJANDRINA with normal functioning valve. - CAD:    Follow up with  [address] [place] [address]    On statin     Diagnostics and imaging      EC/10/20  SR/ Atrial paced    ALEJANDRINA 19   Summary   Left ventricular systolic function is normal with ejection fraction   estimated at 55%. No regional wall motion abnormalities. There is concentric   left ventricular hypertrophy.    The left atrium is dilated.   Shay Lori is no evidence of mass or thrombus in the left atrium or appendage.     Nuclear stress:  19  Summary   Unable to assess LVEF and wall motion due to afib with rapid rates   Perfusion images show areas of reversibility in the inferolateral wall   consistent with ischemia of this territory.   This would be an intermediate to high risk study.    Resting ECG   afib w/ rvr, left axis, LAFB   Resting HR:123 bpm  Resting BP:153/76 mmHg   Peak HR:129 bpm                               Peak BP:123/93 mmHg                           Predicted HR: 147 bpm                         ECG Findings   No change from baseline   EKG data is negative for ischemia    Arrhythmias   Atrial fibrillation with rapid ventricular response unchanged    STRESS ECHO:16   Rhythm: Atrial paced rhythm HR: 64 bpm BP: 114/70 mmHg Conclusions Summary Normal echocardiographic response to stress, but abnormal EKG response. Echo Baseline resting echocardiogram shows normal global LV systolic function with an ejection fraction of 60% and uniform myocardial segmental wall motion.  Following stress there was uniform augmentation of all myocardial segments with appropriate hyperdynamic LV systolic response to stress. ECG Development of nonspecific bundle branch jose luis during exercise at a rate of approximately 100 beats per minute. Resolution during recovery below 100 beats per minute. Arrhythmias Occasional premature ventricular contractions. 4 beats nonsustained ventricular tachycardia during recovery. Symptoms No symptoms with exercise . Echo 2/1/15   Summary -Normal left ventricle size, wall thickness and systolic function with an estimated ejection fraction of 55%. -Mitral annular calcification is present. Mild mitral regurgitation is present. -A porcine aortic valve appears well seated with a maximum gradient of 18 mmHg and a mean gradient of 12 mmHg. Mild aortic regurgitation is present. -Pacer / ICD wire is visualized in the right ventricle. -There is mild to moderate tricuspid regurgitation with RVSP estimated at 40 mmHg. -Mild pulmonic regurgitation present. [facility] 1/11/2019   LM <10% stenosis. LAD Large vessel. Wraps around apex. Prox 10% stenosis. Mid-distal 10% stenosis. LCX Prox-mid 60-70% stenosis. RCA Dominant. Prox-mid 20-30% stenosis. Mid-distal 50-60% stenosis. Overall RCA is small to medium sized vessel.          Past Medical History:   Diagnosis Date    Arthritis     Atrial fibrillation ([address]     CAD (coronary artery disease)     Cardiac pacemaker     Hyperlipidemia     Hypertension     Mitral valve disease     Thyroid disease         Past Surgical History:   Procedure Laterality Date    ATRIAL ABLATION SURGERY  12-28-12    ablation of IVC-Tricuspid Valve    ATRIAL ABLATION SURGERY  11/2019    CARDIAC SURGERY  2009    AVR pig    CYST REMOVAL  1972    DIAGNOSTIC CARDIAC CATH LAB PROCEDURE      PACEMAKER INSERTION      A-fib/flutter    SKIN BIOPSY      VENTRAL HERNIA REPAIR         Allergies   Allergen Reactions    Digoxin Hives       Social History:   reports that he has  Multiple Vitamins-Minerals (MULTIVITAMIN PO) Take 1 tablet by mouth daily.  levothyroxine (LEVOTHROID) 75 MCG tablet Take 75 mcg by mouth daily.  tamsulosin (FLOMAX) 0.4 MG capsule Take 0.4 mg by mouth daily. No current facility-administered medications for this visit. Thank you for allowing me to participate in the care of Ophelia Paz.  Further evaluation will be based upon the patient's clinical course and testing results. All questions and concerns were addressed to the patient/family. Alternatives to my treatment were discussed. I have discussed the above stated plan and the patient verbalized understanding and agreed with the plan. NOTE: This report was transcribed using voice recognition software. Every effort was made to ensure accuracy, however, inadvertent computerized transcription errors may be present. Maria Dolores Dupree MD, MPH  [facility]   Office: [phone]    Scribe attestation: This note was scribed in the presence of Maria Dolores Dupree MD by Uma Guillen RN  Physician Attestation: I, Dr. Maria Dolores Dupree, confirm that the scribe's documentation has been prepared under my direction and personally reviewed by me in its entirety. I also confirm that the note above accurately reflects all work, treatment, procedures, and medical decision making performed by me.

## 2020-11-16 ENCOUNTER — TELEPHONE (OUTPATIENT)
Dept: CARDIOLOGY CLINIC | Age: 75
End: 2020-11-16

## 2020-11-16 LAB — INR BLD: 2.9

## 2020-11-16 PROCEDURE — 93793 ANTICOAG MGMT PT WARFARIN: CPT | Performed by: INTERNAL MEDICINE

## 2020-11-16 NOTE — TELEPHONE ENCOUNTER
Spoke with patient and informed him his INR was within range. Will have Shai Miller call him tomorrow to adjust medications as necessary. He verbalized understanding and will continue medications as previously ordered. Shai Miller, can you assess his INR please?   Thanks

## 2020-11-16 NOTE — PROGRESS NOTES
[facility]   Cardiac Consultation    Referring Provider:  Irene Mccoy. Shivani Peterson MD     Chief Complaint   Patient presents with    6 Month Follow-Up     Cardiac follow up for AVR, PAF    Other     Slight tightness in chest when walking up a grade ( hill)      Subjective:  Patient being seen today for routine cardiology follow up of HTN, HLD, PAF, PPM; no complaints today     Past Medical History:   Mr Terese Brito 75yo male former patient of my partner Dr Debra Munson who retired. Follows with Dr. Doris Wiseman for EP. He has PMH of porcine AVR and VSD closure in 86/53/59 complicated by post-op AV block s/p , PAF/flutter s/p RFA, and NOCAD. He had RFA for aflutter on 12/28/12 and again 2/13/19 by Dr. Doris Wiseman (on coumadin regulated by our office--has home machine). Prior took amiodarone but now d/c'd and hx chronically elevated LFT's on the medication. He was NOT on statin med per Dr. Debra Munson prior due to prior elevated LFT's on amiodarone. He had rash with crestor 10mg qd and does not want statin at this time. His stress ECHO from 06/13/16 was negative for ischemia with an EF of 60%. Most recent ECHO 1/9/19 EF 55% moderate concentric LVH. Grade II DD;  Moderate TAYA; normal bio. AVR; Mild to moderate TR, mild pulm HTN (no change from 12/15). Most recent Stress test 1/9/19 with perfusion images show areas of reversibility in the inferolateral wall c/w ischemia. Subsequently, underwent most recent [facility] 1/11/19 LM <10% LAD prox, mid-distal <10%, Lcx prox-mid 60-70%, RCA prox-mid 20-30% mid distal 50-60%. Moderate LCx/RCA disease correlating with stress test. Medical mgt without PCI recommended. Most recent Cardiac CTA 1/28/19 Mild-to-moderate coronary artery calcification. Most recent ALEJANDRINA 2/13/19 EF 55% concentric LVH no evidence of mass or thrombus in the left atrium or appendage. Most recent EP study ablation 2/13/19 underwent EP study with RFA of atrial fibrillation and pulmonary vein isolation ablation.  Dr. Doris Wiseman stopped amiodarone 11/2019. Hospitalized 9/2020 for SBO managed conservatively with NGT. Most recent EKG 11/10/20 Electronic atrial pacemaker Incomplete RBBB, left axis, LAFB. Most recent device check 11/10/20 All sensing and pacing parameters are within normal range Battery life 5.8 years AP 53%.  <1%. AF with a 6.1% burden. HVR episodes noted. HPI:   Today he reports he is feeling good overall. He reports working out in yard such as pushing wheelbarrow or walking up hill  he will feel mid sternal chest tightness and SOB. He stops and symptoms resolve within 2 minutes. He states nitro causing terrible HA and doesn't take anymore  Denies chest pain, shortness of breath, edema, dizziness, palpitations and syncope. He reports his wife was diagnosed with Breast cancer in May 2020 and this has been very stressful. He reports Dr Shayna Souza increased his Toprol XL from 50 mg to 100 mg last week. He lost over 10 lbs while in hospital and maintained it. Today's weight=175#. Past Medical History:   has a past medical history of Arthritis, Atrial fibrillation ([address] CAD (coronary artery disease), Cardiac pacemaker, Hyperlipidemia, Hypertension, Mitral valve disease, and Thyroid disease. Surgical History:   has a past surgical history that includes Cardiac surgery (2009); cyst removal (1972); skin biopsy; Pacemaker insertion; Atrial ablation surgery (12-28-12); ventral hernia repair; Diagnostic Cardiac Cath Lab Procedure; and Atrial ablation surgery (11/2019). Social History:   , lives with spouse in \[place] [facility] [place]\"". He is retired , and current  [facility] reports that he has never smoked. He has never used smokeless tobacco. He reports that he does not drink alcohol or use drugs. Family History:  family history includes Cancer in his mother. Home Medications:  Prior to Admission medications    Medication Sig Start Date End Date Taking?  Authorizing Provider   metoprolol succinate (TOPROL XL) 100 MG extended release tablet Take 1 tablet by mouth nightly 11/10/20  Yes Allyson Garvey MD   warfarin (COUMADIN) 2.5 MG tablet Two tabs by mouth daily or as directed 10/12/20  Yes Rika Austin MD   diltiazem [facility] CD) 180 MG extended release capsule Take 1 capsule by mouth daily 1/28/20  Yes Rika Austin MD   furosemide (LASIX) 40 MG tablet Take 1 tablet by mouth  daily 12/12/19  Yes Rika Austin MD   aspirin EC 81 MG EC tablet Take 1 tablet by mouth daily 12/6/18  Yes Rika Austin MD   Multiple Vitamins-Minerals (MULTIVITAMIN PO) Take 1 tablet by mouth daily. Yes Historical Provider, MD   levothyroxine (LEVOTHROID) 75 MCG tablet Take 75 mcg by mouth daily. Yes Historical Provider, MD   tamsulosin (FLOMAX) 0.4 MG capsule Take 0.4 mg by mouth daily. Yes Historical Provider, MD      Allergies:  Digoxin     Review of Systems:   · Constitutional: there has been no unanticipated weight loss. There's been no change in energy level, sleep pattern, or activity level. · Eyes: No visual changes or diplopia. No scleral icterus. · ENT: No Headaches, hearing loss or vertigo. No mouth sores or sore throat. · Cardiovascular: Reviewed in HPI  · Respiratory: No cough or wheezing, no sputum production. No hematemesis. · Gastrointestinal: No abdominal pain, appetite loss, blood in stools. No change in bowel or bladder habits. · Genitourinary: No dysuria, trouble voiding, or hematuria. · Musculoskeletal:  No gait disturbance, weakness or joint complaints. · Integumentary: No rash or pruritis. · Neurological: No headache, diplopia, change in muscle strength, numbness or tingling. No change in gait, balance, coordination, mood, affect, memory, mentation, behavior. · Psychiatric: No anxiety, no depression. · Endocrine: No malaise, fatigue or temperature intolerance. No excessive thirst, fluid intake, or urination.  No tremor. · Hematologic/Lymphatic: No abnormal bruising or bleeding, blood clots or swollen lymph nodes. · Allergic/Immunologic: No nasal congestion or hives. Physical Examination:    Vitals:    11/19/20 1332   BP: (!) 106/54   Pulse: 61   Temp:    SpO2: 96%    Height: 5' 10\" (1.778 m), Weight: 175 lb (79.4 kg), BP: (!) 106/54        Wt Readings from Last 3 Encounters:   11/19/20 175 lb (79.4 kg)   11/10/20 176 lb 3.2 oz (79.9 kg)   09/04/20 177 lb 11.2 oz (80.6 kg)       Constitutional and General Appearance: NAD   Respiratory:  · Normal excursion and expansion without use of accessory muscles  · Resp Auscultation: Normal breath sounds without dullness  Cardiovascular:  · The apical impulses not displaced  · Heart tones are crisp and RRR  · Cervical veins are not engorged  · The carotid upstroke is normal in amplitude and contour without delay or bruit  · Normal S1S2, No S3, Soft 2/6 DIXIE  · Peripheral pulses are symmetrical and full  · There is no clubbing, cyanosis of the extremities.   · No edema  · Femoral Arteries: 2+ and equal  · Pedal Pulses: 2+ and equal   Abdomen:  · No masses or tenderness  · Liver/Spleen: No Abnormalities Noted  Neurological/Psychiatric:  · Alert and oriented in all spheres  · Moves all extremities well  · Exhibits normal gait balance and coordination  · No abnormalities of mood, affect, memory, mentation, or behavior are noted  Skin:  · Skin: warm and dry;     Lab Results   Component Value Date    CHOL 203 05/07/2020    CHOL 179 05/06/2019    CHOL 268 03/29/2019     Lab Results   Component Value Date    TRIG 140 05/07/2020    TRIG 71 05/06/2019    TRIG 98 03/29/2019     Lab Results   Component Value Date    HDL 58 05/07/2020    HDL 75 05/06/2019    HDL 71 (A) 03/29/2019     Lab Results   Component Value Date    LDLCALC 90 (A) 05/06/19    LDLCALC 166 (A) 01/11/2018    LDLCALC 168 (H) 03/14/2011     Lab Results   Component Value Date    LABVLDL 30 03/14/2011    VLDL 28 05/07/2020    VLDL 14 05/06/2019    VLDL 20 03/29/2019     Assessment:     1. S/P AVR (aortic valve replacement):  S/P porcine AVR and VSD closure in 12/06. Most recent ECHO 1/9/19 EF 55% moderate concentric LVH. Grade II DD;  Moderate TAYA; normal bio. AVR; Mild to moderate TR, mild pulm HTN (no change from 12/15). 2. Paroxysmal atrial fibrillation ([address]): Follows with EP partner  and is on amiodarone with chronically elevated LFT's. Most recent EP study ablation 2/13/19 underwent EP study with RFA of atrial fibrillation and pulmonary vein isolation ablation by Dr. Cait Pfeiffer and he stopped amiodarone 11/2019. Dr. Cait Pfeiffer increased Toprol XL to 100mg daily recently. 3. Essential hypertension:  Well controlled and will continue current medical regimen. 4. Mixed hyperlipidemia: Prior was NOT on statin med per Dr. Oskar Agarwal due to elevated LFT's on amiodarone. Now with dx moderate CAD and taking low dose crestor. I personally reviewed most recent labs from 5/7/2020. Labs are close to goal but not quite. He had rash and dermatologist felt due to statin. 5.      CAD:  Most recent [facility] 1/11/19 LM <10% LAD prox, mid-distal <10%, Lcx prox-mid 60-70%, RCA prox-mid 20-30% mid distal 50-60%. Moderate LCx/RCA disease correlating with stress test. Medical mgt without PCI recommended. Reports stable angina with heavier exertion only and does NOT want to use SL NTG due to prior HA's. Plan:  1. Meds reviewed. 2 Refills as warranted   2. I discussed restarting statin. I would try Pravastatin low dose to see if he can tolerate but he will consider this and let me know  3. Follow up with me in 9 months     Cost of prescription medications and patient compliance have been reviewed with patient. All questions answered.      Thank you for allowing me to participate in the care of this individual.    This note was scribed in the presence of Mare Hanson MD by Naseem Cardona RN    I, Dr. Eliane Sullivan, personally performed the services described in this documentation, as scribed by the above signed scribe in my presence. It is both accurate and complete to my knowledge. I agree with the details independently gathered by the clinical support staff, while the remaining scribed note accurately describes my personal service to the patient. Александр Hayward.  Jomar Herbert M.D., [facility]

## 2020-11-17 ENCOUNTER — ANTI-COAG VISIT (OUTPATIENT)
Dept: CARDIOLOGY CLINIC | Age: 75
End: 2020-11-17
Payer: MEDICARE

## 2020-11-17 DIAGNOSIS — I48.0 PAROXYSMAL ATRIAL FIBRILLATION (HCC): Chronic | ICD-10-CM

## 2020-11-19 ENCOUNTER — OFFICE VISIT (OUTPATIENT)
Dept: CARDIOLOGY CLINIC | Age: 75
End: 2020-11-19
Payer: MEDICARE

## 2020-11-19 VITALS
BODY MASS INDEX: 25.05 KG/M2 | WEIGHT: 175 LBS | HEART RATE: 61 BPM | DIASTOLIC BLOOD PRESSURE: 54 MMHG | OXYGEN SATURATION: 96 % | HEIGHT: 70 IN | SYSTOLIC BLOOD PRESSURE: 106 MMHG | TEMPERATURE: 96.6 F

## 2020-11-19 PROCEDURE — G8484 FLU IMMUNIZE NO ADMIN: HCPCS | Performed by: INTERNAL MEDICINE

## 2020-11-19 PROCEDURE — G8417 CALC BMI ABV UP PARAM F/U: HCPCS | Performed by: INTERNAL MEDICINE

## 2020-11-19 PROCEDURE — 99214 OFFICE O/P EST MOD 30 MIN: CPT | Performed by: INTERNAL MEDICINE

## 2020-11-19 PROCEDURE — G8427 DOCREV CUR MEDS BY ELIG CLIN: HCPCS | Performed by: INTERNAL MEDICINE

## 2020-11-19 PROCEDURE — 3017F COLORECTAL CA SCREEN DOC REV: CPT | Performed by: INTERNAL MEDICINE

## 2020-11-19 PROCEDURE — 1123F ACP DISCUSS/DSCN MKR DOCD: CPT | Performed by: INTERNAL MEDICINE

## 2020-11-19 PROCEDURE — 1036F TOBACCO NON-USER: CPT | Performed by: INTERNAL MEDICINE

## 2020-11-19 PROCEDURE — 4040F PNEUMOC VAC/ADMIN/RCVD: CPT | Performed by: INTERNAL MEDICINE

## 2020-11-19 RX ORDER — DILTIAZEM HYDROCHLORIDE 180 MG/1
180 CAPSULE, COATED, EXTENDED RELEASE ORAL DAILY
Qty: 90 CAPSULE | Refills: 3 | Status: SHIPPED | OUTPATIENT
Start: 2020-11-19 | End: 2022-02-21

## 2020-11-19 RX ORDER — FUROSEMIDE 40 MG/1
TABLET ORAL
Qty: 90 TABLET | Refills: 3 | Status: SHIPPED | OUTPATIENT
Start: 2020-11-19 | End: 2021-12-27 | Stop reason: SDUPTHER

## 2020-11-30 ENCOUNTER — ANTI-COAG VISIT (OUTPATIENT)
Dept: CARDIOLOGY CLINIC | Age: 75
End: 2020-11-30

## 2020-11-30 LAB — INR BLD: 3.4

## 2020-12-07 ENCOUNTER — ANTI-COAG VISIT (OUTPATIENT)
Dept: CARDIOLOGY CLINIC | Age: 75
End: 2020-12-07

## 2020-12-07 DIAGNOSIS — I48.0 PAROXYSMAL ATRIAL FIBRILLATION (HCC): Chronic | ICD-10-CM

## 2020-12-07 LAB — INR BLD: 3.1

## 2020-12-14 ENCOUNTER — ANTI-COAG VISIT (OUTPATIENT)
Dept: CARDIOLOGY CLINIC | Age: 75
End: 2020-12-14
Payer: MEDICARE

## 2020-12-14 DIAGNOSIS — Z95.2 S/P AVR (AORTIC VALVE REPLACEMENT): Chronic | ICD-10-CM

## 2020-12-14 DIAGNOSIS — I48.0 PAROXYSMAL ATRIAL FIBRILLATION (HCC): Chronic | ICD-10-CM

## 2020-12-14 LAB — INR BLD: 2.7

## 2020-12-14 PROCEDURE — 93793 ANTICOAG MGMT PT WARFARIN: CPT | Performed by: INTERNAL MEDICINE

## 2020-12-21 ENCOUNTER — ANTI-COAG VISIT (OUTPATIENT)
Dept: CARDIOLOGY CLINIC | Age: 75
End: 2020-12-21
Payer: MEDICARE

## 2020-12-21 DIAGNOSIS — I48.0 PAROXYSMAL ATRIAL FIBRILLATION (HCC): Chronic | ICD-10-CM

## 2020-12-21 LAB — INR BLD: 2.8

## 2020-12-21 PROCEDURE — 93793 ANTICOAG MGMT PT WARFARIN: CPT | Performed by: INTERNAL MEDICINE

## 2021-01-05 ENCOUNTER — ANTI-COAG VISIT (OUTPATIENT)
Dept: CARDIOLOGY CLINIC | Age: 76
End: 2021-01-05
Payer: MEDICARE

## 2021-01-05 ENCOUNTER — TELEPHONE (OUTPATIENT)
Dept: CARDIOLOGY CLINIC | Age: 76
End: 2021-01-05

## 2021-01-05 DIAGNOSIS — I48.0 PAROXYSMAL ATRIAL FIBRILLATION (HCC): Chronic | ICD-10-CM

## 2021-01-05 LAB — INR BLD: 1.9

## 2021-01-05 PROCEDURE — 93793 ANTICOAG MGMT PT WARFARIN: CPT | Performed by: INTERNAL MEDICINE

## 2021-01-07 ENCOUNTER — TELEPHONE (OUTPATIENT)
Dept: CARDIOLOGY CLINIC | Age: 76
End: 2021-01-07

## 2021-01-07 NOTE — TELEPHONE ENCOUNTER
Pt would like to know if it is ok to get the covid shot while being on blood thinners? Please call to advise.  Pt has appt at 1:00 to get shot

## 2021-01-08 NOTE — TELEPHONE ENCOUNTER
Yes, OK to have Covid vaccine and if no history of bad reactions to prior vaccines I would recommend that he have it. 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 Only mild arm aching for couple of days.

## 2021-01-18 ENCOUNTER — ANTI-COAG VISIT (OUTPATIENT)
Dept: CARDIOLOGY CLINIC | Age: 76
End: 2021-01-18
Payer: MEDICARE

## 2021-01-18 DIAGNOSIS — Z95.2 S/P AVR (AORTIC VALVE REPLACEMENT): Chronic | ICD-10-CM

## 2021-01-18 LAB — INR BLD: 1.9

## 2021-01-18 PROCEDURE — 93793 ANTICOAG MGMT PT WARFARIN: CPT | Performed by: INTERNAL MEDICINE

## 2021-02-02 ENCOUNTER — TELEPHONE (OUTPATIENT)
Dept: CARDIOLOGY CLINIC | Age: 76
End: 2021-02-02

## 2021-02-02 ENCOUNTER — ANTI-COAG VISIT (OUTPATIENT)
Dept: CARDIOLOGY CLINIC | Age: 76
End: 2021-02-02
Payer: MEDICARE

## 2021-02-02 DIAGNOSIS — Z95.2 S/P AVR (AORTIC VALVE REPLACEMENT): Chronic | ICD-10-CM

## 2021-02-02 LAB — INR BLD: 3.2

## 2021-02-02 PROCEDURE — 93793 ANTICOAG MGMT PT WARFARIN: CPT | Performed by: INTERNAL MEDICINE

## 2021-02-15 LAB — INR BLD: 2.7

## 2021-02-15 PROCEDURE — 93793 ANTICOAG MGMT PT WARFARIN: CPT | Performed by: INTERNAL MEDICINE

## 2021-02-16 ENCOUNTER — ANTI-COAG VISIT (OUTPATIENT)
Dept: CARDIOLOGY CLINIC | Age: 76
End: 2021-02-16
Payer: MEDICARE

## 2021-02-16 DIAGNOSIS — Z95.2 S/P AVR (AORTIC VALVE REPLACEMENT): Chronic | ICD-10-CM

## 2021-02-22 ENCOUNTER — NURSE ONLY (OUTPATIENT)
Dept: CARDIOLOGY CLINIC | Age: 76
End: 2021-02-22
Payer: MEDICARE

## 2021-02-22 DIAGNOSIS — I44.2 ATRIOVENTRICULAR BLOCK, COMPLETE (HCC): Chronic | ICD-10-CM

## 2021-02-22 DIAGNOSIS — Z95.0 PACEMAKER: Primary | ICD-10-CM

## 2021-02-22 PROCEDURE — 93296 REM INTERROG EVL PM/IDS: CPT | Performed by: INTERNAL MEDICINE

## 2021-02-22 PROCEDURE — 93294 REM INTERROG EVL PM/LDLS PM: CPT | Performed by: INTERNAL MEDICINE

## 2021-02-22 NOTE — PROGRESS NOTES
We received remote transmission from patient's monitor at home. Transmission shows normal sensing and pacing function. Noted AF and AT. Pt remains on coumadin. EP physician will review. See interrogation under cardiology tab in the [address] [place] [address] [address] field for more details.

## 2021-02-22 NOTE — LETTER
[facility] [phone]  [address] [place] [address] [address] [phone]    Pacemaker/Defibrillator Clinic          02/22/21        Dante Andersen  [address] [place] [address]        Dear Dante Andersen    This letter is to inform you that we received the transmission from your monitor at home that checks your implanted heart device. The next date your monitor will automatically transmit will be 8-23-21. If your report needs attention we will notify you. Your device and monitor are wireless and most transmit cellularly, but please periodically check your monitor is still plugged in to the electrical outlet. If you still use the telephone land line to send please ensure the connection to the phone vaishali is secure. This will help to ensure successful automatic transmissions in the future. Also, the monitor needs to be close to you while sleeping at night. Please be aware that the remote device transmission sites are periodically monitored only during regular business hours during which simultaneous in-office device clinics are being run. If your transmission requires attention, we will contact you as soon as possible. Thank you.             [facility]

## 2021-03-01 ENCOUNTER — ANTI-COAG VISIT (OUTPATIENT)
Dept: CARDIOLOGY CLINIC | Age: 76
End: 2021-03-01
Payer: MEDICARE

## 2021-03-01 DIAGNOSIS — Z95.2 S/P AVR (AORTIC VALVE REPLACEMENT): Chronic | ICD-10-CM

## 2021-03-01 LAB — INR BLD: 3

## 2021-03-01 PROCEDURE — 93793 ANTICOAG MGMT PT WARFARIN: CPT | Performed by: INTERNAL MEDICINE

## 2021-03-15 ENCOUNTER — ANTI-COAG VISIT (OUTPATIENT)
Dept: CARDIOLOGY CLINIC | Age: 76
End: 2021-03-15

## 2021-03-15 DIAGNOSIS — I48.0 PAROXYSMAL ATRIAL FIBRILLATION (HCC): Chronic | ICD-10-CM

## 2021-03-15 LAB — INR BLD: 3.1

## 2021-03-29 LAB — INR BLD: 3

## 2021-03-31 ENCOUNTER — ANTI-COAG VISIT (OUTPATIENT)
Dept: CARDIOLOGY CLINIC | Age: 76
End: 2021-03-31

## 2021-03-31 DIAGNOSIS — I48.0 PAROXYSMAL ATRIAL FIBRILLATION (HCC): Chronic | ICD-10-CM

## 2021-04-12 ENCOUNTER — TELEPHONE (OUTPATIENT)
Dept: CARDIOLOGY CLINIC | Age: 76
End: 2021-04-12

## 2021-04-12 ENCOUNTER — ANTI-COAG VISIT (OUTPATIENT)
Dept: CARDIOLOGY CLINIC | Age: 76
End: 2021-04-12
Payer: MEDICARE

## 2021-04-12 DIAGNOSIS — Z95.2 S/P AVR (AORTIC VALVE REPLACEMENT): Chronic | ICD-10-CM

## 2021-04-12 LAB — INR BLD: 3.2

## 2021-04-12 PROCEDURE — 93793 ANTICOAG MGMT PT WARFARIN: CPT | Performed by: INTERNAL MEDICINE

## 2021-04-12 NOTE — TELEPHONE ENCOUNTER
LMOVM: INR is 3.2. Per protocol, dosing change is 2.5 mg today then resume normal dosing. Recheck 2 week(s).  Provider notified. ~ Lana Gonzalez RN

## 2021-04-15 NOTE — TELEPHONE ENCOUNTER
Spoke to pt. Per RN KW let pt know that his INR was 3.2. per protocol; dosing change is 2.5 mg today then resume normal dosing. Recheck in 2 weeks.     TY

## 2021-04-26 LAB
INR BLD: 3.2
INR BLD: 3.2

## 2021-04-27 ENCOUNTER — ANTI-COAG VISIT (OUTPATIENT)
Dept: CARDIOLOGY CLINIC | Age: 76
End: 2021-04-27

## 2021-04-27 ENCOUNTER — TELEPHONE (OUTPATIENT)
Dept: CARDIOLOGY CLINIC | Age: 76
End: 2021-04-27

## 2021-04-27 DIAGNOSIS — I48.0 PAROXYSMAL ATRIAL FIBRILLATION (HCC): Chronic | ICD-10-CM

## 2021-04-27 NOTE — TELEPHONE ENCOUNTER
LM for pt. 04/27/21: INR is 3.2. Per protocol, dosing change is 2.5 mg Wednesday, then resume normal dosing. Recheck 1 week(s). Provider notified. ~ Heber Owen RN     Managed by DR Rj Barber  Call with dosing every time. [phone].  Dr. Pittman January wants 2.0-3.0

## 2021-05-05 ENCOUNTER — ANTI-COAG VISIT (OUTPATIENT)
Dept: CARDIOLOGY CLINIC | Age: 76
End: 2021-05-05

## 2021-05-10 RX ORDER — WARFARIN SODIUM 2.5 MG/1
TABLET ORAL
Qty: 180 TABLET | Refills: 1 | Status: SHIPPED | OUTPATIENT
Start: 2021-05-10 | End: 2021-11-19 | Stop reason: SDUPTHER

## 2021-05-10 NOTE — TELEPHONE ENCOUNTER
ALFONSO pt. He is OOT   LOV 11/19/20   He had RFA for aflutter on 12/28/12 and again 2/13/19 by Dr. Joy Jose coumadin regulated by our office--has home machine). last INR 4/26/21 3.2    Plan:  1. Meds reviewed. 2 Refills as warranted   2. I discussed restarting statin. I would try Pravastatin low dose to see if he can tolerate but he will consider this and let me know  3.  Follow up with me in 9 months

## 2021-05-17 LAB — INR BLD: 3.1

## 2021-05-17 PROCEDURE — 93793 ANTICOAG MGMT PT WARFARIN: CPT | Performed by: INTERNAL MEDICINE

## 2021-05-18 ENCOUNTER — ANTI-COAG VISIT (OUTPATIENT)
Dept: CARDIOLOGY CLINIC | Age: 76
End: 2021-05-18
Payer: MEDICARE

## 2021-05-18 DIAGNOSIS — Z95.2 S/P AVR (AORTIC VALVE REPLACEMENT): Chronic | ICD-10-CM

## 2021-05-24 NOTE — PROGRESS NOTES
[address]   Electrophysiology Follow up   Date: 5/25/2021  I had the privilege of visiting Sumaya Davila in the office. CC: PAF  HPI: Sumaya Davila is a 76 y.o. male with a history of permanent pacemaker for temporary AV block, post AVR (porcine), [employer], and closure of VSD in 12/06. He had a radiofrequency catheter ablation for atrial flutter on 12/28/12. He has a history of PAF. Amiodarone was increased from 150 mg to 200 mg daily. He is on coumadin for anticoagulation.     S/p 2/13/19 - Pulmonary vein isolations using wide area circumferential radiofrequency ablation. Additional ablation of CTI right atrial flutter      Hospitalized 9/2020 for SBO and he was off his coumadin because they were planning on surgery. Pam Sanders presents to the office in follow up. He states he may have had one brief episode in the last 6 months. He is feeling much improved being in sinus rhythm. He is compliant on his medication regimen. Continues coumadin for [facility] and denies excessive bruising or any signs of bleeding. Patient denies lightheadedness, dizziness, chest pain, palpitations, orthopnea, edema, presyncope or syncope. He lives in [place] and the [place] office is closer. We will send him back to  as he is closer to him. Assessment and plan:     -Paroxysmal atrial fibrillation     ECG today shows A paced   Device interrogation shows <1% AT/AF burden                S/P 2/13/19 - PVI and CTI right atrial flutter ablation                Discussed treatment options include antiarrhythmic therapy. He was on amiodarone for a long time which was stopped.               If recurrence atrial fibrillation we can consider redo ablation versus amiodarone therapy   Continue Diltiazem 180 mg and Toprol  mg daily for rate control               On coumadin for [facility]              Previously on Amiodarone for several years but was discontinued 11/2019  Bradycardia      - Sinus node dysfunction: S/p pacemaker implantation. I reviewed device interrogation today. Device functions normally. St geoff PPM              The CIED was interrogated and programmed and I supervised and reviewed all the data. All findings and changes are in device interrogation sheet and reflect my personal interpretation and changes and is scanned to Epic. 4.8-7.4 years remaining, AP 77% ,  <1%         -HTN  Not well controlled: 147/72  BP goal <130/80  Home BP monitoring encouraged, printed information provided on how to accurately measure BP at home. Counseled to follow a low salt diet to assure blood pressure remains controlled for cardiovascular risk factor modification. The patient is counseled to get regular exercise 3-5 times per week and maintain a healthy weight reduce cardiovascular risk factors. -S/p St. Geoff bioprosthesis:               ALEJANDRINA with normal functioning valve.      - CAD:               Follow up with Dr. Vinay Sinclair               On statin      He lives closer to [place] office. Follow up with Dr. Sarah Thapa. Patient Active Problem List    Diagnosis Date Noted    Complete intestinal obstruction ([address] 08/30/2020    Small bowel obstruction ([address] 08/30/2020    Typical atrial flutter (HCC)     S/P VSD closure     Chronic anticoagulation 01/10/2019    S/P AVR (aortic valve replacement) 01/20/2015    S/P ablation of atrial flutter 01/20/2015    Ventral hernia 05/13/2014    Cardiac pacemaker in situ 10/11/2011    Atrioventricular block, complete ([address] 10/11/2011    Paroxysmal atrial fibrillation ([address] 10/11/2011    Hypertension 10/11/2011    Hyperlipidemia 10/11/2011    Heart valve replaced by other means 05/03/2011     Diagnostic studies:   ECG 5/25/21  A paced    ALEJANDRINA 2/13/2019   Summary   Left ventricular systolic function is normal with ejection fraction   estimated at 55%. No regional wall motion abnormalities.  There is concentric   left ventricular hypertrophy.    The left atrium is dilated.   Jabier Motto is no evidence of mass or thrombus in the left atrium or appendage.     Nuclear stress:  1/9/2019  Summary   Unable to assess LVEF and wall motion due to afib with rapid rates   Perfusion images show areas of reversibility in the inferolateral wall   consistent with ischemia of this territory.   This would be an intermediate to high risk study.    Resting ECG   afib w/ rvr, left axis, LAFB   Resting HR:123 bpm  Resting BP:153/76 mmHg   Peak HR:129 bpm                               Peak BP:123/93 mmHg                           Predicted HR: 147 bpm                         ECG Findings   No change from baseline   EKG data is negative for ischemia    Arrhythmias   Atrial fibrillation with rapid ventricular response unchanged     STRESS ECHO:6/13/2016   Rhythm: Atrial paced rhythm HR: 64 bpm BP: 114/70 mmHg Conclusions Summary Normal echocardiographic response to stress, but abnormal EKG response. Echo Baseline resting echocardiogram shows normal global LV systolic function with an ejection fraction of 60% and uniform myocardial segmental wall motion. Following stress there was uniform augmentation of all myocardial segments with appropriate hyperdynamic LV systolic response to stress. ECG Development of nonspecific bundle branch jose luis during exercise at a rate of approximately 100 beats per minute. Resolution during recovery below 100 beats per minute. Arrhythmias Occasional premature ventricular contractions. 4 beats nonsustained ventricular tachycardia during recovery. Symptoms No symptoms with exercise .      Echo 2/1/2015   Summary -Normal left ventricle size, wall thickness and systolic function with an estimated ejection fraction of 55%. -Mitral annular calcification is present. Mild mitral regurgitation is present. -A porcine aortic valve appears well seated with a maximum gradient of 18 mmHg and a mean gradient of 12 mmHg. Mild aortic dilTIAZem (CARDIZEM CD) 180 MG extended release capsule Take 1 capsule by mouth daily 11/19/20  Yes Beatris Ring MD   furosemide (LASIX) 40 MG tablet Take 1 tablet by mouth  daily 11/19/20  Yes Beatris Ring MD   metoprolol succinate (TOPROL XL) 100 MG extended release tablet Take 1 tablet by mouth nightly 11/10/20  Yes Alejandra Mendoza MD   aspirin EC 81 MG EC tablet Take 1 tablet by mouth daily 12/6/18  Yes Beatris Ring MD   Multiple Vitamins-Minerals (MULTIVITAMIN PO) Take 1 tablet by mouth daily. Yes Historical Provider, MD   levothyroxine (LEVOTHROID) 75 MCG tablet Take 75 mcg by mouth daily. Yes Historical Provider, MD   tamsulosin (FLOMAX) 0.4 MG capsule Take 0.4 mg by mouth daily. Yes Historical Provider, MD       Allergies   Allergen Reactions    Crestor [Rosuvastatin Calcium] Rash    Digoxin Hives       Social History:  Reviewed. reports that he has never smoked. He has never used smokeless tobacco. He reports that he does not drink alcohol and does not use drugs. Family History:  Reviewed. Reviewed. No family history of SCD. Relevant and available labs, and cardiovascular diagnostics reviewed. Reviewed. I independently reviewed relevant and available cardiac diagnostic tests ECG, CXR, Echo, Stress test, Device interrogation, Holter, CT scan. All questions and concerns were addressed to the patient/family. Alternatives to my treatment were discussed. I have discussed the above stated plan and the patient verbalized understanding and agreed with the plan. Scribe attestation: This note was scribed in the presence of Alejandra Mendoza MD by Edis Quinonez RN  Physician Attestation: I, Dr. Alejandra Mendoza, confirm that the scribe's documentation has been prepared under my direction and personally reviewed by me in its entirety. I also confirm that the note above accurately reflects all work, treatment, procedures, and medical decision making performed by me.       NOTE: This report was transcribed using voice recognition software. Every effort was made to ensure accuracy, however, inadvertent computerized transcription errors may be present.      Woo Villela MD, MPH  [facility]   Office: [phone]  Fax: [phone]

## 2021-05-25 ENCOUNTER — OFFICE VISIT (OUTPATIENT)
Dept: CARDIOLOGY CLINIC | Age: 76
End: 2021-05-25
Payer: MEDICARE

## 2021-05-25 ENCOUNTER — NURSE ONLY (OUTPATIENT)
Dept: CARDIOLOGY CLINIC | Age: 76
End: 2021-05-25
Payer: MEDICARE

## 2021-05-25 VITALS
OXYGEN SATURATION: 97 % | HEIGHT: 70 IN | WEIGHT: 178.2 LBS | SYSTOLIC BLOOD PRESSURE: 138 MMHG | HEART RATE: 60 BPM | DIASTOLIC BLOOD PRESSURE: 76 MMHG | BODY MASS INDEX: 25.51 KG/M2

## 2021-05-25 DIAGNOSIS — Z95.2 S/P AVR (AORTIC VALVE REPLACEMENT): Chronic | ICD-10-CM

## 2021-05-25 DIAGNOSIS — Z87.74 S/P VSD CLOSURE: ICD-10-CM

## 2021-05-25 DIAGNOSIS — I48.0 PAROXYSMAL ATRIAL FIBRILLATION (HCC): Chronic | ICD-10-CM

## 2021-05-25 DIAGNOSIS — I44.2 ATRIOVENTRICULAR BLOCK, COMPLETE (HCC): Chronic | ICD-10-CM

## 2021-05-25 DIAGNOSIS — I48.0 PAROXYSMAL ATRIAL FIBRILLATION (HCC): Primary | Chronic | ICD-10-CM

## 2021-05-25 DIAGNOSIS — Z95.0 CARDIAC PACEMAKER IN SITU: ICD-10-CM

## 2021-05-25 DIAGNOSIS — I10 ESSENTIAL HYPERTENSION: Chronic | ICD-10-CM

## 2021-05-25 DIAGNOSIS — Z95.0 CARDIAC PACEMAKER IN SITU: Chronic | ICD-10-CM

## 2021-05-25 DIAGNOSIS — I48.3 TYPICAL ATRIAL FLUTTER (HCC): ICD-10-CM

## 2021-05-25 PROCEDURE — G8417 CALC BMI ABV UP PARAM F/U: HCPCS | Performed by: INTERNAL MEDICINE

## 2021-05-25 PROCEDURE — 3017F COLORECTAL CA SCREEN DOC REV: CPT | Performed by: INTERNAL MEDICINE

## 2021-05-25 PROCEDURE — 4040F PNEUMOC VAC/ADMIN/RCVD: CPT | Performed by: INTERNAL MEDICINE

## 2021-05-25 PROCEDURE — 1036F TOBACCO NON-USER: CPT | Performed by: INTERNAL MEDICINE

## 2021-05-25 PROCEDURE — 99214 OFFICE O/P EST MOD 30 MIN: CPT | Performed by: INTERNAL MEDICINE

## 2021-05-25 PROCEDURE — G8427 DOCREV CUR MEDS BY ELIG CLIN: HCPCS | Performed by: INTERNAL MEDICINE

## 2021-05-25 PROCEDURE — 1123F ACP DISCUSS/DSCN MKR DOCD: CPT | Performed by: INTERNAL MEDICINE

## 2021-05-26 PROCEDURE — 93280 PM DEVICE PROGR EVAL DUAL: CPT | Performed by: INTERNAL MEDICINE

## 2021-06-01 LAB — INR BLD: 2.6

## 2021-06-01 PROCEDURE — 93793 ANTICOAG MGMT PT WARFARIN: CPT | Performed by: INTERNAL MEDICINE

## 2021-06-02 ENCOUNTER — ANTI-COAG VISIT (OUTPATIENT)
Dept: CARDIOLOGY CLINIC | Age: 76
End: 2021-06-02
Payer: MEDICARE

## 2021-06-02 DIAGNOSIS — Z95.2 S/P AVR (AORTIC VALVE REPLACEMENT): Chronic | ICD-10-CM

## 2021-06-14 LAB — INR BLD: 2.6

## 2021-06-15 ENCOUNTER — ANTI-COAG VISIT (OUTPATIENT)
Dept: CARDIOLOGY CLINIC | Age: 76
End: 2021-06-15
Payer: MEDICARE

## 2021-06-15 DIAGNOSIS — I48.0 PAROXYSMAL ATRIAL FIBRILLATION (HCC): Chronic | ICD-10-CM

## 2021-06-15 PROCEDURE — 93793 ANTICOAG MGMT PT WARFARIN: CPT | Performed by: INTERNAL MEDICINE

## 2021-06-28 ENCOUNTER — ANTI-COAG VISIT (OUTPATIENT)
Dept: CARDIOLOGY CLINIC | Age: 76
End: 2021-06-28

## 2021-06-28 LAB — INR BLD: 2.4

## 2021-07-12 LAB — INR BLD: 2.9

## 2021-07-19 ENCOUNTER — ANTI-COAG VISIT (OUTPATIENT)
Dept: CARDIOLOGY CLINIC | Age: 76
End: 2021-07-19

## 2021-07-26 LAB — INR BLD: 2.9

## 2021-07-28 ENCOUNTER — ANTI-COAG VISIT (OUTPATIENT)
Dept: CARDIOLOGY CLINIC | Age: 76
End: 2021-07-28
Payer: MEDICARE

## 2021-07-28 DIAGNOSIS — I48.0 PAROXYSMAL ATRIAL FIBRILLATION (HCC): Chronic | ICD-10-CM

## 2021-07-28 PROCEDURE — 93793 ANTICOAG MGMT PT WARFARIN: CPT | Performed by: INTERNAL MEDICINE

## 2021-08-09 LAB — INR BLD: 2.6

## 2021-08-10 ENCOUNTER — ANTI-COAG VISIT (OUTPATIENT)
Dept: CARDIOLOGY CLINIC | Age: 76
End: 2021-08-10

## 2021-08-17 NOTE — PROGRESS NOTES
[place] [id number]   Cardiac Evalaution    Referring Provider:  Yuliana Davis. Isela Brewster MD     Chief Complaint   Patient presents with    1 Year Follow Up    Atrial Fibrillation      Subjective:  Patient being seen today for routine cardiology follow up of HTN, HLD, PAF, PPM; no complaints today    Past Medical History:   Mr Shaheen Peck 75yo male who has PMH of porcine AVR and VSD closure in 40/81/97 complicated by post-op AV block s/p , PAF/flutter s/p RFA, and NOCAD. He had RFA for aflutter on 12/28/12 and again 2/13/19 by Dr. Kingsley Zhang (on coumadin regulated by our office--has home machine). Prior took amiodarone but now d/c'd and hx chronically elevated LFT's. He was NOT on statin med per Dr. Kristian Pedraza prior due to prior elevated LFT's on amiodarone. He had rash with crestor and refuses further statins. His stress ECHO from 06/13/16 was negative for ischemia with an EF of 60%. Most recent ECHO 1/9/19 EF 55% moderate concentric LVH. Grade II DD;  Moderate TAYA; normal bio. AVR; Mild to moderate TR, mild pulm HTN (no change from 12/15). Most recent Stress test 1/9/19 with perfusion images show areas of reversibility in the inferolateral wall c/w ischemia. Most recent [facility] 1/11/19 LM <10% LAD prox, mid-distal <10%, Lcx prox-mid 60-70%, RCA prox-mid 20-30% mid distal 50-60%. Moderate LCx/RCA disease. Medical mgt without PCI recommended. Most recent Cardiac CTA 1/28/19 Mild-to-moderate coronary artery calcification. Most recent ALEJANDRINA 2/13/19 EF 55% concentric LVH no evidence of mass or thrombus in the left atrium or appendage. Most recent EP study ablation 2/13/19 underwent EP study with RFA of atrial fibrillation. Dr. Kingsley Zhang stopped amiodarone 11/2019. Hospitalized 9/2020 for SBO managed conservatively with NGT. Note: EKG 11/10/20 Electronic atrial pacemaker Incomplete RBBB, left axis, LAFB. Most recent device check 5/25/21 showed normal function and sensing. AP 77%.  <1%. AT/AT < 1%.   Most recent EKG 5/25/2021 showed Electronic atrial pacemaker Incomplete RBBB; left axis,LAFB (no change from 11/10/2020).;     [place]:   Today he reports doing well. He has started Zetia 10 mg in 11/20 from PCP. He declines statin at this time and does not want to take despite lipids not at goal.  Wife has breast cancer so she is immunosuppressed and they had to cancel their travel plans to [place]. Past Medical History:   has a past medical history of Arthritis, Atrial fibrillation ([address] CAD (coronary artery disease), Cardiac pacemaker, Hyperlipidemia, Hypertension, Mitral valve disease, and Thyroid disease. Surgical History:   has a past surgical history that includes Cardiac surgery (2009); cyst removal (1972); skin biopsy; Pacemaker insertion; Atrial ablation surgery (12-28-12); ventral hernia repair; Diagnostic Cardiac Cath Lab Procedure; and Atrial ablation surgery (11/2019). Social History:   , lives with spouse in [facility]. He is retired , and current  [facility] reports that he has never smoked. He has never used smokeless tobacco. He reports that he does not drink alcohol or use drugs. Family History:  family history includes Cancer in his mother. Home Medications:  Prior to Admission medications    Medication Sig Start Date End Date Taking?  Authorizing Provider   ezetimibe (ZETIA) 10 MG tablet Take 10 mg by mouth daily   Yes Historical Provider, MD   warfarin (COUMADIN) 2.5 MG tablet TAKE TWO TABS BY MOUTH DAILY OR AS DIRECTED  Patient taking differently: Monday, Wed, Fri 5mg  2.5mg all other days 5/10/21  Yes Silas Jackson MD   dilTIAZem (CARDIZEM CD) 180 MG extended release capsule Take 1 capsule by mouth daily 11/19/20  Yes Khai Bejarano MD   furosemide (LASIX) 40 MG tablet Take 1 tablet by mouth  daily 11/19/20  Yes Khai Bejarano MD   metoprolol succinate (TOPROL XL) 100 MG extended release tablet Take 1 tablet by mouth nightly 11/10/20  Yes Loni Mohs, MD   aspirin EC 81 MG EC tablet Take 1 tablet by mouth daily 12/6/18  Yes Panfilo Lock MD   Multiple Vitamins-Minerals (MULTIVITAMIN PO) Take 1 tablet by mouth daily. Yes Historical Provider, MD   levothyroxine (LEVOTHROID) 75 MCG tablet Take 75 mcg by mouth daily. Yes Historical Provider, MD   tamsulosin (FLOMAX) 0.4 MG capsule Take 0.4 mg by mouth daily. Yes Historical Provider, MD      Allergies:  Crestor [rosuvastatin calcium] and Digoxin     Review of Systems:   · Constitutional: there has been no unanticipated weight loss. There's been no change in energy level, sleep pattern, or activity level. · Eyes: No visual changes or diplopia. No scleral icterus. · ENT: No Headaches, hearing loss or vertigo. No mouth sores or sore throat. · Cardiovascular: Reviewed in HPI  · Respiratory: No cough or wheezing, no sputum production. No hematemesis. · Gastrointestinal: No abdominal pain, appetite loss, blood in stools. No change in bowel or bladder habits. · Genitourinary: No dysuria, trouble voiding, or hematuria. · Musculoskeletal:  No gait disturbance, weakness or joint complaints. · Integumentary: No rash or pruritis. · Neurological: No headache, diplopia, change in muscle strength, numbness or tingling. No change in gait, balance, coordination, mood, affect, memory, mentation, behavior. · Psychiatric: No anxiety, no depression. · Endocrine: No malaise, fatigue or temperature intolerance. No excessive thirst, fluid intake, or urination. No tremor. · Hematologic/Lymphatic: No abnormal bruising or bleeding, blood clots or swollen lymph nodes. · Allergic/Immunologic: No nasal congestion or hives.     Physical Examination:    Vitals:    08/20/21 0920   BP: 118/72   Pulse: 94   SpO2: 98%    Height: 5' 10\" (1.778 m), Weight: 179 lb (81.2 kg), BP: 118/72        Wt Readings from Last 3 Encounters:   08/20/21 179 lb (81.2 kg)   05/25/21 178 lb 3.2 oz (80.8 kg)   11/19/20  09/03/2020     Lab Results   Component Value Date    ALKPHOS 81 08/31/2020    ALT 14 08/31/2020    AST 24 08/31/2020    PROT 6.8 08/31/2020    PROT 6.8 10/11/2011    BILITOT 0.9 08/31/2020    BILIDIR <0.2 08/31/2020    LABALBU 3.9 09/03/2020     LABS: 8/12/21 TSH 3.70, H/H 13.6/39.8,K+ 4.5, , BUN/Cr 26/1.02, AST 19, ALT 30, TChol 219, Trig 108, HDL 56, , Vit D 30.2    Assessment:     1. S/P AVR (aortic valve replacement):  S/P porcine AVR and VSD closure in 12/06. Most recent ECHO 1/9/19 EF 55% moderate concentric LVH. Grade II DD;  Moderate TAYA; normal bio. AVR; Mild to moderate TR, mild pulm HTN (no change from 12/15). 2. Paroxysmal atrial fibrillation ([address]): Follows with EP partner  and is on amiodarone with chronically elevated LFT's. Most recent EP study ablation 2/13/19 underwent EP study with RFA of atrial fibrillation and pulmonary vein isolation ablation by Dr. Matilde Smiley. 3. Essential hypertension:  Well controlled and will continue current medical regimen. 4. Mixed hyperlipidemia: Now with dx moderate CAD. I personally reviewed most recent labs from 8/12/21 (see above). He had rash and dermatologist felt due to statin. Labs not at goal but he does NOT want statin and will continue zetia only. 5.      CAD:  Most recent [facility] 1/11/19 LM <10% LAD prox, mid-distal <10%, Lcx prox-mid 60-70%, RCA prox-mid 20-30% mid distal 50-60%. Moderate LCx/RCA disease correlating with stress test. Medical mgt without PCI recommended. Reports stable angina with heavier exertion only and does NOT want to use SL NTG due to prior HA's. Plan:  1. Continue current course. Refills warranted  2. He only wants zetia and no statin for now. Will consider Repatha next OV. 3. Plan to follow up in 9 months    Cost of prescription medications and patient compliance have been reviewed with patient. All questions answered.      Thank you for allowing me to participate in the care of this individual.    This note was scribed in the presence of Ying Paulson MD by Tanya Jolly RN. I, Dr. Ying Paulson, personally performed the services described in this documentation, as scribed by the above signed scribe in my presence. It is both accurate and complete to my knowledge. I agree with the details independently gathered by the clinical support staff, while the remaining scribed note accurately describes my personal service to the patient. Hamida Wandaing.  Peter Travis M.D., [facility] [place]

## 2021-08-20 ENCOUNTER — OFFICE VISIT (OUTPATIENT)
Dept: CARDIOLOGY CLINIC | Age: 76
End: 2021-08-20
Payer: MEDICARE

## 2021-08-20 VITALS
WEIGHT: 179 LBS | OXYGEN SATURATION: 98 % | SYSTOLIC BLOOD PRESSURE: 118 MMHG | HEIGHT: 70 IN | HEART RATE: 94 BPM | DIASTOLIC BLOOD PRESSURE: 72 MMHG | BODY MASS INDEX: 25.62 KG/M2

## 2021-08-20 DIAGNOSIS — I10 ESSENTIAL HYPERTENSION: Chronic | ICD-10-CM

## 2021-08-20 DIAGNOSIS — I44.2 ATRIOVENTRICULAR BLOCK, COMPLETE (HCC): Chronic | ICD-10-CM

## 2021-08-20 DIAGNOSIS — I48.0 PAROXYSMAL ATRIAL FIBRILLATION (HCC): Primary | Chronic | ICD-10-CM

## 2021-08-20 DIAGNOSIS — E78.2 MIXED HYPERLIPIDEMIA: Chronic | ICD-10-CM

## 2021-08-20 PROCEDURE — G8417 CALC BMI ABV UP PARAM F/U: HCPCS | Performed by: INTERNAL MEDICINE

## 2021-08-20 PROCEDURE — 99214 OFFICE O/P EST MOD 30 MIN: CPT | Performed by: INTERNAL MEDICINE

## 2021-08-20 PROCEDURE — G8427 DOCREV CUR MEDS BY ELIG CLIN: HCPCS | Performed by: INTERNAL MEDICINE

## 2021-08-20 PROCEDURE — 1123F ACP DISCUSS/DSCN MKR DOCD: CPT | Performed by: INTERNAL MEDICINE

## 2021-08-20 PROCEDURE — 1036F TOBACCO NON-USER: CPT | Performed by: INTERNAL MEDICINE

## 2021-08-20 PROCEDURE — 4040F PNEUMOC VAC/ADMIN/RCVD: CPT | Performed by: INTERNAL MEDICINE

## 2021-08-20 PROCEDURE — 3017F COLORECTAL CA SCREEN DOC REV: CPT | Performed by: INTERNAL MEDICINE

## 2021-08-20 RX ORDER — METOPROLOL SUCCINATE 100 MG/1
100 TABLET, EXTENDED RELEASE ORAL NIGHTLY
Qty: 90 TABLET | Refills: 3 | Status: SHIPPED | OUTPATIENT
Start: 2021-08-20 | End: 2022-05-18 | Stop reason: SDUPTHER

## 2021-08-20 RX ORDER — EZETIMIBE 10 MG/1
10 TABLET ORAL DAILY
COMMUNITY
End: 2022-05-18 | Stop reason: SDUPTHER

## 2021-08-20 NOTE — LETTER
Frank Lynn  1945        [facility]   Cardiac Evalaution    Referring Provider:  Karo Ralph. Yisel Claudio MD     Chief Complaint   Patient presents with    1 Year Follow Up    Atrial Fibrillation      Subjective:  Patient being seen today for routine cardiology follow up of HTN, HLD, PAF, PPM; no complaints today    Past Medical History:   Mr Ryan Alexandre 77yo male who has PMH of porcine AVR and VSD closure in 51/51/22 complicated by post-op AV block s/p , PAF/flutter s/p RFA, and NOCAD. He had RFA for aflutter on 12/28/12 and again 2/13/19 by Dr. Gentry Noble (on coumadin regulated by our office--has home machine). Prior took amiodarone but now d/c'd and hx chronically elevated LFT's. He was NOT on statin med per Dr. Nicola Fermin prior due to prior elevated LFT's on amiodarone. He had rash with crestor and refuses further statins. His stress ECHO from 06/13/16 was negative for ischemia with an EF of 60%. Most recent ECHO 1/9/19 EF 55% moderate concentric LVH. Grade II DD;  Moderate TAYA; normal bio. AVR; Mild to moderate TR, mild pulm HTN (no change from 12/15). Most recent Stress test 1/9/19 with perfusion images show areas of reversibility in the inferolateral wall c/w ischemia. Most recent [facility] 1/11/19 LM <10% LAD prox, mid-distal <10%, Lcx prox-mid 60-70%, RCA prox-mid 20-30% mid distal 50-60%. Moderate LCx/RCA disease. Medical mgt without PCI recommended. Most recent Cardiac CTA 1/28/19 Mild-to-moderate coronary artery calcification. Most recent ALEJANDRINA 2/13/19 EF 55% concentric LVH no evidence of mass or thrombus in the left atrium or appendage. Most recent EP study ablation 2/13/19 underwent EP study with RFA of atrial fibrillation. Dr. Gentry Noble stopped amiodarone 11/2019. Hospitalized 9/2020 for SBO managed conservatively with NGT. Note: EKG 11/10/20 Electronic atrial pacemaker Incomplete RBBB, left axis, LAFB. Most recent device check 5/25/21 showed normal function and sensing. AP 77%.  <1%. AT/AT < 1%. Most recent EKG 5/25/2021 showed Electronic atrial pacemaker Incomplete RBBB; left axis,LAFB (no change from 11/10/2020).;     [place]:   Today he reports doing well. He has started Zetia 10 mg in 11/20 from PCP. He declines statin at this time and does not want to take despite lipids not at goal.  Wife has breast cancer so she is immunosuppressed and they had to cancel their travel plans to [place]. Past Medical History:   has a past medical history of Arthritis, Atrial fibrillation ([address] CAD (coronary artery disease), Cardiac pacemaker, Hyperlipidemia, Hypertension, Mitral valve disease, and Thyroid disease. Surgical History:   has a past surgical history that includes Cardiac surgery (2009); cyst removal (1972); skin biopsy; Pacemaker insertion; Atrial ablation surgery (12-28-12); ventral hernia repair; Diagnostic Cardiac Cath Lab Procedure; and Atrial ablation surgery (11/2019). Social History:   , lives with spouse in [facility]. He is retired , and current  [facility] reports that he has never smoked. He has never used smokeless tobacco. He reports that he does not drink alcohol or use drugs. Family History:  family history includes Cancer in his mother. Home Medications:  Prior to Admission medications    Medication Sig Start Date End Date Taking?  Authorizing Provider   ezetimibe (ZETIA) 10 MG tablet Take 10 mg by mouth daily   Yes Historical Provider, MD   warfarin (COUMADIN) 2.5 MG tablet TAKE TWO TABS BY MOUTH DAILY OR AS DIRECTED  Patient taking differently: Monday, Wed, Fri 5mg  2.5mg all other days 5/10/21  Yes Rosita Ortega MD   dilTIAZem (CARDIZEM CD) 180 MG extended release capsule Take 1 capsule by mouth daily 11/19/20  Yes Ericka Barry MD   furosemide (LASIX) 40 MG tablet Take 1 tablet by mouth  daily 11/19/20  Yes Ericka Barry MD   metoprolol succinate (TOPROL XL) 100 MG extended release tablet Take 1 tablet by mouth nightly 11/10/20  Yes Latha Bynum MD   aspirin EC 81 MG EC tablet Take 1 tablet by mouth daily 12/6/18  Yes Cesar Yanez MD   Multiple Vitamins-Minerals (MULTIVITAMIN PO) Take 1 tablet by mouth daily. Yes Historical Provider, MD   levothyroxine (LEVOTHROID) 75 MCG tablet Take 75 mcg by mouth daily. Yes Historical Provider, MD   tamsulosin (FLOMAX) 0.4 MG capsule Take 0.4 mg by mouth daily. Yes Historical Provider, MD      Allergies:  Crestor [rosuvastatin calcium] and Digoxin     Review of Systems:   · Constitutional: there has been no unanticipated weight loss. There's been no change in energy level, sleep pattern, or activity level. · Eyes: No visual changes or diplopia. No scleral icterus. · ENT: No Headaches, hearing loss or vertigo. No mouth sores or sore throat. · Cardiovascular: Reviewed in HPI  · Respiratory: No cough or wheezing, no sputum production. No hematemesis. · Gastrointestinal: No abdominal pain, appetite loss, blood in stools. No change in bowel or bladder habits. · Genitourinary: No dysuria, trouble voiding, or hematuria. · Musculoskeletal:  No gait disturbance, weakness or joint complaints. · Integumentary: No rash or pruritis. · Neurological: No headache, diplopia, change in muscle strength, numbness or tingling. No change in gait, balance, coordination, mood, affect, memory, mentation, behavior. · Psychiatric: No anxiety, no depression. · Endocrine: No malaise, fatigue or temperature intolerance. No excessive thirst, fluid intake, or urination. No tremor. · Hematologic/Lymphatic: No abnormal bruising or bleeding, blood clots or swollen lymph nodes. · Allergic/Immunologic: No nasal congestion or hives.     Physical Examination:    Vitals:    08/20/21 0920   BP: 118/72   Pulse: 94   SpO2: 98%    Height: 5' 10\" (1.778 m), Weight: 179 lb (81.2 kg), BP: 118/72        Wt Readings from Last 3 Encounters:   08/20/21 179 lb (81.2 kg)   05/25/21 178 lb 3.2 oz (80.8 kg)   11/19/20 175 lb (79.4 kg)       Constitutional and General Appearance: NAD   Respiratory:  · Normal excursion and expansion without use of accessory muscles  · Resp Auscultation: Normal breath sounds without dullness, clear, no crackles or wheezes  Cardiovascular:  · The apical impulses not displaced  · Heart tones are crisp and RRR with ectopy  · Cervical veins are not engorged  · The carotid upstroke is normal in amplitude and contour without delay or bruit  · Normal S1S2, No S3, Soft 2/6 DIXIE  · Peripheral pulses are symmetrical and full  · There is no clubbing, cyanosis of the extremities.   · No edema  · Femoral Arteries: 2+ and equal  · Pedal Pulses: 2+ and equal   Abdomen:  · No masses or tenderness  · Liver/Spleen: No Abnormalities Noted  Neurological/Psychiatric:  · Alert and oriented in all spheres  · Moves all extremities well  · Exhibits normal gait balance and coordination  · No abnormalities of mood, affect, memory, mentation, or behavior are noted  Skin:  · Skin: warm and dry;     Lab Results   Component Value Date    CHOL 203 05/07/2020    CHOL 179 05/06/2019    CHOL 268 03/29/2019     Lab Results   Component Value Date    TRIG 140 05/07/2020    TRIG 71 05/06/2019    TRIG 98 03/29/2019     Lab Results   Component Value Date    HDL 58 05/07/2020    HDL 75 05/06/2019    HDL 71 (A) 03/29/2019     Lab Results   Component Value Date    LDLCALC 90 (A) 05/06/19    LDLCALC 166 (A) 01/11/2018    LDLCALC 168 (H) 03/14/2011     Lab Results   Component Value Date    LABVLDL 30 03/14/2011    VLDL 28 05/07/2020    VLDL 14 05/06/2019    VLDL 20 03/29/2019     Lab Results   Component Value Date     09/03/2020    K 4.3 09/03/2020    K 3.9 08/31/2020     09/03/2020    CO2 25 09/03/2020    BUN 6 09/03/2020    CREATININE 0.9 09/03/2020    GLUCOSE 121 09/03/2020    CALCIUM 8.9 09/03/2020      Lab Results   Component Value Date    WBC 4.5 09/03/2020    HGB 13.5 09/03/2020    HCT 40.3 (L) 09/03/2020    MCV 95.1 09/03/2020     09/03/2020     Lab Results   Component Value Date    ALKPHOS 81 08/31/2020    ALT 14 08/31/2020    AST 24 08/31/2020    PROT 6.8 08/31/2020    PROT 6.8 10/11/2011    BILITOT 0.9 08/31/2020    BILIDIR <0.2 08/31/2020    LABALBU 3.9 09/03/2020     LABS: 8/12/21 TSH 3.70, H/H 13.6/39.8,K+ 4.5, , BUN/Cr 26/1.02, AST 19, ALT 30, TChol 219, Trig 108, HDL 56, , Vit D 30.2    Assessment:     1. S/P AVR (aortic valve replacement):  S/P porcine AVR and VSD closure in 12/06. Most recent ECHO 1/9/19 EF 55% moderate concentric LVH. Grade II DD;  Moderate TAYA; normal bio. AVR; Mild to moderate TR, mild pulm HTN (no change from 12/15). 2. Paroxysmal atrial fibrillation ([address]): Follows with EP partner  and is on amiodarone with chronically elevated LFT's. Most recent EP study ablation 2/13/19 underwent EP study with RFA of atrial fibrillation and pulmonary vein isolation ablation by Dr. Kingsley Zhang. 3. Essential hypertension:  Well controlled and will continue current medical regimen. 4. Mixed hyperlipidemia: Now with dx moderate CAD. I personally reviewed most recent labs from 8/12/21 (see above). He had rash and dermatologist felt due to statin. Labs not at goal but he does NOT want statin and will continue zetia only. 5.      CAD:  Most recent [facility] 1/11/19 LM <10% LAD prox, mid-distal <10%, Lcx prox-mid 60-70%, RCA prox-mid 20-30% mid distal 50-60%. Moderate LCx/RCA disease correlating with stress test. Medical mgt without PCI recommended. Reports stable angina with heavier exertion only and does NOT want to use SL NTG due to prior HA's. Plan:  1. Continue current course. Refills warranted  2. He only wants zetia and no statin for now. Will consider Repatha next OV. 3. Plan to follow up in 9 months    Cost of prescription medications and patient compliance have been reviewed with patient. All questions answered.      Thank you for allowing me to participate in the care of this individual.    This note was scribed in the presence of Rafita Garcia MD by David Veliz RN. I, Dr. Rafita Garcia, personally performed the services described in this documentation, as scribed by the above signed scribe in my presence. It is both accurate and complete to my knowledge. I agree with the details independently gathered by the clinical support staff, while the remaining scribed note accurately describes my personal service to the patient. Nestor Meléndez.  Clinton Juarez M.D., [facility]

## 2021-08-23 ENCOUNTER — NURSE ONLY (OUTPATIENT)
Dept: CARDIOLOGY CLINIC | Age: 76
End: 2021-08-23
Payer: MEDICARE

## 2021-08-23 DIAGNOSIS — I44.2 ATRIOVENTRICULAR BLOCK, COMPLETE (HCC): Chronic | ICD-10-CM

## 2021-08-23 DIAGNOSIS — Z95.0 PACEMAKER: Primary | ICD-10-CM

## 2021-08-23 PROCEDURE — 93294 REM INTERROG EVL PM/LDLS PM: CPT | Performed by: INTERNAL MEDICINE

## 2021-08-23 PROCEDURE — 93296 REM INTERROG EVL PM/IDS: CPT | Performed by: INTERNAL MEDICINE

## 2021-08-31 ENCOUNTER — ANTI-COAG VISIT (OUTPATIENT)
Dept: CARDIOLOGY CLINIC | Age: 76
End: 2021-08-31

## 2021-08-31 LAB — INR BLD: 3.1

## 2021-09-20 ENCOUNTER — TELEPHONE (OUTPATIENT)
Dept: CARDIOLOGY CLINIC | Age: 76
End: 2021-09-20

## 2021-09-20 ENCOUNTER — ANTI-COAG VISIT (OUTPATIENT)
Dept: CARDIOLOGY CLINIC | Age: 76
End: 2021-09-20
Payer: MEDICARE

## 2021-09-20 DIAGNOSIS — Z95.2 S/P AVR (AORTIC VALVE REPLACEMENT): Chronic | ICD-10-CM

## 2021-09-20 LAB — INR BLD: 3.5

## 2021-09-20 PROCEDURE — 93793 ANTICOAG MGMT PT WARFARIN: CPT | Performed by: INTERNAL MEDICINE

## 2021-09-20 NOTE — TELEPHONE ENCOUNTER
LMOVM INR is 3.5. Per protocol, dosing change is 2.5 mg today then resume 5 mg M,W,F and 2.5 mg all other days. Recheck 1 week(s).  Provider notified. ~ Bobbi Oconnell RN

## 2021-09-27 ENCOUNTER — ANTI-COAG VISIT (OUTPATIENT)
Dept: CARDIOLOGY CLINIC | Age: 76
End: 2021-09-27

## 2021-09-27 LAB — INR BLD: 2.9

## 2021-10-11 LAB — INR BLD: 2.5

## 2021-10-15 ENCOUNTER — ANTI-COAG VISIT (OUTPATIENT)
Dept: CARDIOLOGY CLINIC | Age: 76
End: 2021-10-15

## 2021-11-08 ENCOUNTER — ANTI-COAG VISIT (OUTPATIENT)
Dept: CARDIOLOGY CLINIC | Age: 76
End: 2021-11-08

## 2021-11-08 LAB
INR BLD: 3.2
INR BLD: 3.2

## 2021-11-19 ENCOUNTER — NURSE ONLY (OUTPATIENT)
Dept: CARDIOLOGY CLINIC | Age: 76
End: 2021-11-19
Payer: MEDICARE

## 2021-11-19 ENCOUNTER — OFFICE VISIT (OUTPATIENT)
Dept: CARDIOLOGY CLINIC | Age: 76
End: 2021-11-19
Payer: MEDICARE

## 2021-11-19 VITALS
HEART RATE: 76 BPM | HEIGHT: 70 IN | BODY MASS INDEX: 26.05 KG/M2 | DIASTOLIC BLOOD PRESSURE: 66 MMHG | WEIGHT: 182 LBS | SYSTOLIC BLOOD PRESSURE: 116 MMHG | OXYGEN SATURATION: 97 %

## 2021-11-19 DIAGNOSIS — Z95.0 CARDIAC PACEMAKER IN SITU: ICD-10-CM

## 2021-11-19 DIAGNOSIS — I48.0 PAROXYSMAL ATRIAL FIBRILLATION (HCC): ICD-10-CM

## 2021-11-19 DIAGNOSIS — Z95.0 CARDIAC PACEMAKER IN SITU: Chronic | ICD-10-CM

## 2021-11-19 DIAGNOSIS — I44.2 ATRIOVENTRICULAR BLOCK, COMPLETE (HCC): Chronic | ICD-10-CM

## 2021-11-19 DIAGNOSIS — I48.3 TYPICAL ATRIAL FLUTTER (HCC): ICD-10-CM

## 2021-11-19 DIAGNOSIS — I48.0 PAROXYSMAL ATRIAL FIBRILLATION (HCC): Chronic | ICD-10-CM

## 2021-11-19 DIAGNOSIS — I35.1 AORTIC VALVE INSUFFICIENCY, ETIOLOGY OF CARDIAC VALVE DISEASE UNSPECIFIED: Primary | ICD-10-CM

## 2021-11-19 PROCEDURE — 1036F TOBACCO NON-USER: CPT | Performed by: INTERNAL MEDICINE

## 2021-11-19 PROCEDURE — G8417 CALC BMI ABV UP PARAM F/U: HCPCS | Performed by: INTERNAL MEDICINE

## 2021-11-19 PROCEDURE — 93280 PM DEVICE PROGR EVAL DUAL: CPT | Performed by: INTERNAL MEDICINE

## 2021-11-19 PROCEDURE — 99214 OFFICE O/P EST MOD 30 MIN: CPT | Performed by: INTERNAL MEDICINE

## 2021-11-19 PROCEDURE — G8484 FLU IMMUNIZE NO ADMIN: HCPCS | Performed by: INTERNAL MEDICINE

## 2021-11-19 PROCEDURE — 4040F PNEUMOC VAC/ADMIN/RCVD: CPT | Performed by: INTERNAL MEDICINE

## 2021-11-19 PROCEDURE — 1123F ACP DISCUSS/DSCN MKR DOCD: CPT | Performed by: INTERNAL MEDICINE

## 2021-11-19 PROCEDURE — G8427 DOCREV CUR MEDS BY ELIG CLIN: HCPCS | Performed by: INTERNAL MEDICINE

## 2021-11-19 RX ORDER — WARFARIN SODIUM 2.5 MG/1
TABLET ORAL
Qty: 180 TABLET | Refills: 1 | Status: SHIPPED | OUTPATIENT
Start: 2021-11-19 | End: 2022-01-31

## 2021-11-19 NOTE — PATIENT INSTRUCTIONS
Plan:  1. Continue with remote device checks every 3 months. 2. Continue medications as prescribed. 3. Repeat echo to look at aortic valve. We will call you with results. 4. Follow up in 6 months. Your provider has ordered testing for further evaluation. An order/prescription has been included in your paper work.  To schedule outpatient testing, contact Central Scheduling by calling [address] [place] [address] ([phone]).

## 2021-11-19 NOTE — LETTER
Susie Jones  1945           [facility]   Cardiac Consultation    Date: 11/19/21  Patient Name: Susie Jones  YOB: 1945    Primary Care Physician: Juan Francisco Peralta. Duke Serna MD    CHIEF COMPLAINT:   Chief Complaint   Patient presents with    Follow-up       HPI:  Susie Jones is a 68 y.o. male with a history of permanent pacemaker for temporary AV block, post AVR (porcine), [place], and closure of VSD in 12/06. He had a radiofrequency catheter ablation for atrial flutter on 12/28/12. He has a history of PAF. He underwent pulmonary vein isolation in 2019 and has done well since then. He is on coumadin for anticoagulation. He was following with EP at [facility] office, but is switching back to [facility] office due to location. Device interrogation demonstrated events none, AP 64%,  <1%, AF burden <1%, PARVIN 4-6 years. Today, 11/19/2021, he states that he is doing well and staying active. He states that he is more diaphoretic than his normal. He states that he is taking his medications as prescribed. Patient denies current edema, chest pain, sob, palpitations, dizziness or syncope. Past Medical History:   has a past medical history of Arthritis, Atrial fibrillation ([address] CAD (coronary artery disease), Cardiac pacemaker, Hyperlipidemia, Hypertension, Mitral valve disease, and Thyroid disease. Surgical History:   has a past surgical history that includes Cardiac surgery (2009); cyst removal (1972); skin biopsy; Pacemaker insertion; Atrial ablation surgery (12-28-12); ventral hernia repair; Diagnostic Cardiac Cath Lab Procedure; and Atrial ablation surgery (11/2019). Social History:   reports that he has never smoked. He has never used smokeless tobacco. He reports that he does not drink alcohol and does not use drugs. Family History:  family history includes Cancer in his mother.      Home Medications:  Outpatient Encounter Medications as of 11/19/2021 Medication Sig Dispense Refill    metoprolol succinate (TOPROL XL) 100 MG extended release tablet Take 1 tablet by mouth nightly 90 tablet 3    ezetimibe (ZETIA) 10 MG tablet Take 10 mg by mouth daily      warfarin (COUMADIN) 2.5 MG tablet TAKE TWO TABS BY MOUTH DAILY OR AS DIRECTED (Patient taking differently: Monday, Wed, Fri 5mg  2.5mg all other days) 180 tablet 1    dilTIAZem (CARDIZEM CD) 180 MG extended release capsule Take 1 capsule by mouth daily 90 capsule 3    furosemide (LASIX) 40 MG tablet Take 1 tablet by mouth  daily 90 tablet 3    aspirin EC 81 MG EC tablet Take 1 tablet by mouth daily 90 tablet 3    Multiple Vitamins-Minerals (MULTIVITAMIN PO) Take 1 tablet by mouth daily.  levothyroxine (LEVOTHROID) 75 MCG tablet Take 75 mcg by mouth daily.  tamsulosin (FLOMAX) 0.4 MG capsule Take 0.4 mg by mouth daily. No facility-administered encounter medications on file as of 11/19/2021. DATA:  ECG 11/19/21: Personally reviewed. Device interrogation reviewed and adjusted accordingly 11/19/21    ALEJANDRINA 2/2019      Summary   Left ventricular systolic function is normal with ejection fraction   estimated at 55%. No regional wall motion abnormalities. There is concentric   left ventricular hypertrophy. The left atrium is dilated. There is no evidence of mass or thrombus in the left atrium or appendage. Echo 1/2019  Summary   Left ventricular systolic function is normal with ejection fraction   estimated at 55%. No regional wall motion abnormalities. There is moderate concentric left ventricular hypertrophy. Grade II diastolic dysfunction with elevated left ventricular filling   pressure. Moderate bi-atrial enlargement. A bioprosthetic aortic valve appears well seated with normal doppler   parameters for valve. Mild to moderate tricuspid regurgitation.    Systolic pulmonary artery pressure (SPAP) is estimated at 46 mmHg consistent   with mild pulmonary hypertension (Right atrial pressure of 3 mmHg). Stress test 1/2019  Summary Unable to assess LVEF and wall motion due to afib with rapid rates  Perfusion images show areas of reversibility in the inferolateral wall  consistent with ischemia of this territory. This would be an intermediate to high risk study. Allergies:  Crestor [rosuvastatin calcium] and Digoxin     Review of Systems   Constitutional: Negative. HENT: Negative. Eyes: Negative. Respiratory: Negative. Cardiovascular: Negative. Gastrointestinal: Negative. Genitourinary: Negative. Musculoskeletal: Negative. Skin: Negative. Neurological: Negative. Hematological: Negative. Psychiatric/Behavioral: Negative. /66   Pulse 76   Ht 5' 10\" (1.778 m)   Wt 182 lb (82.6 kg)   SpO2 97%   BMI 26.11 kg/m²       Objective:  Physical Exam   Constitutional: He is oriented to person, place, and time. He appears well-developed and well-nourished. HENT:   Head: Normocephalic and atraumatic. Eyes: Pupils are equal, round, and reactive to light. Neck: Normal range of motion. Cardiovascular: Normal rate, regular rhythm and normal heart sounds, 2/6 AI. Pulmonary/Chest: Effort normal and breath sounds normal.   Abdominal: Soft. No tenderness. Musculoskeletal: Normal range of motion. He exhibits no edema. Neurological: He is alert and oriented to person, place, and time. Skin: Skin is warm and dry. Psychiatric: He has a normal mood and affect. Assessment:  1. Transient CHB  2. PAF-he still has some episodes of AF which are sustained in duration, lasting more than 20 minutes. His overall AF burden however has decreased from about 50% to less than 1%.  3. S/P AVR: porcine  4. S/P VSD repair. 5. Increased AI on exam 11/19/2021. Plan:  1. Continue with remote device checks every 3 months. 2. Continue medications as prescribed. 3. Repeat echo to look at aortic valve. 4. Follow up in 6 months. Your provider has ordered testing for further evaluation. An order/prescription has been included in your paper work.  To schedule outpatient testing, contact Central Scheduling by calling [employer] ([phone]). QUALITY MEASURES  1. Tobacco Cessation Counseling: NA  2. Retake of BP if >140/90:   NA  3. Documentation to PCP/referring for new patient:  Sent to PCP at close of office visit  4. CAD patient on anti-platelet: NA  5. CAD patient on STATIN therapy:  NA  6. Patient with CHF and aFib on anticoagulation:  YES    I, Lary Marley RN, am scribing for and in the presence of Dr. Len Rodríguez. 11/19/21 11:44 AM   Lary Marley RN      I, Dr. Len Rodríguez, personally performed the services described in this documentation as scribed by Lary Marley RN  in my presence, and it is both accurate and complete.           Len Rodríguez M.D.

## 2021-11-19 NOTE — PROGRESS NOTES
[address]   Cardiac Consultation    Date: 11/19/21  Patient Name: Danisha Monroe  YOB: 1945    Primary Care Physician: Dolores Garcia. Debi Us MD    CHIEF COMPLAINT:   Chief Complaint   Patient presents with    Follow-up       HPI:  Danisha Monroe is a 68 y.o. male with a history of permanent pacemaker for temporary AV block, post AVR (porcine), [place], and closure of VSD in 12/06. He had a radiofrequency catheter ablation for atrial flutter on 12/28/12. He has a history of PAF. He underwent pulmonary vein isolation in 2019 and has done well since then. He is on coumadin for anticoagulation. He was following with EP at [facility], but is switching back to [address] [place] [address] due to location. Device interrogation demonstrated events none, AP 64%,  <1%, AF burden <1%, PARVIN 4-6 years. Today, 11/19/2021, he states that he is doing well and staying active. He states that he is more diaphoretic than his normal. He states that he is taking his medications as prescribed. Patient denies current edema, chest pain, sob, palpitations, dizziness or syncope. Past Medical History:   has a past medical history of Arthritis, Atrial fibrillation ([address] CAD (coronary artery disease), Cardiac pacemaker, Hyperlipidemia, Hypertension, Mitral valve disease, and Thyroid disease. Surgical History:   has a past surgical history that includes Cardiac surgery (2009); cyst removal (1972); skin biopsy; Pacemaker insertion; Atrial ablation surgery (12-28-12); ventral hernia repair; Diagnostic Cardiac Cath Lab Procedure; and Atrial ablation surgery (11/2019). Social History:   reports that he has never smoked. He has never used smokeless tobacco. He reports that he does not drink alcohol and does not use drugs. Family History:  family history includes Cancer in his mother.      Home Medications:  Outpatient Encounter Medications as of 11/19/2021   Medication Sig Dispense Refill    metoprolol succinate (TOPROL XL) 100 MG extended release tablet Take 1 tablet by mouth nightly 90 tablet 3    ezetimibe (ZETIA) 10 MG tablet Take 10 mg by mouth daily      warfarin (COUMADIN) 2.5 MG tablet TAKE TWO TABS BY MOUTH DAILY OR AS DIRECTED (Patient taking differently: Monday, Wed, Fri 5mg  2.5mg all other days) 180 tablet 1    dilTIAZem (CARDIZEM CD) 180 MG extended release capsule Take 1 capsule by mouth daily 90 capsule 3    furosemide (LASIX) 40 MG tablet Take 1 tablet by mouth  daily 90 tablet 3    aspirin EC 81 MG EC tablet Take 1 tablet by mouth daily 90 tablet 3    Multiple Vitamins-Minerals (MULTIVITAMIN PO) Take 1 tablet by mouth daily.  levothyroxine (LEVOTHROID) 75 MCG tablet Take 75 mcg by mouth daily.  tamsulosin (FLOMAX) 0.4 MG capsule Take 0.4 mg by mouth daily. No facility-administered encounter medications on file as of 11/19/2021. DATA:  ECG 11/19/21: Personally reviewed. Device interrogation reviewed and adjusted accordingly 11/19/21    ALEJANDRINA 2/2019      Summary   Left ventricular systolic function is normal with ejection fraction   estimated at 55%. No regional wall motion abnormalities. There is concentric   left ventricular hypertrophy. The left atrium is dilated. There is no evidence of mass or thrombus in the left atrium or appendage. Echo 1/2019  Summary   Left ventricular systolic function is normal with ejection fraction   estimated at 55%. No regional wall motion abnormalities. There is moderate concentric left ventricular hypertrophy. Grade II diastolic dysfunction with elevated left ventricular filling   pressure. Moderate bi-atrial enlargement. A bioprosthetic aortic valve appears well seated with normal doppler   parameters for valve. Mild to moderate tricuspid regurgitation.    Systolic pulmonary artery pressure (SPAP) is estimated at 46 mmHg consistent   with mild pulmonary hypertension (Right atrial pressure of 3 mmHg).     Stress test 1/2019  Summary Unable to assess LVEF and wall motion due to afib with rapid rates  Perfusion images show areas of reversibility in the inferolateral wall  consistent with ischemia of this territory. This would be an intermediate to high risk study. Allergies:  Crestor [rosuvastatin calcium] and Digoxin     Review of Systems   Constitutional: Negative. HENT: Negative. Eyes: Negative. Respiratory: Negative. Cardiovascular: Negative. Gastrointestinal: Negative. Genitourinary: Negative. Musculoskeletal: Negative. Skin: Negative. Neurological: Negative. Hematological: Negative. Psychiatric/Behavioral: Negative. /66   Pulse 76   Ht 5' 10\" (1.778 m)   Wt 182 lb (82.6 kg)   SpO2 97%   BMI 26.11 kg/m²       Objective:  Physical Exam   Constitutional: He is oriented to person, place, and time. He appears well-developed and well-nourished. HENT:   Head: Normocephalic and atraumatic. Eyes: Pupils are equal, round, and reactive to light. Neck: Normal range of motion. Cardiovascular: Normal rate, regular rhythm and normal heart sounds, 2/6 AI. Pulmonary/Chest: Effort normal and breath sounds normal.   Abdominal: Soft. No tenderness. Musculoskeletal: Normal range of motion. He exhibits no edema. Neurological: He is alert and oriented to person, place, and time. Skin: Skin is warm and dry. Psychiatric: He has a normal mood and affect. Assessment:  1. Transient CHB  2. PAF-he still has some episodes of AF which are sustained in duration, lasting more than 20 minutes. His overall AF burden however has decreased from about 50% to less than 1%.  3. S/P AVR: porcine  4. S/P VSD repair. 5. Increased AI on exam 11/19/2021. Plan:  1. Continue with remote device checks every 3 months. 2. Continue medications as prescribed. 3. Repeat echo to look at aortic valve. 4. Follow up in 6 months.          Your provider has ordered testing for further evaluation. An order/prescription has been included in your paper work.  To schedule outpatient testing, contact Central Scheduling by calling [employer] ([phone]). QUALITY MEASURES  1. Tobacco Cessation Counseling: NA  2. Retake of BP if >140/90:   NA  3. Documentation to PCP/referring for new patient:  Sent to PCP at close of office visit  4. CAD patient on anti-platelet: NA  5. CAD patient on STATIN therapy:  NA  6. Patient with CHF and aFib on anticoagulation:  YES    I, Buzz Lynn RN, am scribing for and in the presence of Dr. Aleks Gimenez. 11/19/21 11:44 AM   Buzz Lynn RN      I, Dr. Aleks Gimenez, personally performed the services described in this documentation as scribed by Buzz Lynn RN  in my presence, and it is both accurate and complete.           Aleks Gimenez M.D.

## 2021-11-22 ENCOUNTER — NURSE ONLY (OUTPATIENT)
Dept: CARDIOLOGY CLINIC | Age: 76
End: 2021-11-22
Payer: MEDICARE

## 2021-11-22 DIAGNOSIS — Z95.0 PACEMAKER: ICD-10-CM

## 2021-11-22 DIAGNOSIS — I44.2 ATRIOVENTRICULAR BLOCK, COMPLETE (HCC): Chronic | ICD-10-CM

## 2021-11-22 LAB
INR BLD: 3.1
INR BLD: 3.1

## 2021-11-22 NOTE — PROGRESS NOTES
We received remote transmission from patient's monitor at home. Transmission shows normal sensing and pacing function. Noted AF. Pt remains on coumadin. Noted far field sensing. EP physician will review. See interrogation under cardiology tab in the [address] [place] [address] [address] field for more details.

## 2021-11-23 PROCEDURE — 93296 REM INTERROG EVL PM/IDS: CPT | Performed by: INTERNAL MEDICINE

## 2021-11-23 PROCEDURE — 93294 REM INTERROG EVL PM/LDLS PM: CPT | Performed by: INTERNAL MEDICINE

## 2021-11-26 ENCOUNTER — HOSPITAL ENCOUNTER (OUTPATIENT)
Dept: NON INVASIVE DIAGNOSTICS | Age: 76
Discharge: HOME | End: 2021-11-26
Payer: MEDICARE

## 2021-11-26 DIAGNOSIS — I35.1 AORTIC VALVE INSUFFICIENCY, ETIOLOGY OF CARDIAC VALVE DISEASE UNSPECIFIED: ICD-10-CM

## 2021-11-26 LAB
LV EF: 55 %
LVEF MODALITY: NORMAL

## 2021-11-26 PROCEDURE — 93306 TTE W/DOPPLER COMPLETE: CPT

## 2021-12-03 ENCOUNTER — ANTI-COAG VISIT (OUTPATIENT)
Dept: CARDIOLOGY CLINIC | Age: 76
End: 2021-12-03

## 2021-12-06 LAB — INR BLD: 3.2

## 2021-12-08 ENCOUNTER — TELEPHONE (OUTPATIENT)
Dept: CARDIOLOGY CLINIC | Age: 76
End: 2021-12-08

## 2021-12-08 ENCOUNTER — ANTI-COAG VISIT (OUTPATIENT)
Dept: CARDIOLOGY CLINIC | Age: 76
End: 2021-12-08

## 2021-12-08 NOTE — TELEPHONE ENCOUNTER
----- Message from Shaila Martinez MD sent at 12/7/2021  6:10 PM EST -----  LV function is normal. Aortic insufficiency is mild .

## 2021-12-09 NOTE — TELEPHONE ENCOUNTER
Ar Mendez asking for call. Informed patient I am working in the hospital today but will no my best to get back with him if he calls back today.

## 2021-12-20 LAB — INR BLD: 3

## 2021-12-23 ENCOUNTER — ANTI-COAG VISIT (OUTPATIENT)
Dept: CARDIOLOGY CLINIC | Age: 76
End: 2021-12-23

## 2021-12-28 RX ORDER — FUROSEMIDE 40 MG/1
TABLET ORAL
Qty: 90 TABLET | Refills: 1 | Status: SHIPPED | OUTPATIENT
Start: 2021-12-28 | End: 2022-05-18 | Stop reason: SDUPTHER

## 2022-01-03 LAB — INR BLD: 2.9

## 2022-01-07 ENCOUNTER — ANTI-COAG VISIT (OUTPATIENT)
Dept: CARDIOLOGY CLINIC | Age: 77
End: 2022-01-07

## 2022-01-10 ENCOUNTER — ANTI-COAG VISIT (OUTPATIENT)
Dept: CARDIOLOGY CLINIC | Age: 77
End: 2022-01-10

## 2022-01-10 ENCOUNTER — TELEPHONE (OUTPATIENT)
Dept: CARDIOLOGY CLINIC | Age: 77
End: 2022-01-10

## 2022-01-10 LAB — INR BLD: 2.9

## 2022-01-10 NOTE — TELEPHONE ENCOUNTER
Pt was retuning Liset's call regarding INR. Per Guadalupe Govea I advised for pt to continue taking Warfarin as he has been which is 5 mg on Monday Wednesdays and Fridays and 2.5 mg the rest of the week.

## 2022-01-10 NOTE — PROGRESS NOTES
SMM, pt's INR is 2.90 and is currently taking 2.5mg ( 2 tabs qd or as directed).  Please advise, thank you

## 2022-01-17 ENCOUNTER — ANTI-COAG VISIT (OUTPATIENT)
Dept: CARDIOLOGY CLINIC | Age: 77
End: 2022-01-17

## 2022-01-17 LAB — INR BLD: 2.7

## 2022-01-27 DIAGNOSIS — I48.0 PAROXYSMAL ATRIAL FIBRILLATION (HCC): ICD-10-CM

## 2022-01-31 LAB — INR BLD: 3.5

## 2022-01-31 PROCEDURE — 93793 ANTICOAG MGMT PT WARFARIN: CPT | Performed by: NURSE PRACTITIONER

## 2022-01-31 RX ORDER — WARFARIN SODIUM 2.5 MG/1
TABLET ORAL
Qty: 180 TABLET | Refills: 1 | Status: SHIPPED | OUTPATIENT
Start: 2022-01-31

## 2022-02-09 ENCOUNTER — ANTI-COAG VISIT (OUTPATIENT)
Dept: CARDIOLOGY CLINIC | Age: 77
End: 2022-02-09

## 2022-02-10 DIAGNOSIS — Z95.2 S/P AVR (AORTIC VALVE REPLACEMENT): Chronic | ICD-10-CM

## 2022-02-10 DIAGNOSIS — I48.0 PAROXYSMAL ATRIAL FIBRILLATION (HCC): Chronic | ICD-10-CM

## 2022-02-14 LAB — INR BLD: 3.4

## 2022-02-15 ENCOUNTER — ANTI-COAG VISIT (OUTPATIENT)
Dept: CARDIOLOGY CLINIC | Age: 77
End: 2022-02-15

## 2022-02-15 DIAGNOSIS — I48.0 PAROXYSMAL ATRIAL FIBRILLATION (HCC): Chronic | ICD-10-CM

## 2022-02-15 PROCEDURE — 93793 ANTICOAG MGMT PT WARFARIN: CPT | Performed by: NURSE PRACTITIONER

## 2022-02-21 ENCOUNTER — NURSE ONLY (OUTPATIENT)
Dept: CARDIOLOGY CLINIC | Age: 77
End: 2022-02-21
Payer: MEDICARE

## 2022-02-21 DIAGNOSIS — I44.2 ATRIOVENTRICULAR BLOCK, COMPLETE (HCC): ICD-10-CM

## 2022-02-21 DIAGNOSIS — Z95.0 PACEMAKER: ICD-10-CM

## 2022-02-21 DIAGNOSIS — I44.2 ATRIOVENTRICULAR BLOCK, COMPLETE (HCC): Chronic | ICD-10-CM

## 2022-02-21 DIAGNOSIS — I48.0 PAROXYSMAL ATRIAL FIBRILLATION (HCC): Primary | ICD-10-CM

## 2022-02-21 LAB — INR BLD: 2.6

## 2022-02-21 PROCEDURE — 93294 REM INTERROG EVL PM/LDLS PM: CPT | Performed by: INTERNAL MEDICINE

## 2022-02-21 PROCEDURE — 93296 REM INTERROG EVL PM/IDS: CPT | Performed by: INTERNAL MEDICINE

## 2022-02-21 PROCEDURE — 93793 ANTICOAG MGMT PT WARFARIN: CPT | Performed by: NURSE PRACTITIONER

## 2022-02-21 RX ORDER — DILTIAZEM HYDROCHLORIDE 180 MG/1
CAPSULE, COATED, EXTENDED RELEASE ORAL
Qty: 90 CAPSULE | Refills: 3 | Status: SHIPPED | OUTPATIENT
Start: 2022-02-21

## 2022-02-21 NOTE — PROGRESS NOTES
We received remote transmission from patient's monitor at home. Transmission shows normal sensing and pacing function. Noted AF, NSVT and AT. Pt remains on coumadin and Toprol. Noted far field sensing. EP physician will review. See interrogation under cardiology tab in the [facility] [address] field for more details.

## 2022-02-21 NOTE — TELEPHONE ENCOUNTER
LOV  8/20/21  Pt has followup appts with [facility] for 5/4/22 and [facility] at 5/18/22    LABS 9/3/20 RFP, CBC, Magnesium    Left message for pt to have bw completed.

## 2022-02-22 ENCOUNTER — ANTI-COAG VISIT (OUTPATIENT)
Dept: CARDIOLOGY CLINIC | Age: 77
End: 2022-02-22
Payer: MEDICARE

## 2022-02-22 DIAGNOSIS — Z79.01 CHRONIC ANTICOAGULATION: ICD-10-CM

## 2022-03-07 LAB — INR BLD: 2.9

## 2022-03-07 PROCEDURE — 93793 ANTICOAG MGMT PT WARFARIN: CPT | Performed by: NURSE PRACTITIONER

## 2022-03-09 ENCOUNTER — ANTI-COAG VISIT (OUTPATIENT)
Dept: CARDIOLOGY CLINIC | Age: 77
End: 2022-03-09
Payer: MEDICARE

## 2022-03-09 DIAGNOSIS — Z79.01 CHRONIC ANTICOAGULATION: ICD-10-CM

## 2022-03-21 LAB — INR BLD: 2.8

## 2022-03-21 PROCEDURE — 93793 ANTICOAG MGMT PT WARFARIN: CPT | Performed by: NURSE PRACTITIONER

## 2022-03-22 ENCOUNTER — ANTI-COAG VISIT (OUTPATIENT)
Dept: CARDIOLOGY CLINIC | Age: 77
End: 2022-03-22
Payer: MEDICARE

## 2022-03-22 DIAGNOSIS — Z95.2 S/P AVR (AORTIC VALVE REPLACEMENT): Chronic | ICD-10-CM

## 2022-03-22 DIAGNOSIS — Z79.01 CHRONIC ANTICOAGULATION: ICD-10-CM

## 2022-03-22 DIAGNOSIS — I48.0 PAROXYSMAL ATRIAL FIBRILLATION (HCC): Chronic | ICD-10-CM

## 2022-04-04 ENCOUNTER — ANTI-COAG VISIT (OUTPATIENT)
Dept: CARDIOLOGY CLINIC | Age: 77
End: 2022-04-04
Payer: MEDICARE

## 2022-04-04 DIAGNOSIS — Z79.01 CHRONIC ANTICOAGULATION: ICD-10-CM

## 2022-04-04 LAB — INR BLD: 4.4

## 2022-04-04 PROCEDURE — 93793 ANTICOAG MGMT PT WARFARIN: CPT | Performed by: NURSE PRACTITIONER

## 2022-04-04 PROCEDURE — 85610 PROTHROMBIN TIME: CPT | Performed by: NURSE PRACTITIONER

## 2022-04-11 ENCOUNTER — ANTI-COAG VISIT (OUTPATIENT)
Dept: CARDIOLOGY CLINIC | Age: 77
End: 2022-04-11
Payer: MEDICARE

## 2022-04-11 DIAGNOSIS — Z79.01 CHRONIC ANTICOAGULATION: ICD-10-CM

## 2022-04-11 LAB — INR BLD: 2.4

## 2022-04-11 PROCEDURE — 85610 PROTHROMBIN TIME: CPT | Performed by: NURSE PRACTITIONER

## 2022-04-11 PROCEDURE — 93793 ANTICOAG MGMT PT WARFARIN: CPT | Performed by: NURSE PRACTITIONER

## 2022-04-25 LAB — INR BLD: 2.8

## 2022-04-25 PROCEDURE — 93793 ANTICOAG MGMT PT WARFARIN: CPT | Performed by: NURSE PRACTITIONER

## 2022-04-27 ENCOUNTER — ANTI-COAG VISIT (OUTPATIENT)
Dept: CARDIOLOGY CLINIC | Age: 77
End: 2022-04-27
Payer: MEDICARE

## 2022-04-27 DIAGNOSIS — Z79.01 CHRONIC ANTICOAGULATION: ICD-10-CM

## 2022-04-27 PROCEDURE — 85610 PROTHROMBIN TIME: CPT | Performed by: NURSE PRACTITIONER

## 2022-05-04 ENCOUNTER — OFFICE VISIT (OUTPATIENT)
Dept: CARDIOLOGY CLINIC | Age: 77
End: 2022-05-04
Payer: MEDICARE

## 2022-05-04 ENCOUNTER — NURSE ONLY (OUTPATIENT)
Dept: CARDIOLOGY CLINIC | Age: 77
End: 2022-05-04
Payer: MEDICARE

## 2022-05-04 ENCOUNTER — TELEPHONE (OUTPATIENT)
Dept: CARDIOLOGY CLINIC | Age: 77
End: 2022-05-04

## 2022-05-04 VITALS
HEART RATE: 72 BPM | BODY MASS INDEX: 25.84 KG/M2 | WEIGHT: 180.5 LBS | HEIGHT: 70 IN | DIASTOLIC BLOOD PRESSURE: 70 MMHG | OXYGEN SATURATION: 97 % | SYSTOLIC BLOOD PRESSURE: 110 MMHG

## 2022-05-04 DIAGNOSIS — Z86.79 S/P ABLATION OF ATRIAL FLUTTER: Chronic | ICD-10-CM

## 2022-05-04 DIAGNOSIS — I48.3 TYPICAL ATRIAL FLUTTER (HCC): ICD-10-CM

## 2022-05-04 DIAGNOSIS — I44.2 ATRIOVENTRICULAR BLOCK, COMPLETE (HCC): Chronic | ICD-10-CM

## 2022-05-04 DIAGNOSIS — I48.0 PAROXYSMAL ATRIAL FIBRILLATION (HCC): Chronic | ICD-10-CM

## 2022-05-04 DIAGNOSIS — Z95.0 CARDIAC PACEMAKER IN SITU: ICD-10-CM

## 2022-05-04 DIAGNOSIS — I49.3 PVC (PREMATURE VENTRICULAR CONTRACTION): Primary | ICD-10-CM

## 2022-05-04 DIAGNOSIS — I44.2 ATRIOVENTRICULAR BLOCK, COMPLETE (HCC): ICD-10-CM

## 2022-05-04 DIAGNOSIS — I48.0 PAROXYSMAL ATRIAL FIBRILLATION (HCC): ICD-10-CM

## 2022-05-04 DIAGNOSIS — Z98.890 S/P ABLATION OF ATRIAL FLUTTER: Chronic | ICD-10-CM

## 2022-05-04 PROCEDURE — 4040F PNEUMOC VAC/ADMIN/RCVD: CPT | Performed by: INTERNAL MEDICINE

## 2022-05-04 PROCEDURE — 99214 OFFICE O/P EST MOD 30 MIN: CPT | Performed by: INTERNAL MEDICINE

## 2022-05-04 PROCEDURE — G8417 CALC BMI ABV UP PARAM F/U: HCPCS | Performed by: INTERNAL MEDICINE

## 2022-05-04 PROCEDURE — 1036F TOBACCO NON-USER: CPT | Performed by: INTERNAL MEDICINE

## 2022-05-04 PROCEDURE — G8427 DOCREV CUR MEDS BY ELIG CLIN: HCPCS | Performed by: INTERNAL MEDICINE

## 2022-05-04 PROCEDURE — 93280 PM DEVICE PROGR EVAL DUAL: CPT | Performed by: INTERNAL MEDICINE

## 2022-05-04 PROCEDURE — 1123F ACP DISCUSS/DSCN MKR DOCD: CPT | Performed by: INTERNAL MEDICINE

## 2022-05-04 NOTE — TELEPHONE ENCOUNTER
Monitor placed by APRYL Barnett  Monitor [email or web address] vital connect  Length of monitor 24 hr  Monitor ordered by Yony [address] successful prior to pt leaving office?  Yes

## 2022-05-04 NOTE — PROGRESS NOTES
Patient presents to the device clinic today for a programming evaluation for his pacemaker. Patient has a history of CHB, A Flutter, and pAF. Takes coumadin, Cardizem, and Toprol XL. Last device interrogation was on 2/21. Since then, numerous AMS events with overall burden measuring <1%. Frequent PVCs during interrogation with FFOS that falls into Atrial Refractory. 19% PVC burden per device. P wave: 2.9 mV  R wave: 4.0 mV    AP 45%  <1%    All sensing and pacing parameters are within normal range. Increased RV output to keep 2:1. Atrial senstivity was decreased to 1.5mV per Dr. Tiffanie España. Patient will see Dr. Tiffanie España today in office. Patient education was provided about device functionality, in home monitoring, and any other patient questions and/or concerns were addressed. Patient voices understanding. Patient will follow up in 3 months in office or remotely. Please see interrogation for more detail - Paceart report located under the Cardiology tab.

## 2022-05-04 NOTE — PATIENT INSTRUCTIONS
Plan:  1. Remote device checks every 3 months. 2. Continue current medications. 3. Device adjusted in clinic today. 4. Cardiac event monitor for 24 hours to quantify PVC's. 5. Follow up in 6 months.

## 2022-05-04 NOTE — PROGRESS NOTES
[address]   Cardiac Consultation    Date: 5/4/22  Patient Name: Mary Kate Zhang  YOB: 1945    Primary Care Physician: Ahmet Dyson. Dom Ruiz MD    CHIEF COMPLAINT:   Chief Complaint   Patient presents with    6 Month Follow-Up    Device Check    Hypertension    Atrial Fibrillation    Hyperlipidemia    Other     AV block complete, typical atrial flutter        HPI:  Mary Kate Zhang is a 68 y.o. male with a history of permanent pacemaker for temporary AV block, post AVR (porcine), [place], and closure of VSD in 12/06. He had a radiofrequency catheter ablation for atrial flutter on 12/28/12. He has a history of PAF. He underwent pulmonary vein isolation in 2019 and has done well since then. He is on coumadin for anticoagulation. He was following with EP at [place] office, but is switching back to [facility] office due to location. Device interrogation demonstrated events none, AP 64%,  <1%, AF burden <1%, PARVIN 4-6 years. On 11/19/2021, he stated that he is doing well and staying active. His echo on 11/26/2021 demonstrated an EF of 55%. Today, 5/4/2022, device interrogation demonstrated AP 45%,  <1%, events - numerous AMS events, PARVIN 3-4 years. He reports that he is doing well. He feels an occasional skipped beat. This has been going on for years. He reports that he is taking his medications as prescribed. Patient denies current edema, chest pain, dizziness or syncope. Past Medical History:   has a past medical history of Arthritis, Atrial fibrillation ([address] CAD (coronary artery disease), Cardiac pacemaker, Hyperlipidemia, Hypertension, Mitral valve disease, and Thyroid disease. Surgical History:   has a past surgical history that includes Cardiac surgery (2009); cyst removal (1972); skin biopsy; Pacemaker insertion; Atrial ablation surgery (12-28-12); ventral hernia repair; Diagnostic Cardiac Cath Lab Procedure; and Atrial ablation surgery (11/2019).      Social History:   reports that he has never smoked. He has never used smokeless tobacco. He reports that he does not drink alcohol and does not use drugs. Family History:  family history includes Cancer in his mother. Home Medications:  Outpatient Encounter Medications as of 5/4/2022   Medication Sig Dispense Refill    dilTIAZem (CARDIZEM CD) 180 MG extended release capsule TAKE 1 CAPSULE DAILY 90 capsule 3    warfarin (COUMADIN) 2.5 MG tablet TAKE TWO TABS BY MOUTH DAILY OR AS DIRECTED 180 tablet 1    furosemide (LASIX) 40 MG tablet Take 1 tablet by mouth  daily 90 tablet 1    metoprolol succinate (TOPROL XL) 100 MG extended release tablet Take 1 tablet by mouth nightly 90 tablet 3    ezetimibe (ZETIA) 10 MG tablet Take 10 mg by mouth daily      aspirin EC 81 MG EC tablet Take 1 tablet by mouth daily 90 tablet 3    Multiple Vitamins-Minerals (MULTIVITAMIN PO) Take 1 tablet by mouth daily.  levothyroxine (LEVOTHROID) 75 MCG tablet Take 75 mcg by mouth daily.  tamsulosin (FLOMAX) 0.4 MG capsule Take 0.4 mg by mouth daily. No facility-administered encounter medications on file as of 5/4/2022. DATA:  ECG 5/4/22: Personally reviewed. Device interrogation reviewed and adjusted accordingly 5/4/22     Echo 11/2021  Summary   LV systolic function is normal with EF estimated at 55%. No regional wall motion abnormalities. Grade II diastolic dysfunction with elevated LV filling pressure. Mild bi-atrial enlargement. A bioprosthetic artificial aortic valve appears well seated and normal   functioning. Mild-moderate mitral regurgitation. Mild eccentric aortic regurgitation. Mild tricuspid and pulmonic regurgitation. Systolic pulmonary artery pressure (SPAP) is estimated at 36 mmHg (RAP of 3   mmHg). ALEJANDRINA 2/2019      Summary   Left ventricular systolic function is normal with ejection fraction   estimated at 55%. No regional wall motion abnormalities.  There is concentric   left ventricular hypertrophy. The left atrium is dilated. There is no evidence of mass or thrombus in the left atrium or appendage. Echo 1/2019  Summary   Left ventricular systolic function is normal with ejection fraction   estimated at 55%. No regional wall motion abnormalities. There is moderate concentric left ventricular hypertrophy. Grade II diastolic dysfunction with elevated left ventricular filling   pressure. Moderate bi-atrial enlargement. A bioprosthetic aortic valve appears well seated with normal doppler   parameters for valve. Mild to moderate tricuspid regurgitation. Systolic pulmonary artery pressure (SPAP) is estimated at 46 mmHg consistent   with mild pulmonary hypertension (Right atrial pressure of 3 mmHg). Stress test 1/2019  Summary Unable to assess LVEF and wall motion due to afib with rapid rates  Perfusion images show areas of reversibility in the inferolateral wall  consistent with ischemia of this territory. This would be an intermediate to high risk study. Allergies:  Crestor [rosuvastatin calcium] and Digoxin     Review of Systems   Constitutional: Negative. HENT: Negative. Eyes: Negative. Respiratory: Negative. Cardiovascular: Negative. Gastrointestinal: Negative. Genitourinary: Negative. Musculoskeletal: Negative. Skin: Negative. Neurological: Negative. Hematological: Negative. Psychiatric/Behavioral: Negative. /70   Pulse 72   Ht 5' 10\" (1.778 m)   Wt 180 lb 8 oz (81.9 kg)   SpO2 97%   BMI 25.90 kg/m²       Objective:  Physical Exam   Constitutional: He is oriented to person, place, and time. He appears well-developed and well-nourished. HENT:   Head: Normocephalic and atraumatic. Eyes: Pupils are equal, round, and reactive to light. Neck: Normal range of motion. Cardiovascular: Normal rate, regular rhythm and normal heart sounds, 2/6 AI.     Pulmonary/Chest: Effort normal and breath sounds normal. Abdominal: Soft. No tenderness. Musculoskeletal: Normal range of motion. He exhibits no edema. Neurological: He is alert and oriented to person, place, and time. Skin: Skin is warm and dry. Psychiatric: He has a normal mood and affect. Assessment:  1. Transient CHB  2. PAF-he still has some episodes of AF which are sustained in duration, lasting more than 20 minutes. His overall AF burden however has decreased from about 50% to less than 1%.  3. S/P AVR: porcine  4. S/P VSD repair. 5. Increased AI on exam 11/19/2021. Plan:  1. Remote device checks every 3 months. 2. Continue current medications. 3. Device adjusted in clinic today. Decreased RA sensitivity. 4. Cardiac event monitor for 24 hours to quantify PVC's. 5. Follow up in 6 months. QUALITY MEASURES  1. Tobacco Cessation Counseling: NA  2. Retake of BP if >140/90:   NA  3. Documentation to PCP/referring for new patient:  Sent to PCP at close of office visit  4. CAD patient on anti-platelet: NA  5. CAD patient on STATIN therapy:  NA  6. Patient with CHF and aFib on anticoagulation:  YES    I, Shannan Silva RN, am scribing for and in the presence of Dr. Keith Casey. 05/04/22 9:49 AM   APRYL Frazier, Dr. Keith Casey, personally performed the services described in this documentation as scribed by Shannan Silva RN in my presence, and it is both accurate and complete.             Keith Casey M.D.

## 2022-05-09 ENCOUNTER — ANTI-COAG VISIT (OUTPATIENT)
Dept: CARDIOLOGY CLINIC | Age: 77
End: 2022-05-09
Payer: MEDICARE

## 2022-05-09 DIAGNOSIS — I48.0 PAROXYSMAL ATRIAL FIBRILLATION (HCC): Chronic | ICD-10-CM

## 2022-05-09 LAB — INR BLD: 4.1

## 2022-05-09 PROCEDURE — 85610 PROTHROMBIN TIME: CPT | Performed by: NURSE PRACTITIONER

## 2022-05-09 PROCEDURE — 93793 ANTICOAG MGMT PT WARFARIN: CPT | Performed by: NURSE PRACTITIONER

## 2022-05-13 PROCEDURE — 93228 REMOTE 30 DAY ECG REV/REPORT: CPT | Performed by: INTERNAL MEDICINE

## 2022-05-16 ENCOUNTER — ANTI-COAG VISIT (OUTPATIENT)
Dept: CARDIOLOGY CLINIC | Age: 77
End: 2022-05-16
Payer: MEDICARE

## 2022-05-16 DIAGNOSIS — Z79.01 ANTICOAGULATED ON WARFARIN: ICD-10-CM

## 2022-05-16 LAB — INR BLD: 2.9

## 2022-05-16 PROCEDURE — 85610 PROTHROMBIN TIME: CPT | Performed by: NURSE PRACTITIONER

## 2022-05-16 PROCEDURE — 93793 ANTICOAG MGMT PT WARFARIN: CPT | Performed by: NURSE PRACTITIONER

## 2022-05-16 NOTE — PROGRESS NOTES
[address]   Cardiac Follow Up    Referring Provider:  Leroy Muniz. Carmen Vasques MD     Chief Complaint   Patient presents with    Follow-up     6 month office visit    Atrial Fibrillation    Coronary Artery Disease    Other     No new concerns    Other     wore 24 hr cardiac event monitor      Subjective:  Patient being seen today for routine cardiology follow up of HTN, HLD, PAF, PPM; no complaints today    Past Medical History:   Mr Ly Crawford 74yo male who has PMH porcine AVR and VSD closure in 35/27/62 complicated by post-op AV block s/p , PAF/flutter s/p RFA, and NOCAD. He had RFA for aflutter on 12/28/12 and again 2/13/19 by Dr. Raquel Abdalla (on coumadin regulated by our office--has home machine). Prior took amiodarone but now d/c'd and hx chronically elevated LFT's. He was NOT on statin med per Dr. Jody Rosales prior due to prior elevated LFT's on amiodarone. He had rash with crestor and refuses further statins. Most recent Stress test 1/9/19 with perfusion images show areas of reversibility in the inferolateral wall c/w ischemia. Most recent [facility] 1/11/19 LM <10% LAD prox, mid-distal <10%, Lcx prox-mid 60-70%, RCA prox-mid 20-30% mid distal 50-60%. Moderate LCx/RCA disease. Medical mgt without PCI recommended. Most recent Device check 5/4/2022 noted AP 45%;  1%; numerous AMS events (no afib:19% PVC); PARVIN 3-4 yrs. Most recent Echo 11/26/21 EF=55% with grade I DD; artificial aortic valve appears well seated; mild-moderate MR (no change 1/19)  Most recent EKG 5/4/2022 noted A-paced; -RSR(V1) -possible incomplete right bundle branch block and anterior fascicular block. Today, he reports doing well. No clinical complaints. He saw Dr. Dunia Shaw early May and device check with 19% PVC burden and noted afib burden decreased. Adjustments made to device and monitor ordered. His 24 hour monitor is pending at this time. Patient with no complaints of chest pain, SOB, palpitations, dizziness, edema, or orthopnea/PND. He is compliant with medications and tolerates well. Past Medical History:   has a past medical history of Arthritis, Atrial fibrillation ([address] CAD (coronary artery disease), Cardiac pacemaker, Hyperlipidemia, Hypertension, Mitral valve disease, and Thyroid disease. Surgical History:   has a past surgical history that includes Cardiac surgery (2009); cyst removal (1972); skin biopsy; Pacemaker insertion; Atrial ablation surgery (12-28-12); ventral hernia repair; Diagnostic Cardiac Cath Lab Procedure; and Atrial ablation surgery (11/2019). Social History:   , lives with spouse in \[facility] [place] [facility]\"". He is retired , and current  [facility] reports that he has never smoked. He has never used smokeless tobacco. He reports that he does not drink alcohol or use drugs. Family History:  family history includes Cancer in his mother. Home Medications:  Prior to Admission medications    Medication Sig Start Date End Date Taking? Authorizing Provider   dilTIAZem (CARDIZEM CD) 180 MG extended release capsule TAKE 1 CAPSULE DAILY 2/21/22  Yes Yoel Ramirez MD   warfarin (COUMADIN) 2.5 MG tablet TAKE TWO TABS BY MOUTH DAILY OR AS DIRECTED 1/31/22  Yes Jolie James MD   furosemide (LASIX) 40 MG tablet Take 1 tablet by mouth  daily 12/28/21  Yes Yoel Ramirez MD   metoprolol succinate (TOPROL XL) 100 MG extended release tablet Take 1 tablet by mouth nightly 8/20/21  Yes Yoel Ramirez MD   ezetimibe (ZETIA) 10 MG tablet Take 10 mg by mouth daily   Yes Historical Provider, MD   aspirin EC 81 MG EC tablet Take 1 tablet by mouth daily 12/6/18  Yes Yoel Ramirez MD   Multiple Vitamins-Minerals (MULTIVITAMIN PO) Take 1 tablet by mouth daily. Yes Historical Provider, MD   levothyroxine (LEVOTHROID) 75 MCG tablet Take 75 mcg by mouth daily. Yes Historical Provider, MD   tamsulosin (FLOMAX) 0.4 MG capsule Take 0.4 mg by mouth daily.    Yes Historical Provider, MD      Allergies:  Crestor [rosuvastatin calcium] and Digoxin     Review of Systems:   · Constitutional: there has been no unanticipated weight loss. There's been no change in energy level, sleep pattern, or activity level. · Eyes: No visual changes or diplopia. No scleral icterus. · ENT: No Headaches, hearing loss or vertigo. No mouth sores or sore throat. · Cardiovascular: Reviewed in HPI  · Respiratory: No cough or wheezing, no sputum production. No hematemesis. · Gastrointestinal: No abdominal pain, appetite loss, blood in stools. No change in bowel or bladder habits. · Genitourinary: No dysuria, trouble voiding, or hematuria. · Musculoskeletal:  No gait disturbance, weakness or joint complaints. · Integumentary: No rash or pruritis. · Neurological: No headache, diplopia, change in muscle strength, numbness or tingling. No change in gait, balance, coordination, mood, affect, memory, mentation, behavior. · Psychiatric: No anxiety, no depression. · Endocrine: No malaise, fatigue or temperature intolerance. No excessive thirst, fluid intake, or urination. No tremor. · Hematologic/Lymphatic: No abnormal bruising or bleeding, blood clots or swollen lymph nodes. · Allergic/Immunologic: No nasal congestion or hives.     Physical Examination:    Vitals:    05/18/22 1231   BP: 98/80   Pulse: 68   Temp: 98.3 °F (36.8 °C)   SpO2: 98%    Height: 5' 10\" (1.778 m), Weight: 178 lb 6.4 oz (80.9 kg), BP: 98/80        Wt Readings from Last 3 Encounters:   05/18/22 178 lb 6.4 oz (80.9 kg)   05/04/22 180 lb 8 oz (81.9 kg)   11/19/21 182 lb (82.6 kg)       Constitutional and General Appearance: NAD   Respiratory:  · Normal excursion and expansion without use of accessory muscles  · Resp Auscultation: Normal breath sounds without dullness, clear, no crackles or wheezes  Cardiovascular:  · The apical impulses not displaced  · Heart tones are crisp and RRR with ectopy  · Cervical veins are not engorged  · The carotid upstroke is normal in amplitude and contour without delay or bruit  · Normal S1S2, No S3, Soft 2/6 DIXIE  · Peripheral pulses are symmetrical and full  · There is no clubbing, cyanosis of the extremities. · Trace R>L edema  · Femoral Arteries: 2+ and equal  · Pedal Pulses: 2+ and equal   Abdomen:  · No masses or tenderness  · Liver/Spleen: No Abnormalities Noted  Neurological/Psychiatric:  · Alert and oriented in all spheres  · Moves all extremities well  · Exhibits normal gait balance and coordination  · No abnormalities of mood, affect, memory, mentation, or behavior are noted  Skin:  · Skin: warm and dry;       LABS: 8/12/21 TSH 3.70, H/H 13.6/39.8,K+ 4.5, , BUN/Cr 26/1.02, AST 19, ALT 30, TChol 219, Trig 108, HDL 56, , Vit D 30.2    Assessment:     1. S/P AVR (aortic valve replacement):  S/P porcine AVR and VSD closure in 12/06. Most recent Echo 11/26/21 EF=55% with grade I DD; artificial aortic valve appears well seated; mild-moderate MR (no change 1/19)      2. Paroxysmal atrial fibrillation ([address]): Follows with EP partner. Most recent EP study ablation 2/13/19 with RFA of atrial fibrillation by Dr. Shayna Souza. 24 hour monitor ordered by Dr. Tess Angeles and recent device adjustments made. Await results and EP doc's recs. 3. Essential hypertension:  Well controlled and will continue current medical regimen. 4. Mixed hyperlipidemia: I personally reviewed most recent labs from 8/12/21 (see above). He had rash and dermatologist felt due to statin. Labs not at goal but he does NOT want statin and will continue zetia only. 5.      NOCAD:  Most recent C 1/11/19 LM <10% LAD prox, mid-distal <10%, Lcx prox-mid 60-70%, RCA prox-mid 20-30% mid distal 50-60%. Moderate LCx/RCA disease correlating with stress test. Medical mgt without PCI recommended. There are no concerning symptoms for angina currently. He does NOT want to use SL NTG due to prior HA's. Plan:  1. Medications reviewed. Refills warranted. 2. Recommend fasting labs: CBC, FLP, CMP. If LDL still not at goal I will try to get him to agree to Eva Smith but prior did not want. 3. No need for cardiac testing at this time  4. Plan to follow up in 9 months    This note was scribed in the presence of Rika Austin MD by Mick Duane RN. I, Dr. Jl Keene, personally performed the services described in this documentation, as scribed by the above signed scribe in my presence. It is both accurate and complete to my knowledge. I agree with the details independently gathered by the clinical support staff, while the remaining scribed note accurately describes my personal service to the patient. Cost of prescription medications and patient compliance have been reviewed with patient. All questions answered. Thank you for allowing me to participate in the care of this individual.    Forrest Dubin.  Manish Mccormick M.D., [facility] [place]

## 2022-05-18 ENCOUNTER — OFFICE VISIT (OUTPATIENT)
Dept: CARDIOLOGY CLINIC | Age: 77
End: 2022-05-18
Payer: MEDICARE

## 2022-05-18 ENCOUNTER — TELEPHONE (OUTPATIENT)
Dept: CARDIOLOGY CLINIC | Age: 77
End: 2022-05-18

## 2022-05-18 VITALS
WEIGHT: 178.4 LBS | TEMPERATURE: 98.3 F | DIASTOLIC BLOOD PRESSURE: 80 MMHG | BODY MASS INDEX: 25.54 KG/M2 | OXYGEN SATURATION: 98 % | SYSTOLIC BLOOD PRESSURE: 98 MMHG | HEART RATE: 68 BPM | HEIGHT: 70 IN

## 2022-05-18 DIAGNOSIS — E78.2 MIXED HYPERLIPIDEMIA: Primary | ICD-10-CM

## 2022-05-18 DIAGNOSIS — Z95.2 S/P AVR (AORTIC VALVE REPLACEMENT): Chronic | ICD-10-CM

## 2022-05-18 DIAGNOSIS — I48.0 PAROXYSMAL ATRIAL FIBRILLATION (HCC): Chronic | ICD-10-CM

## 2022-05-18 DIAGNOSIS — I10 PRIMARY HYPERTENSION: ICD-10-CM

## 2022-05-18 PROCEDURE — G8417 CALC BMI ABV UP PARAM F/U: HCPCS | Performed by: INTERNAL MEDICINE

## 2022-05-18 PROCEDURE — G8427 DOCREV CUR MEDS BY ELIG CLIN: HCPCS | Performed by: INTERNAL MEDICINE

## 2022-05-18 PROCEDURE — 4040F PNEUMOC VAC/ADMIN/RCVD: CPT | Performed by: INTERNAL MEDICINE

## 2022-05-18 PROCEDURE — 1036F TOBACCO NON-USER: CPT | Performed by: INTERNAL MEDICINE

## 2022-05-18 PROCEDURE — 99214 OFFICE O/P EST MOD 30 MIN: CPT | Performed by: INTERNAL MEDICINE

## 2022-05-18 PROCEDURE — 1123F ACP DISCUSS/DSCN MKR DOCD: CPT | Performed by: INTERNAL MEDICINE

## 2022-05-18 RX ORDER — EZETIMIBE 10 MG/1
10 TABLET ORAL DAILY
Qty: 90 TABLET | Refills: 3 | Status: SHIPPED | OUTPATIENT
Start: 2022-05-18

## 2022-05-18 RX ORDER — FUROSEMIDE 40 MG/1
TABLET ORAL
Qty: 90 TABLET | Refills: 3 | Status: SHIPPED | OUTPATIENT
Start: 2022-05-18

## 2022-05-18 RX ORDER — METOPROLOL SUCCINATE 100 MG/1
100 TABLET, EXTENDED RELEASE ORAL NIGHTLY
Qty: 90 TABLET | Refills: 3 | Status: SHIPPED | OUTPATIENT
Start: 2022-05-18

## 2022-05-18 NOTE — PATIENT INSTRUCTIONS
Plan:  1. Medications reviewed. Refills warranted. 2. Recommend fasting labs: CBC, FLP, CMP  3. No need for cardiac testing at this time  4. Plan to follow up in 6 months      This note was scribed in the presence of Rika Austin MD by Mick Duane RN.

## 2022-05-19 NOTE — TELEPHONE ENCOUNTER
Cardiac event monitor showed average HR of 78 (60-92). NSR. He had a 30% PVC burden. Offered patient June 29th 11:30AM but patient would like to check with wife prior to scheduling. Informed him to call back soon because appt may not be avail for long. V/u. (I can block the spot because it is not a real appt time).      Emailed patient possible appt date/time per his request to [email or web address].

## 2022-05-22 ENCOUNTER — HOSPITAL ENCOUNTER (OUTPATIENT)
Age: 77
Discharge: HOME | End: 2022-05-22
Payer: MEDICARE

## 2022-05-22 DIAGNOSIS — E78.2 MIXED HYPERLIPIDEMIA: ICD-10-CM

## 2022-05-22 DIAGNOSIS — I44.2 ATRIOVENTRICULAR BLOCK, COMPLETE (HCC): ICD-10-CM

## 2022-05-22 DIAGNOSIS — I10 PRIMARY HYPERTENSION: ICD-10-CM

## 2022-05-22 DIAGNOSIS — I48.0 PAROXYSMAL ATRIAL FIBRILLATION (HCC): ICD-10-CM

## 2022-05-22 LAB
A/G RATIO: 1.8 (ref 1.1–2.2)
ALBUMIN SERPL-MCNC: 4.7 G/DL (ref 3.4–5)
ALP BLD-CCNC: 81 U/L (ref 40–129)
ALT SERPL-CCNC: 17 U/L (ref 10–40)
ANION GAP SERPL CALCULATED.3IONS-SCNC: 9 MMOL/L (ref 3–16)
AST SERPL-CCNC: 22 U/L (ref 15–37)
BASOPHILS ABSOLUTE: 0.1 K/UL (ref 0–0.2)
BASOPHILS RELATIVE PERCENT: 2 %
BILIRUB SERPL-MCNC: 0.9 MG/DL (ref 0–1)
BUN BLDV-MCNC: 30 MG/DL (ref 7–20)
CALCIUM SERPL-MCNC: 9.5 MG/DL (ref 8.3–10.6)
CHLORIDE BLD-SCNC: 105 MMOL/L (ref 99–110)
CO2: 28 MMOL/L (ref 21–32)
CREAT SERPL-MCNC: 1 MG/DL (ref 0.8–1.3)
EOSINOPHILS ABSOLUTE: 0.1 K/UL (ref 0–0.6)
EOSINOPHILS RELATIVE PERCENT: 2.9 %
GFR AFRICAN AMERICAN: >60
GFR NON-AFRICAN AMERICAN: >60
GLUCOSE BLD-MCNC: 105 MG/DL (ref 70–99)
HCT VFR BLD CALC: 39.9 % (ref 40.5–52.5)
HEMOGLOBIN: 13.4 G/DL (ref 13.5–17.5)
LYMPHOCYTES ABSOLUTE: 0.5 K/UL (ref 1–5.1)
LYMPHOCYTES RELATIVE PERCENT: 11.8 %
MAGNESIUM: 2.5 MG/DL (ref 1.8–2.4)
MCH RBC QN AUTO: 32 PG (ref 26–34)
MCHC RBC AUTO-ENTMCNC: 33.6 G/DL (ref 31–36)
MCV RBC AUTO: 95.2 FL (ref 80–100)
MONOCYTES ABSOLUTE: 0.4 K/UL (ref 0–1.3)
MONOCYTES RELATIVE PERCENT: 10.3 %
NEUTROPHILS ABSOLUTE: 3 K/UL (ref 1.7–7.7)
NEUTROPHILS RELATIVE PERCENT: 73 %
PDW BLD-RTO: 14.3 % (ref 12.4–15.4)
PHOSPHORUS: 3.3 MG/DL (ref 2.5–4.9)
PLATELET # BLD: 148 K/UL (ref 135–450)
PMV BLD AUTO: 7.4 FL (ref 5–10.5)
POTASSIUM SERPL-SCNC: 4.8 MMOL/L (ref 3.5–5.1)
RBC # BLD: 4.2 M/UL (ref 4.2–5.9)
SODIUM BLD-SCNC: 142 MMOL/L (ref 136–145)
TOTAL PROTEIN: 7.3 G/DL (ref 6.4–8.2)
WBC # BLD: 4.1 K/UL (ref 4–11)

## 2022-05-22 PROCEDURE — 84439 ASSAY OF FREE THYROXINE: CPT

## 2022-05-22 PROCEDURE — 84100 ASSAY OF PHOSPHORUS: CPT

## 2022-05-22 PROCEDURE — 83735 ASSAY OF MAGNESIUM: CPT

## 2022-05-22 PROCEDURE — 80061 LIPID PANEL: CPT

## 2022-05-22 PROCEDURE — 80053 COMPREHEN METABOLIC PANEL: CPT

## 2022-05-22 PROCEDURE — 84443 ASSAY THYROID STIM HORMONE: CPT

## 2022-05-22 PROCEDURE — 36415 COLL VENOUS BLD VENIPUNCTURE: CPT

## 2022-05-22 PROCEDURE — 85025 COMPLETE CBC W/AUTO DIFF WBC: CPT

## 2022-05-23 ENCOUNTER — NURSE ONLY (OUTPATIENT)
Dept: CARDIOLOGY CLINIC | Age: 77
End: 2022-05-23
Payer: MEDICARE

## 2022-05-23 DIAGNOSIS — I44.2 ATRIOVENTRICULAR BLOCK, COMPLETE (HCC): Chronic | ICD-10-CM

## 2022-05-23 DIAGNOSIS — Z95.0 PACEMAKER: ICD-10-CM

## 2022-05-23 LAB
CHOLESTEROL, TOTAL: 198 MG/DL (ref 0–199)
HDLC SERPL-MCNC: 54 MG/DL (ref 40–60)
LDL CHOLESTEROL CALCULATED: 124 MG/DL
T4 FREE: 1.4 NG/DL (ref 0.9–1.8)
TRIGL SERPL-MCNC: 99 MG/DL (ref 0–150)
TSH REFLEX: 6.17 UIU/ML (ref 0.27–4.2)
VLDLC SERPL CALC-MCNC: 20 MG/DL

## 2022-05-24 PROCEDURE — 93296 REM INTERROG EVL PM/IDS: CPT | Performed by: INTERNAL MEDICINE

## 2022-05-24 PROCEDURE — 93294 REM INTERROG EVL PM/LDLS PM: CPT | Performed by: INTERNAL MEDICINE

## 2022-05-24 NOTE — PROGRESS NOTES
We received remote transmission from patient's monitor at home. Transmission shows normal sensing and pacing function. Noted AF and AT. Pt remains on coumadin and Toprol. Noted far field sensing. EP physician will review. See interrogation under cardiology tab in the [address] [place] [address] [address] field for more details.

## 2022-05-31 ENCOUNTER — TELEPHONE (OUTPATIENT)
Dept: CARDIOLOGY CLINIC | Age: 77
End: 2022-05-31

## 2022-05-31 LAB — INR BLD: 3.4

## 2022-05-31 PROCEDURE — 93793 ANTICOAG MGMT PT WARFARIN: CPT | Performed by: NURSE PRACTITIONER

## 2022-05-31 NOTE — TELEPHONE ENCOUNTER
Pt stated that he would like to speak with Sandra about what is going on with his coumadin for the week. Pt would not relay anymore details. Pt can be reached at [phone].

## 2022-06-01 ENCOUNTER — ANTI-COAG VISIT (OUTPATIENT)
Dept: CARDIOLOGY CLINIC | Age: 77
End: 2022-06-01
Payer: MEDICARE

## 2022-06-01 DIAGNOSIS — Z79.01 CHRONIC ANTICOAGULATION: ICD-10-CM

## 2022-06-01 PROCEDURE — 85610 PROTHROMBIN TIME: CPT | Performed by: NURSE PRACTITIONER

## 2022-06-02 ENCOUNTER — OFFICE VISIT (OUTPATIENT)
Dept: PRIMARY CARE CLINIC | Age: 77
End: 2022-06-02
Payer: MEDICARE

## 2022-06-02 VITALS
OXYGEN SATURATION: 98 % | BODY MASS INDEX: 25.51 KG/M2 | HEIGHT: 70 IN | HEART RATE: 46 BPM | DIASTOLIC BLOOD PRESSURE: 60 MMHG | SYSTOLIC BLOOD PRESSURE: 124 MMHG | RESPIRATION RATE: 16 BRPM | WEIGHT: 178.2 LBS

## 2022-06-02 DIAGNOSIS — Z95.2 S/P AVR (AORTIC VALVE REPLACEMENT): Chronic | ICD-10-CM

## 2022-06-02 DIAGNOSIS — I44.2 ATRIOVENTRICULAR BLOCK, COMPLETE (HCC): Chronic | ICD-10-CM

## 2022-06-02 DIAGNOSIS — I48.0 PAROXYSMAL ATRIAL FIBRILLATION (HCC): Chronic | ICD-10-CM

## 2022-06-02 DIAGNOSIS — N40.1 BENIGN PROSTATIC HYPERPLASIA WITH LOWER URINARY TRACT SYMPTOMS, SYMPTOM DETAILS UNSPECIFIED: ICD-10-CM

## 2022-06-02 DIAGNOSIS — E03.9 ACQUIRED HYPOTHYROIDISM: Primary | ICD-10-CM

## 2022-06-02 DIAGNOSIS — E78.2 MIXED HYPERLIPIDEMIA: Chronic | ICD-10-CM

## 2022-06-02 DIAGNOSIS — I25.10 NONOBSTRUCTIVE ATHEROSCLEROSIS OF CORONARY ARTERY: ICD-10-CM

## 2022-06-02 DIAGNOSIS — I10 PRIMARY HYPERTENSION: Chronic | ICD-10-CM

## 2022-06-02 PROBLEM — K56.601 COMPLETE INTESTINAL OBSTRUCTION (HCC): Status: RESOLVED | Noted: 2020-08-30 | Resolved: 2022-06-02

## 2022-06-02 PROBLEM — K56.609 SMALL BOWEL OBSTRUCTION (HCC): Status: RESOLVED | Noted: 2020-08-30 | Resolved: 2022-06-02

## 2022-06-02 PROCEDURE — 1036F TOBACCO NON-USER: CPT | Performed by: INTERNAL MEDICINE

## 2022-06-02 PROCEDURE — 1123F ACP DISCUSS/DSCN MKR DOCD: CPT | Performed by: INTERNAL MEDICINE

## 2022-06-02 PROCEDURE — G8427 DOCREV CUR MEDS BY ELIG CLIN: HCPCS | Performed by: INTERNAL MEDICINE

## 2022-06-02 PROCEDURE — 99214 OFFICE O/P EST MOD 30 MIN: CPT | Performed by: INTERNAL MEDICINE

## 2022-06-02 PROCEDURE — G8417 CALC BMI ABV UP PARAM F/U: HCPCS | Performed by: INTERNAL MEDICINE

## 2022-06-02 RX ORDER — LEVOTHYROXINE SODIUM 88 UG/1
88 TABLET ORAL DAILY
Qty: 90 TABLET | Refills: 1 | Status: SHIPPED | OUTPATIENT
Start: 2022-06-02

## 2022-06-02 SDOH — ECONOMIC STABILITY: FOOD INSECURITY: WITHIN THE PAST 12 MONTHS, YOU WORRIED THAT YOUR FOOD WOULD RUN OUT BEFORE YOU GOT MONEY TO BUY MORE.: NEVER TRUE

## 2022-06-02 SDOH — ECONOMIC STABILITY: FOOD INSECURITY: WITHIN THE PAST 12 MONTHS, THE FOOD YOU BOUGHT JUST DIDN'T LAST AND YOU DIDN'T HAVE MONEY TO GET MORE.: NEVER TRUE

## 2022-06-02 ASSESSMENT — ENCOUNTER SYMPTOMS
ABDOMINAL DISTENTION: 0
EYE PAIN: 0
CONSTIPATION: 0
ABDOMINAL PAIN: 0
DIARRHEA: 0
TROUBLE SWALLOWING: 0
BACK PAIN: 0
COLOR CHANGE: 0
VOMITING: 0
SORE THROAT: 0
SINUS PRESSURE: 0
NAUSEA: 0
WHEEZING: 0
EYE REDNESS: 0
SHORTNESS OF BREATH: 0
COUGH: 0
CHEST TIGHTNESS: 0
BLOOD IN STOOL: 0

## 2022-06-02 ASSESSMENT — PATIENT HEALTH QUESTIONNAIRE - PHQ9
SUM OF ALL RESPONSES TO PHQ9 QUESTIONS 1 & 2: 0
SUM OF ALL RESPONSES TO PHQ QUESTIONS 1-9: 0
2. FEELING DOWN, DEPRESSED OR HOPELESS: 0
1. LITTLE INTEREST OR PLEASURE IN DOING THINGS: 0
SUM OF ALL RESPONSES TO PHQ QUESTIONS 1-9: 0

## 2022-06-02 ASSESSMENT — SOCIAL DETERMINANTS OF HEALTH (SDOH): HOW HARD IS IT FOR YOU TO PAY FOR THE VERY BASICS LIKE FOOD, HOUSING, MEDICAL CARE, AND HEATING?: NOT HARD AT ALL

## 2022-06-02 NOTE — ASSESSMENT & PLAN NOTE
Stable on diltiazem 180 mg ER, metoprolol 100 mg nightly, warfarin  Follow-up with Cardiologist as per the orders

## 2022-06-02 NOTE — PROGRESS NOTES
Tequila Baldwin (1945) is a 68 y.o. male   Follow-up, Hypertension, and Hyperlipidemia     General health: This patient presents for check up and refills. The problem and medicine lists and chart were reviewed in detail. The patient has been worked up and treated for this/these condition/s and is compliant with taking the medication without any significant side effects. The patient's condition/s is/are chronic and unchanged and otherwise remains stable. Feels well with minor complaints. Main Problem Review -hypothyroidism -patient comes in for review,  of recently ordered thyroid labs. Patient has been adherent with his levothyroxine. Patient denies any symptoms of hypothyroidism no constipation, no hair loss, no other issues of concern. Other problems review    1. Paroxysmal A. fib/s/p AV R/history of nonobstructive CAD/history of AV block -patient has no complaints. Denies chest pain, shortness of breath, palpitations with exertion or at rest.  Patient has a pacemaker in situ, on Coumadin. Says he is adherent with all his heart medicines. Recently followed up with his cardiologist    2. HTN - Patient blood pressure is stable today. Patient denies complaints or symptoms today. Patient mentions he is adherent with treatment. HThe 10-year ASCVD risk score (Jasvir Stroud, et al., 2013) is: 28%    Values used to calculate the score:      Age: 68 years      Sex: Male      Is Non- : No      Diabetic: No      Tobacco smoker: No      Systolic Blood Pressure: 104 mmHg      Is BP treated: Yes      HDL Cholesterol: 54 mg/dL      Total Cholesterol: 198 mg/dL     Review of Systems   Constitutional: Negative for activity change, appetite change, chills, fatigue, fever and unexpected weight change. HENT: Negative for congestion, ear pain, postnasal drip, sinus pressure, sore throat, tinnitus and trouble swallowing. Eyes: Negative for pain and redness.    Respiratory: Negative for cough, chest tightness, shortness of breath and wheezing. Cardiovascular: Negative for chest pain, palpitations and leg swelling. Gastrointestinal: Negative for abdominal distention, abdominal pain, blood in stool, constipation, diarrhea, nausea and vomiting. Endocrine: Negative for cold intolerance, heat intolerance and polydipsia. Genitourinary: Negative for decreased urine volume, dysuria, flank pain, frequency, hematuria and urgency. Musculoskeletal: Negative for arthralgias, back pain, joint swelling, neck pain and neck stiffness. Skin: Negative for color change and rash. Neurological: Negative for dizziness, weakness, numbness and headaches. Hematological: Negative for adenopathy. Psychiatric/Behavioral: Negative for behavioral problems, sleep disturbance and suicidal ideas. The patient is not nervous/anxious. /60 (Site: Left Upper Arm, Position: Sitting, Cuff Size: Large Adult)   Pulse (!) 46   Resp 16   Ht 5' 10\" (1.778 m)   Wt 178 lb 3.2 oz (80.8 kg)   SpO2 98%   BMI 25.57 kg/m²    Physical Exam  Constitutional:       General: He is not in acute distress. Appearance: Normal appearance. He is not ill-appearing. HENT:      Head: Normocephalic and atraumatic. Right Ear: Tympanic membrane, ear canal and external ear normal. There is no impacted cerumen. Left Ear: Tympanic membrane, ear canal and external ear normal. There is no impacted cerumen. Mouth/Throat:      Mouth: Mucous membranes are moist.      Pharynx: No oropharyngeal exudate or posterior oropharyngeal erythema. Eyes:      Extraocular Movements: Extraocular movements intact. Conjunctiva/sclera: Conjunctivae normal.      Pupils: Pupils are equal, round, and reactive to light. Neck:      Vascular: No carotid bruit. Cardiovascular:      Rate and Rhythm: Normal rate and regular rhythm. Pulses: Normal pulses. Heart sounds: Normal heart sounds. No murmur heard. No gallop. Pulmonary:      Effort: Pulmonary effort is normal.      Breath sounds: Normal breath sounds. No wheezing, rhonchi or rales. Abdominal:      General: Abdomen is flat. Bowel sounds are normal. There is no distension. Palpations: Abdomen is soft. Tenderness: There is no abdominal tenderness. There is no guarding or rebound. Musculoskeletal:         General: No swelling or tenderness. Normal range of motion. Cervical back: No tenderness. Lymphadenopathy:      Cervical: No cervical adenopathy. Skin:     Findings: No erythema, lesion or rash. Neurological:      General: No focal deficit present. Mental Status: He is alert and oriented to person, place, and time. Mental status is at baseline. Cranial Nerves: No cranial nerve deficit. Motor: No weakness. Psychiatric:         Mood and Affect: Mood normal.         Behavior: Behavior normal.         Thought Content: Thought content normal.         Judgment: Judgment normal.         ASSESSMENT/PLAN:  1. Acquired hypothyroidism  Assessment & Plan:   Recent TSH 6.17, will increase levothyroxine to 88 mcg daily  Orders:  -     levothyroxine (SYNTHROID) 88 MCG tablet; Take 1 tablet by mouth daily, Disp-90 tablet, R-1Print  2. Nonobstructive atherosclerosis of coronary artery  Assessment & Plan:   Stable and controlled on metoprolol succinate 100 mg nightly, aspirin 81 mg daily  Follow-up with cardiologist as per the orders  3. Paroxysmal atrial fibrillation (HCC)  Assessment & Plan:   Stable on diltiazem 180 mg ER, metoprolol 100 mg nightly, warfarin  Follow-up with Cardiologist as per the orders  4. S/P AVR (aortic valve replacement)  Assessment & Plan:   Stable on warfarin  Follow-up with cardiologist as per the orders  5. Atrioventricular block, complete [facility])  Assessment & Plan:  Stable  Pacemaker in situ, patient follows with EP cardiologist  6.  Primary hypertension  Assessment & Plan:   Stable and controlled on diltiazem 180 mg extended release daily, metoprolol succinate 100 mg nightly  To verify blood pressure cuff accuracy  To keep outpatient BP log,  counseled on exercise and diet (including DASH diet)  Patient agreed with plan with verbal understanding     7. Mixed hyperlipidemia  Assessment & Plan:  Patient cannot tolerate statins stable on Zetia 10 mg daily  8. Benign prostatic hyperplasia with lower urinary tract symptoms, symptom details unspecified  Assessment & Plan:   Stable and controlled on Flomax 0.4 mg daily        Preventative care discussed. Encouraged healthy diet choices, reg exercise. Patient agreed w/plan and verbal understanding. Patient advised to call or return with any concerns or problems or worsening conditions. Go to the ER for any severe or potentially life threatening problems. Return in about 4 months (around 10/2/2022). This note was generated in part or in whole with voice recognition software. Voice recognition is usually quite accurate but there are transcription errors that can often occur. All attempts were made to correct these errors I apologized for any  typographical errors that were not detected and corrected. Electronically signed by Marco A Gutierrez.  Celi Laurent MD on 6/2/2022 at 2:39 PM.

## 2022-06-02 NOTE — ASSESSMENT & PLAN NOTE
Stable and controlled on diltiazem 180 mg extended release daily, metoprolol succinate 100 mg nightly  To verify blood pressure cuff accuracy  To keep outpatient BP log,  counseled on exercise and diet (including DASH diet)  Patient agreed with plan with verbal understanding

## 2022-06-02 NOTE — ASSESSMENT & PLAN NOTE
Stable and controlled on metoprolol succinate 100 mg nightly, aspirin 81 mg daily  Follow-up with cardiologist as per the orders

## 2022-06-03 DIAGNOSIS — I49.3 PVC (PREMATURE VENTRICULAR CONTRACTION): ICD-10-CM

## 2022-06-06 LAB — INR BLD: 2.8

## 2022-06-06 PROCEDURE — 93793 ANTICOAG MGMT PT WARFARIN: CPT | Performed by: NURSE PRACTITIONER

## 2022-06-07 ENCOUNTER — ANTI-COAG VISIT (OUTPATIENT)
Dept: CARDIOLOGY CLINIC | Age: 77
End: 2022-06-07
Payer: MEDICARE

## 2022-06-07 DIAGNOSIS — I48.0 PAROXYSMAL ATRIAL FIBRILLATION (HCC): Chronic | ICD-10-CM

## 2022-06-07 DIAGNOSIS — Z95.2 S/P AVR (AORTIC VALVE REPLACEMENT): Chronic | ICD-10-CM

## 2022-06-07 PROCEDURE — 85610 PROTHROMBIN TIME: CPT | Performed by: NURSE PRACTITIONER

## 2022-06-20 LAB
INR BLD: 2.8
INR BLD: 2.8

## 2022-06-20 PROCEDURE — 93793 ANTICOAG MGMT PT WARFARIN: CPT | Performed by: NURSE PRACTITIONER

## 2022-06-21 ENCOUNTER — ANTI-COAG VISIT (OUTPATIENT)
Dept: CARDIOLOGY CLINIC | Age: 77
End: 2022-06-21
Payer: MEDICARE

## 2022-06-21 DIAGNOSIS — Z79.01 CHRONIC ANTICOAGULATION: ICD-10-CM

## 2022-06-21 PROCEDURE — 85610 PROTHROMBIN TIME: CPT | Performed by: NURSE PRACTITIONER

## 2022-06-29 ENCOUNTER — OFFICE VISIT (OUTPATIENT)
Dept: CARDIOLOGY CLINIC | Age: 77
End: 2022-06-29

## 2022-06-29 ENCOUNTER — NURSE ONLY (OUTPATIENT)
Dept: CARDIOLOGY CLINIC | Age: 77
End: 2022-06-29
Payer: MEDICARE

## 2022-06-29 ENCOUNTER — TELEPHONE (OUTPATIENT)
Dept: CARDIOLOGY CLINIC | Age: 77
End: 2022-06-29

## 2022-06-29 VITALS
HEIGHT: 70 IN | OXYGEN SATURATION: 98 % | SYSTOLIC BLOOD PRESSURE: 116 MMHG | HEART RATE: 65 BPM | WEIGHT: 180.5 LBS | DIASTOLIC BLOOD PRESSURE: 72 MMHG | BODY MASS INDEX: 25.84 KG/M2

## 2022-06-29 DIAGNOSIS — I48.0 PAROXYSMAL ATRIAL FIBRILLATION (HCC): ICD-10-CM

## 2022-06-29 DIAGNOSIS — I48.0 PAROXYSMAL ATRIAL FIBRILLATION (HCC): Chronic | ICD-10-CM

## 2022-06-29 DIAGNOSIS — Z86.79 S/P ABLATION OF ATRIAL FLUTTER: Chronic | ICD-10-CM

## 2022-06-29 DIAGNOSIS — Z95.0 CARDIAC PACEMAKER IN SITU: ICD-10-CM

## 2022-06-29 DIAGNOSIS — I25.10 NONOBSTRUCTIVE ATHEROSCLEROSIS OF CORONARY ARTERY: ICD-10-CM

## 2022-06-29 DIAGNOSIS — I48.3 TYPICAL ATRIAL FLUTTER (HCC): ICD-10-CM

## 2022-06-29 DIAGNOSIS — I44.2 ATRIOVENTRICULAR BLOCK, COMPLETE (HCC): Chronic | ICD-10-CM

## 2022-06-29 DIAGNOSIS — Z95.0 CARDIAC PACEMAKER IN SITU: Chronic | ICD-10-CM

## 2022-06-29 DIAGNOSIS — Z95.2 AORTIC VALVE REPLACED: Primary | ICD-10-CM

## 2022-06-29 DIAGNOSIS — Z98.890 S/P ABLATION OF ATRIAL FLUTTER: Chronic | ICD-10-CM

## 2022-06-29 DIAGNOSIS — I48.3 TYPICAL ATRIAL FLUTTER (HCC): Primary | ICD-10-CM

## 2022-06-29 PROCEDURE — G8417 CALC BMI ABV UP PARAM F/U: HCPCS | Performed by: INTERNAL MEDICINE

## 2022-06-29 PROCEDURE — 1036F TOBACCO NON-USER: CPT | Performed by: INTERNAL MEDICINE

## 2022-06-29 PROCEDURE — 99214 OFFICE O/P EST MOD 30 MIN: CPT | Performed by: INTERNAL MEDICINE

## 2022-06-29 PROCEDURE — 1123F ACP DISCUSS/DSCN MKR DOCD: CPT | Performed by: INTERNAL MEDICINE

## 2022-06-29 PROCEDURE — 93280 PM DEVICE PROGR EVAL DUAL: CPT | Performed by: INTERNAL MEDICINE

## 2022-06-29 PROCEDURE — G8427 DOCREV CUR MEDS BY ELIG CLIN: HCPCS | Performed by: INTERNAL MEDICINE

## 2022-06-29 NOTE — TELEPHONE ENCOUNTER
Central scheduling advised the pt that the limited Echo order was not placed, so unable to schedule. Pt called into I to obtain order to schedule. Please advise & thank you.

## 2022-06-29 NOTE — PROGRESS NOTES
[facility]   Cardiac Consultation    Date: 6/29/22  Patient Name: Adele Devi  YOB: 1945    Primary Care Physician: Ray Rizo. Valentino Sings, MD    CHIEF COMPLAINT:   Chief Complaint   Patient presents with    Follow-up    Atrial Fibrillation    Atrial Flutter    Device Check    Coronary Artery Disease       HPI:  Adele Devi is a 68 y.o. male with a history of permanent pacemaker for temporary AV block, post AVR (2006, porcine per patient report), and closure of VSD in 12/2006. He had a radiofrequency catheter ablation for atrial flutter on 12/28/12. He has a history of PAF. He underwent pulmonary vein isolation in 2019 and has done well since then. He is on coumadin for anticoagulation. He was following with EP at [place] office, but is switching back to [facility] office due to location. His echo on 11/26/2021 demonstrated an EF of 55%. 24 hour cardiac event monitor 5/4/2022 demonstrated predominately NSR with an average HR of 78 (60-92) BPM, 30% PVC burden (mostly uniform). Today, patient's device interrogation demonstrates AP 44%,  <1%, no AF, 3 years remaining until RRT. Patient reports he has been feeling well. He reports he works out at [employer] 4 days per week and also works in the yard and tolerates those activities well. Patient is taking all cardiac medications as prescribed and tolerates them well. Patient denies current edema, chest pain, sob, palpitations, dizziness or syncope. Past Medical History:   has a past medical history of Arthritis, Atrial fibrillation ([address] CAD (coronary artery disease), Cardiac pacemaker, Hyperlipidemia, Hypertension, Mitral valve disease, and Thyroid disease. Surgical History:   has a past surgical history that includes Cardiac surgery (2009); cyst removal (1972); skin biopsy;  Pacemaker insertion; Atrial ablation surgery (12-28-12); ventral hernia repair; Diagnostic Cardiac Cath Lab Procedure; and Atrial ablation surgery (11/2019). Social History:   reports that he has never smoked. He has never used smokeless tobacco. He reports current alcohol use. He reports that he does not use drugs. Family History:  family history includes Cancer in his mother. Home Medications:  Outpatient Encounter Medications as of 6/29/2022   Medication Sig Dispense Refill    levothyroxine (SYNTHROID) 88 MCG tablet Take 1 tablet by mouth daily 90 tablet 1    ezetimibe (ZETIA) 10 MG tablet Take 1 tablet by mouth daily 90 tablet 3    furosemide (LASIX) 40 MG tablet Take 1 tablet by mouth  daily 90 tablet 3    metoprolol succinate (TOPROL XL) 100 MG extended release tablet Take 1 tablet by mouth nightly 90 tablet 3    dilTIAZem (CARDIZEM CD) 180 MG extended release capsule TAKE 1 CAPSULE DAILY 90 capsule 3    warfarin (COUMADIN) 2.5 MG tablet TAKE TWO TABS BY MOUTH DAILY OR AS DIRECTED 180 tablet 1    aspirin EC 81 MG EC tablet Take 1 tablet by mouth daily 90 tablet 3    Multiple Vitamins-Minerals (MULTIVITAMIN PO) Take 1 tablet by mouth daily.  tamsulosin (FLOMAX) 0.4 MG capsule Take 0.4 mg by mouth daily. No facility-administered encounter medications on file as of 6/29/2022. DATA:  ECG 6/29/22: Personally reviewed. All current cardiac medications reviewed and adjusted accordingly  6/29/22    Device interrogation reviewed and adjusted accordingly 6/29/22     Echo 11/2021  Summary   LV systolic function is normal with EF estimated at 55%. No regional wall motion abnormalities. Grade II diastolic dysfunction with elevated LV filling pressure. Mild bi-atrial enlargement. A bioprosthetic artificial aortic valve appears well seated and normal   functioning. Mild-moderate mitral regurgitation. Mild eccentric aortic regurgitation. Mild tricuspid and pulmonic regurgitation. Systolic pulmonary artery pressure (SPAP) is estimated at 36 mmHg (RAP of 3   mmHg).       ALEJANDRINA 2/2019   Summary   Left ventricular systolic function is normal with ejection fraction   estimated at 55%. No regional wall motion abnormalities. There is concentric   left ventricular hypertrophy. The left atrium is dilated. There is no evidence of mass or thrombus in the left atrium or appendage. Echo 1/2019  Summary   Left ventricular systolic function is normal with ejection fraction   estimated at 55%. No regional wall motion abnormalities. There is moderate concentric left ventricular hypertrophy. Grade II diastolic dysfunction with elevated left ventricular filling   pressure. Moderate bi-atrial enlargement. A bioprosthetic aortic valve appears well seated with normal doppler   parameters for valve. Mild to moderate tricuspid regurgitation. Systolic pulmonary artery pressure (SPAP) is estimated at 46 mmHg consistent   with mild pulmonary hypertension (Right atrial pressure of 3 mmHg). Stress test 1/2019  Summary Unable to assess LVEF and wall motion due to afib with rapid rates  Perfusion images show areas of reversibility in the inferolateral wall  consistent with ischemia of this territory. This would be an intermediate to high risk study. Allergies:  Crestor [rosuvastatin calcium] and Digoxin     Review of Systems   Constitutional: Negative. HENT: Negative. Eyes: Negative. Respiratory: Negative. Cardiovascular: Negative. Gastrointestinal: Negative. Genitourinary: Negative. Musculoskeletal: Negative. Skin: Negative. Neurological: Negative. Hematological: Negative. Psychiatric/Behavioral: Negative. /72   Pulse 65   Ht 5' 10\" (1.778 m)   Wt 180 lb 8 oz (81.9 kg)   SpO2 98%   BMI 25.90 kg/m²       Objective:  Physical Exam   Constitutional: He is oriented to person, place, and time. He appears well-developed and well-nourished. HENT:   Head: Normocephalic and atraumatic. Eyes: Pupils are equal, round, and reactive to light. Neck: Normal range of motion. Cardiovascular: Normal rate, regular rhythm and 2/6 diastolic murmur. Pulmonary/Chest: Effort normal and breath sounds normal.   Abdominal: Soft. No tenderness. Musculoskeletal: Normal range of motion. He exhibits no edema. Neurological: He is alert and oriented to person, place, and time. Skin: Skin is warm and dry. Psychiatric: He has a normal mood and affect. Assessment:  1. Transient CHB- s/p PPM 2/2014. 2. PAF-he still has some episodes of AF which are sustained in duration, lasting more than 20 minutes. His overall AF burden however has decreased from about 50% to less than 1%. RFCA for AF 2019.   3. Frequent PVCs- 30% per 24 hour cardiac event monitor 5/2022. No apparent symptoms. Limited echo to reassess LVEF. 4. S/P AVR: porcine in 2006  5. S/P VSD repair in 2006  6. Increased AI on exam 11/19/2021- per echo. Plan:  1. Remote device checks every 3 months. 2. Limited echo to reassess LVEF. 3. Follow up with JOSE L in 3 months. QUALITY MEASURES  1. Tobacco Cessation Counseling: NA  2. Retake of BP if >140/90:   NA  3. Documentation to PCP/referring for new patient:  Sent to PCP at close of office visit  4. CAD patient on anti-platelet: NA  5. CAD patient on STATIN therapy:  NA  6. Patient with CHF and aFib on anticoagulation:  YES    This note has been scribed in the presence of Dmitry Martines MD, by Cheryl Nuñez RN.       I, Dr. Dmitry Martines, personally performed the services described in this documentation as scribed by Cheryl Nuñez RN in my presence, and it is both accurate and complete.       Dmitry Martines M.D.

## 2022-06-29 NOTE — PROGRESS NOTES
Patient presents to the device clinic today for a programming evaluation for his pacemaker. Patient has a history of A Flutter, pAF, and CHB. Takes coumadin, Cardizem, and Toprol XL. Last device interrogation was on 5/23. Since then, AT/AF burden measures <1%. Frequent ectopy noted during testing - ectopy burden measures 19%. P wave: 3.0 mV  R wave: 3.1 mV    AP 44%  <1%    All sensing and pacing parameters are within normal range. No changes need to be made at this time. Patient will see Dr. Tucker Sparks today in office. Patient education was provided about device functionality, in home monitoring, and any other patient questions and/or concerns were addressed. Patient voices understanding. Patient will follow up in 3 months in office or remotely. Please see interrogation for more detail - Paceart report located under the Cardiology tab.

## 2022-06-30 DIAGNOSIS — R94.30 CARDIAC LEFT VENTRICULAR EJECTION FRACTION GREATER THAN 40 PERCENT: Primary | ICD-10-CM

## 2022-06-30 DIAGNOSIS — I49.3 PVC (PREMATURE VENTRICULAR CONTRACTION): Primary | ICD-10-CM

## 2022-06-30 NOTE — TELEPHONE ENCOUNTER
pts wife called and stated pt seen [facility] yesterday and upon calling central scheduling for echo they stated to pt that medicare wont cover this due to the code not being a medical necessity. Please advise.

## 2022-07-01 NOTE — TELEPHONE ENCOUNTER
Called pt and wife left VM letting them know that I placed a new order for a limited echo in pt chart and for them to call central scheduling to get echo rescheduled.

## 2022-07-04 LAB — INR BLD: 2.6

## 2022-07-04 PROCEDURE — 93793 ANTICOAG MGMT PT WARFARIN: CPT | Performed by: NURSE PRACTITIONER

## 2022-07-05 ENCOUNTER — ANTI-COAG VISIT (OUTPATIENT)
Dept: CARDIOLOGY CLINIC | Age: 77
End: 2022-07-05
Payer: MEDICARE

## 2022-07-05 ENCOUNTER — HOSPITAL ENCOUNTER (OUTPATIENT)
Dept: NON INVASIVE DIAGNOSTICS | Age: 77
Discharge: HOME | End: 2022-07-05
Payer: MEDICARE

## 2022-07-05 DIAGNOSIS — Z95.2 S/P AVR (AORTIC VALVE REPLACEMENT): Chronic | ICD-10-CM

## 2022-07-05 DIAGNOSIS — I49.3 PVC (PREMATURE VENTRICULAR CONTRACTION): ICD-10-CM

## 2022-07-05 PROCEDURE — 93308 TTE F-UP OR LMTD: CPT

## 2022-07-05 PROCEDURE — 85610 PROTHROMBIN TIME: CPT | Performed by: NURSE PRACTITIONER

## 2022-07-06 ENCOUNTER — TELEPHONE (OUTPATIENT)
Dept: CARDIOLOGY CLINIC | Age: 77
End: 2022-07-06

## 2022-07-06 NOTE — TELEPHONE ENCOUNTER
Pt requesting results of ECHO that was done 7/5/22. Pt wanting to make plans for the summer but wants to make sure JOSE L doesn't need him to do more testing. Please advise.

## 2022-07-07 ENCOUNTER — TELEPHONE (OUTPATIENT)
Dept: CARDIOLOGY CLINIC | Age: 77
End: 2022-07-07

## 2022-07-18 ENCOUNTER — ANTI-COAG VISIT (OUTPATIENT)
Dept: CARDIOLOGY CLINIC | Age: 77
End: 2022-07-18
Payer: MEDICARE

## 2022-07-18 LAB — INR BLD: 3

## 2022-07-18 PROCEDURE — 93793 ANTICOAG MGMT PT WARFARIN: CPT | Performed by: NURSE PRACTITIONER

## 2022-07-25 NOTE — TELEPHONE ENCOUNTER
LOV  8/20/21    LABS   1/17/22 INR  2.70 What Is The Reason For Today's Visit?: Full Body Skin Examination What Is The Reason For Today's Visit? (Being Monitored For X): concerning skin lesions on an annual basis

## 2022-08-01 ENCOUNTER — TELEPHONE (OUTPATIENT)
Dept: PRIMARY CARE CLINIC | Age: 77
End: 2022-08-01

## 2022-08-01 DIAGNOSIS — U07.1 SARS-COV-2 POSITIVE: ICD-10-CM

## 2022-08-01 DIAGNOSIS — U07.1 SARS-COV-2 POSITIVE: Primary | ICD-10-CM

## 2022-08-01 PROCEDURE — 93793 ANTICOAG MGMT PT WARFARIN: CPT | Performed by: NURSE PRACTITIONER

## 2022-08-01 NOTE — TELEPHONE ENCOUNTER
Patient called because his wife tested positive for covid a week ago and at that time the patient tested negative. Last week, on 7/27/22, the patient was diagnosed with bronchitis and then also got a UTI. The patient has been on an antibiotic and the UTI has cleared up and he still has a lingering cough from the bronchitis. Patient is not experiencing any fatigue. He took a covid test today and he is showing a faint pink line for being positive. His wife was put on the antiviral medication. The patient said he can't take paxil because he has heart issues. He was hoping Dr. Lata Lawson could advise him on what to do going forward.

## 2022-08-01 NOTE — TELEPHONE ENCOUNTER
Patient with positive COVID-19 test.  Patient on day 5 since onset of symptoms, and still has respiratory symptoms productive cough chest congestion. Meets criteria for COVID-19 antiviral.  Will not prescribe Paxlovid because of drug interactions. Will prescribe molupiravir 5 day course. Informed patient of potential side effects, allergic reactions; and precautions to take while on medication.

## 2022-08-01 NOTE — TELEPHONE ENCOUNTER
Patient would like prescription called into the [facility]. His insurance won't cover the medication if called in to the pharmacy. Insurance cleared medication if called into [facility]. Please call patient and let him know. His wife was leaving to make the 45 minute drive to [facility].

## 2022-08-03 LAB — INR BLD: 2.9

## 2022-08-04 ENCOUNTER — ANTI-COAG VISIT (OUTPATIENT)
Dept: CARDIOLOGY CLINIC | Age: 77
End: 2022-08-04
Payer: MEDICARE

## 2022-08-15 LAB — INR BLD: 3

## 2022-08-15 PROCEDURE — 93793 ANTICOAG MGMT PT WARFARIN: CPT | Performed by: NURSE PRACTITIONER

## 2022-08-16 ENCOUNTER — ANTI-COAG VISIT (OUTPATIENT)
Dept: CARDIOLOGY CLINIC | Age: 77
End: 2022-08-16
Payer: MEDICARE

## 2022-08-16 PROCEDURE — 85610 PROTHROMBIN TIME: CPT | Performed by: NURSE PRACTITIONER

## 2022-08-22 ENCOUNTER — NURSE ONLY (OUTPATIENT)
Dept: CARDIOLOGY CLINIC | Age: 77
End: 2022-08-22
Payer: MEDICARE

## 2022-08-22 DIAGNOSIS — Z95.0 PACEMAKER: ICD-10-CM

## 2022-08-22 DIAGNOSIS — I44.2 ATRIOVENTRICULAR BLOCK, COMPLETE (HCC): Primary | Chronic | ICD-10-CM

## 2022-08-22 PROCEDURE — 93296 REM INTERROG EVL PM/IDS: CPT | Performed by: INTERNAL MEDICINE

## 2022-08-22 PROCEDURE — 93294 REM INTERROG EVL PM/LDLS PM: CPT | Performed by: INTERNAL MEDICINE

## 2022-08-23 NOTE — PROGRESS NOTES
We received remote transmission from patient's monitor at home. Transmission shows normal sensing and pacing function. Noted AF and AT. Pt remains on coumadin and Toprol. Noted far field sensing. EP physician will review. See interrogation under cardiology tab in the [address] [place] [address] [address] field for more details. AP 45%   <1%    End of 91-day monitoring period 8-22-22.

## 2022-08-29 LAB — INR BLD: 2.7

## 2022-08-29 PROCEDURE — 93793 ANTICOAG MGMT PT WARFARIN: CPT | Performed by: NURSE PRACTITIONER

## 2022-09-02 ENCOUNTER — ANTI-COAG VISIT (OUTPATIENT)
Dept: CARDIOLOGY CLINIC | Age: 77
End: 2022-09-02
Payer: MEDICARE

## 2022-09-07 ENCOUNTER — TELEPHONE (OUTPATIENT)
Dept: PRIMARY CARE CLINIC | Age: 77
End: 2022-09-07

## 2022-09-07 NOTE — TELEPHONE ENCOUNTER
Patient called because he has about a week left of his ezetimibe (ZETIA) 10 MG tablet. It looks like he has 3 refills left. Patient was going to check with the pharmacy to see if he had a refill waiting for him. If he does not, he will give us a call back.

## 2022-09-12 LAB — INR BLD: 3

## 2022-09-12 PROCEDURE — 93793 ANTICOAG MGMT PT WARFARIN: CPT | Performed by: NURSE PRACTITIONER

## 2022-09-13 ENCOUNTER — ANTI-COAG VISIT (OUTPATIENT)
Dept: CARDIOLOGY CLINIC | Age: 77
End: 2022-09-13
Payer: MEDICARE

## 2022-09-13 PROCEDURE — 85610 PROTHROMBIN TIME: CPT | Performed by: NURSE PRACTITIONER

## 2022-09-19 ENCOUNTER — TELEPHONE (OUTPATIENT)
Dept: PRIMARY CARE CLINIC | Age: 77
End: 2022-09-19

## 2022-09-19 NOTE — TELEPHONE ENCOUNTER
Herbert Moreau called and he has been having some Urinary issues and wanted to know if Dr. Xavier Matamoros could refer him to a Urologist close to him. Maybe Phillip or Jim Argueta in [place]. Please call to advise.

## 2022-09-20 DIAGNOSIS — R35.1 NOCTURIA: Primary | ICD-10-CM

## 2022-09-20 NOTE — TELEPHONE ENCOUNTER
Please let patient know that I referred him to Dr. Hailey Ortiz, at the [facility] office

## 2022-09-26 LAB — INR BLD: 2.9

## 2022-09-26 PROCEDURE — 85610 PROTHROMBIN TIME: CPT | Performed by: NURSE PRACTITIONER

## 2022-09-27 ENCOUNTER — ANTI-COAG VISIT (OUTPATIENT)
Dept: CARDIOLOGY CLINIC | Age: 77
End: 2022-09-27
Payer: MEDICARE

## 2022-09-27 PROCEDURE — 93793 ANTICOAG MGMT PT WARFARIN: CPT | Performed by: NURSE PRACTITIONER

## 2022-10-05 ENCOUNTER — OFFICE VISIT (OUTPATIENT)
Dept: PRIMARY CARE CLINIC | Age: 77
End: 2022-10-05
Payer: MEDICARE

## 2022-10-05 VITALS
HEART RATE: 116 BPM | DIASTOLIC BLOOD PRESSURE: 64 MMHG | OXYGEN SATURATION: 98 % | RESPIRATION RATE: 14 BRPM | SYSTOLIC BLOOD PRESSURE: 110 MMHG | BODY MASS INDEX: 25.77 KG/M2 | WEIGHT: 180 LBS | HEIGHT: 70 IN

## 2022-10-05 DIAGNOSIS — E03.9 ACQUIRED HYPOTHYROIDISM: ICD-10-CM

## 2022-10-05 DIAGNOSIS — I48.0 PAROXYSMAL ATRIAL FIBRILLATION (HCC): ICD-10-CM

## 2022-10-05 DIAGNOSIS — R32 URINARY INCONTINENCE, UNSPECIFIED TYPE: Primary | ICD-10-CM

## 2022-10-05 DIAGNOSIS — Z01.818 PREOP EXAMINATION: ICD-10-CM

## 2022-10-05 DIAGNOSIS — I10 PRIMARY HYPERTENSION: ICD-10-CM

## 2022-10-05 DIAGNOSIS — Z23 NEEDS FLU SHOT: ICD-10-CM

## 2022-10-05 DIAGNOSIS — R39.9 ABNORMAL CYSTOSCOPY: ICD-10-CM

## 2022-10-05 LAB
ALBUMIN SERPL-MCNC: 5 G/DL (ref 3.4–5)
ANION GAP SERPL CALCULATED.3IONS-SCNC: 12 MMOL/L (ref 3–16)
BASOPHILS ABSOLUTE: 0.1 K/UL (ref 0–0.2)
BASOPHILS RELATIVE PERCENT: 1.7 %
BUN BLDV-MCNC: 27 MG/DL (ref 7–20)
CALCIUM SERPL-MCNC: 9.2 MG/DL (ref 8.3–10.6)
CHLORIDE BLD-SCNC: 101 MMOL/L (ref 99–110)
CO2: 26 MMOL/L (ref 21–32)
CREAT SERPL-MCNC: 0.8 MG/DL (ref 0.8–1.3)
EOSINOPHILS ABSOLUTE: 0.1 K/UL (ref 0–0.6)
EOSINOPHILS RELATIVE PERCENT: 2.6 %
GFR AFRICAN AMERICAN: >60
GFR NON-AFRICAN AMERICAN: >60
GLUCOSE BLD-MCNC: 90 MG/DL (ref 70–99)
HCT VFR BLD CALC: 40.8 % (ref 40.5–52.5)
HEMOGLOBIN: 13.6 G/DL (ref 13.5–17.5)
LYMPHOCYTES ABSOLUTE: 0.7 K/UL (ref 1–5.1)
LYMPHOCYTES RELATIVE PERCENT: 13.4 %
MCH RBC QN AUTO: 32.2 PG (ref 26–34)
MCHC RBC AUTO-ENTMCNC: 33.3 G/DL (ref 31–36)
MCV RBC AUTO: 96.6 FL (ref 80–100)
MONOCYTES ABSOLUTE: 0.5 K/UL (ref 0–1.3)
MONOCYTES RELATIVE PERCENT: 9.5 %
NEUTROPHILS ABSOLUTE: 4 K/UL (ref 1.7–7.7)
NEUTROPHILS RELATIVE PERCENT: 72.8 %
PDW BLD-RTO: 14.8 % (ref 12.4–15.4)
PHOSPHORUS: 3.5 MG/DL (ref 2.5–4.9)
PLATELET # BLD: 138 K/UL (ref 135–450)
PMV BLD AUTO: 8.3 FL (ref 5–10.5)
POTASSIUM SERPL-SCNC: 4.4 MMOL/L (ref 3.5–5.1)
RBC # BLD: 4.22 M/UL (ref 4.2–5.9)
SODIUM BLD-SCNC: 139 MMOL/L (ref 136–145)
TSH SERPL DL<=0.05 MIU/L-ACNC: 2.56 UIU/ML (ref 0.27–4.2)
WBC # BLD: 5.5 K/UL (ref 4–11)

## 2022-10-05 PROCEDURE — 1036F TOBACCO NON-USER: CPT | Performed by: INTERNAL MEDICINE

## 2022-10-05 PROCEDURE — 1123F ACP DISCUSS/DSCN MKR DOCD: CPT | Performed by: INTERNAL MEDICINE

## 2022-10-05 PROCEDURE — G8417 CALC BMI ABV UP PARAM F/U: HCPCS | Performed by: INTERNAL MEDICINE

## 2022-10-05 PROCEDURE — G8427 DOCREV CUR MEDS BY ELIG CLIN: HCPCS | Performed by: INTERNAL MEDICINE

## 2022-10-05 PROCEDURE — 99214 OFFICE O/P EST MOD 30 MIN: CPT | Performed by: INTERNAL MEDICINE

## 2022-10-05 PROCEDURE — 90694 VACC AIIV4 NO PRSRV 0.5ML IM: CPT | Performed by: INTERNAL MEDICINE

## 2022-10-05 PROCEDURE — G8484 FLU IMMUNIZE NO ADMIN: HCPCS | Performed by: INTERNAL MEDICINE

## 2022-10-05 PROCEDURE — G0008 ADMIN INFLUENZA VIRUS VAC: HCPCS | Performed by: INTERNAL MEDICINE

## 2022-10-05 ASSESSMENT — ENCOUNTER SYMPTOMS
TROUBLE SWALLOWING: 0
CONSTIPATION: 0
SORE THROAT: 0
SINUS PRESSURE: 0
ABDOMINAL PAIN: 0
ABDOMINAL DISTENTION: 0
BACK PAIN: 0
CHEST TIGHTNESS: 0
VOMITING: 0
EYE PAIN: 0
BLOOD IN STOOL: 0
COLOR CHANGE: 0
DIARRHEA: 0
COUGH: 0
SHORTNESS OF BREATH: 0
WHEEZING: 0
EYE REDNESS: 0
NAUSEA: 0

## 2022-10-05 NOTE — ASSESSMENT & PLAN NOTE
Patient diagnosed in the past with the same   Patient scheduled to have surgery   Patient here for H&P  Requisite labs/ECG/COVID-19 nasal swab have been   Ordered  Patient will see cardiologist for cardiac clearance  Follow up with specialist as per their orders

## 2022-10-05 NOTE — PROGRESS NOTES
Preoperative Assessment    Date of Surgery:  10/17/2022    Reason for Surgery: Cystoscopy  Chief Complaint: Urinary incontinence    This patient presents to the office today for a preoperative consultation at the request of surgeon, Dr. Shreya Vasquez     Planned anesthesia:  Victor M Pizarro   Prior anesthesia: YES  Known anesthesia problems:  NO  Recent corticosteroid use  NO  Blood thinner use YES  H/o MARY  NO    Recent symptoms idoes not include fever, chills, chest pain, cough, dyspnea, dysuria, urinary frequency, nausea, vomiting, diarrhea, abdominal pain, easy bruising, lower extremity swelling or poor exercise tolerance. Pertinent medical history includes prior anesthesia, frequent aspirin use, Paroxysmal A Fib on warfarin, H/o Complee AV Block, s/p A Flutter,  HTN,  Non-obstructive CAD, Acquired Hypothyroidism while pertinent medical history does not include previous anesthesia reaction, diabetes, pulmonary disease, renal disease, sleep apnea, thromboembolic problems, clotting disorder, bleeding disorder, transfusion reaction, impaired immunity or corticosteroid use in the last six months. Pertinent family history  does not include anesthesia reaction, myocardial infarction, aneurysm, sudden death, clotting disorder or bleeding disorder. Pertinent social history does  include warfarin, ASA, does not include drug use, tobacco use  alcohol use, illicit drug use, transfusion refusal, wearing dentures or partial plates or concerns regarding care after surgery. After surgery the patient plans to recover at home with family.     Has Acute Coronary Syndrome (ACS): No  Functional Capacity -4 METS  Revised cardiac risk index - 1     Bleeding risk: Anticoagulant therapy- On warfarin an Aspirin  Past Surgical History:   Procedure Laterality Date    ATRIAL ABLATION SURGERY  12-28-12    ablation of IVC-Tricuspid Valve    ATRIAL ABLATION SURGERY  11/2019    CARDIAC SURGERY  2009    AVR pig    CYST REMOVAL  1972    DIAGNOSTIC CARDIAC CATH LAB PROCEDURE      PACEMAKER INSERTION      A-fib/flutter    SKIN BIOPSY      VENTRAL HERNIA REPAIR        Allergies   Allergen Reactions    Crestor [Rosuvastatin Calcium] Rash    Digoxin Hives      Recent Labs:  CBC:   Lab Results   Component Value Date/Time    WBC 4.1 05/22/2022 06:30 AM    HGB 13.4 05/22/2022 06:30 AM    HCT 39.9 05/22/2022 06:30 AM    MCH 32.0 05/22/2022 06:30 AM    MCHC 33.6 05/22/2022 06:30 AM    RDW 14.3 05/22/2022 06:30 AM     05/22/2022 06:30 AM    MPV 7.4 05/22/2022 06:30 AM     CMP:   Lab Results   Component Value Date/Time     05/22/2022 06:30 AM    K 4.8 05/22/2022 06:30 AM    K 3.9 08/31/2020 05:28 AM     05/22/2022 06:30 AM    CO2 28 05/22/2022 06:30 AM    ANIONGAP 9 05/22/2022 06:30 AM    GLUCOSE 105 05/22/2022 06:30 AM    BUN 30 05/22/2022 06:30 AM    CREATININE 1.0 05/22/2022 06:30 AM    GFRAA >60 05/22/2022 06:30 AM    GFRAA >60 10/11/2011 01:48 PM    CALCIUM 9.5 05/22/2022 06:30 AM    PROT 7.3 05/22/2022 06:30 AM    PROT 6.8 10/11/2011 01:48 PM    LABALBU 4.7 05/22/2022 06:30 AM    AGRATIO 1.8 05/22/2022 06:30 AM    BILITOT 0.9 05/22/2022 06:30 AM    ALKPHOS 81 05/22/2022 06:30 AM    ALT 17 05/22/2022 06:30 AM    AST 22 05/22/2022 06:30 AM    GLOB 2.9 02/13/2019 07:05 AM     EKG Interpretation:  Pending. Review of Systems   Constitutional:  Negative for activity change, appetite change, chills, fatigue, fever and unexpected weight change. HENT:  Negative for congestion, ear pain, postnasal drip, sinus pressure, sore throat, tinnitus and trouble swallowing. Eyes:  Negative for pain and redness. Respiratory:  Negative for cough, chest tightness, shortness of breath and wheezing. Cardiovascular:  Negative for chest pain, palpitations and leg swelling. Gastrointestinal:  Negative for abdominal distention, abdominal pain, blood in stool, constipation, diarrhea, nausea and vomiting.    Endocrine: Negative for cold intolerance, heat intolerance and polydipsia. Genitourinary:  Positive for frequency. Negative for decreased urine volume, dysuria, flank pain, hematuria and urgency. Positive urinary incontinence   Musculoskeletal:  Negative for arthralgias, back pain, joint swelling, neck pain and neck stiffness. Skin:  Negative for color change and rash. Neurological:  Negative for dizziness, weakness, numbness and headaches. Hematological:  Negative for adenopathy. Psychiatric/Behavioral:  Negative for behavioral problems, sleep disturbance and suicidal ideas. The patient is not nervous/anxious. /64 (Site: Left Upper Arm, Position: Sitting, Cuff Size: Large Adult)   Pulse (!) 116   Resp 14   Ht 5' 10\" (1.778 m)   Wt 180 lb (81.6 kg)   SpO2 98%   BMI 25.83 kg/m²    Physical Exam  Constitutional:       General: He is not in acute distress. Appearance: Normal appearance. He is not ill-appearing. HENT:      Head: Normocephalic and atraumatic. Right Ear: Tympanic membrane, ear canal and external ear normal. There is no impacted cerumen. Left Ear: Tympanic membrane, ear canal and external ear normal. There is no impacted cerumen. Mouth/Throat:      Mouth: Mucous membranes are moist.      Pharynx: No oropharyngeal exudate or posterior oropharyngeal erythema. Eyes:      Extraocular Movements: Extraocular movements intact. Conjunctiva/sclera: Conjunctivae normal.      Pupils: Pupils are equal, round, and reactive to light. Neck:      Vascular: No carotid bruit. Cardiovascular:      Rate and Rhythm: Normal rate and regular rhythm. Pulses: Normal pulses. Heart sounds: Normal heart sounds. No murmur heard. No gallop. Pulmonary:      Effort: Pulmonary effort is normal.      Breath sounds: Normal breath sounds. No wheezing, rhonchi or rales. Abdominal:      General: Abdomen is flat. Bowel sounds are normal. There is no distension. Palpations: Abdomen is soft. Tenderness:  There is no abdominal tenderness. There is no guarding or rebound. Musculoskeletal:         General: No swelling or tenderness. Normal range of motion. Cervical back: No tenderness. Lymphadenopathy:      Cervical: No cervical adenopathy. Skin:     Findings: No erythema, lesion or rash. Neurological:      General: No focal deficit present. Mental Status: He is alert and oriented to person, place, and time. Mental status is at baseline. Cranial Nerves: No cranial nerve deficit. Motor: No weakness. Psychiatric:         Mood and Affect: Mood normal.         Behavior: Behavior normal.         Thought Content: Thought content normal.         Judgment: Judgment normal.        Assessment:  1. Urinary incontinence, unspecified type  Assessment & Plan:   Patient diagnosed in the past with the same   Patient scheduled to have surgery   Patient here for H&P  Requisite labs/ECG/COVID-19 nasal swab have been   Ordered  Patient will see cardiologist for cardiac clearance  Follow up with specialist as per their orders   2. Preop examination  Assessment & Plan:  See above  Orders:  -     EKG 12 Lead; Future  -     CBC with Auto Differential; Future  -     Renal Function Panel; Future  3. Abnormal cystoscopy  4. Paroxysmal atrial fibrillation (HCC)  Assessment & Plan:   Stable on diltiazem 180 mg ER, metoprolol 100 mg nightly, warfarin  Follow-up with Cardiologist as per the orders  Orders:  -     EKG 12 Lead; Future  5. Primary hypertension  Assessment & Plan:   Stable and controlled on diltiazem 180 mg extended release daily, metoprolol succinate 100 mg nightly  To verify blood pressure cuff accuracy  To keep outpatient BP log,  counseled on exercise and diet (including DASH diet)  Patient agreed with plan with verbal understanding     Orders:  -     CBC with Auto Differential; Future  -     Renal Function Panel; Future  6.  Acquired hypothyroidism  Assessment & Plan:   Recent TSH 6.17, increased levothyroxine to 88 mcg daily  Patient due for labs   Orders:  -     TSH; Future  7. Needs flu shot  -     Influenza, FLUAD, (age 72 y+), IM, PF, 0.5 mL       68 year old patient with planned surgery as above. Known risk factors for perioperative complications: Paroxysmal A Fib on warfarin, H/o Complee AV Block, s/p A Flutter,  HTN,  Non-obstructive CAD, Acquired Hypothyroidism     Plan:    1. Preoperative workup as follows: Labs, ECG, COVID-19 swab  2. Change in medication regimen before surgery: As per surgeon and Cardiologist.   3. Prophylaxis for cardiac events with perioperative beta-blockers:  Please take beta blocker dose  night before  surgery per usual regimen. Patient has no cardiorespiratory symptoms with Revised cardiac risk index of 1, METS 4. Patient is at low-moderate risk for a low risk NON-cardiac procedure.   Patient will obtain official cardiac clearance from cardiologist.

## 2022-10-07 ENCOUNTER — TELEPHONE (OUTPATIENT)
Dept: CARDIOLOGY CLINIC | Age: 77
End: 2022-10-07

## 2022-10-10 LAB — INR BLD: 2.9

## 2022-10-10 PROCEDURE — 85610 PROTHROMBIN TIME: CPT | Performed by: NURSE PRACTITIONER

## 2022-10-11 ENCOUNTER — ANTI-COAG VISIT (OUTPATIENT)
Dept: CARDIOLOGY CLINIC | Age: 77
End: 2022-10-11
Payer: MEDICARE

## 2022-10-11 PROCEDURE — 93793 ANTICOAG MGMT PT WARFARIN: CPT | Performed by: NURSE PRACTITIONER

## 2022-10-12 ENCOUNTER — TELEPHONE (OUTPATIENT)
Dept: PRIMARY CARE CLINIC | Age: 77
End: 2022-10-12

## 2022-10-12 NOTE — TELEPHONE ENCOUNTER
Lai Valdivia from the Urology center needs Ede's history report and lab results faxed over to [phone].

## 2022-10-24 LAB — INR BLD: 2.6

## 2022-10-24 PROCEDURE — 85610 PROTHROMBIN TIME: CPT | Performed by: NURSE PRACTITIONER

## 2022-10-25 ENCOUNTER — ANTI-COAG VISIT (OUTPATIENT)
Dept: CARDIOLOGY CLINIC | Age: 77
End: 2022-10-25
Payer: MEDICARE

## 2022-10-25 PROCEDURE — 93793 ANTICOAG MGMT PT WARFARIN: CPT | Performed by: NURSE PRACTITIONER

## 2022-11-03 ENCOUNTER — NURSE ONLY (OUTPATIENT)
Dept: CARDIOLOGY CLINIC | Age: 77
End: 2022-11-03
Payer: MEDICARE

## 2022-11-03 ENCOUNTER — OFFICE VISIT (OUTPATIENT)
Dept: CARDIOLOGY CLINIC | Age: 77
End: 2022-11-03
Payer: MEDICARE

## 2022-11-03 VITALS
WEIGHT: 180.2 LBS | HEIGHT: 70 IN | OXYGEN SATURATION: 98 % | HEART RATE: 73 BPM | DIASTOLIC BLOOD PRESSURE: 64 MMHG | SYSTOLIC BLOOD PRESSURE: 126 MMHG | BODY MASS INDEX: 25.8 KG/M2

## 2022-11-03 DIAGNOSIS — Z95.0 CARDIAC PACEMAKER IN SITU: ICD-10-CM

## 2022-11-03 DIAGNOSIS — I49.3 PVC (PREMATURE VENTRICULAR CONTRACTION): ICD-10-CM

## 2022-11-03 DIAGNOSIS — I44.2 ATRIOVENTRICULAR BLOCK, COMPLETE (HCC): Primary | Chronic | ICD-10-CM

## 2022-11-03 DIAGNOSIS — I48.0 PAROXYSMAL ATRIAL FIBRILLATION (HCC): Primary | ICD-10-CM

## 2022-11-03 DIAGNOSIS — I48.0 PAROXYSMAL ATRIAL FIBRILLATION (HCC): Chronic | ICD-10-CM

## 2022-11-03 DIAGNOSIS — I48.3 TYPICAL ATRIAL FLUTTER (HCC): ICD-10-CM

## 2022-11-03 PROCEDURE — G8427 DOCREV CUR MEDS BY ELIG CLIN: HCPCS

## 2022-11-03 PROCEDURE — 1123F ACP DISCUSS/DSCN MKR DOCD: CPT

## 2022-11-03 PROCEDURE — 93280 PM DEVICE PROGR EVAL DUAL: CPT | Performed by: INTERNAL MEDICINE

## 2022-11-03 PROCEDURE — 99214 OFFICE O/P EST MOD 30 MIN: CPT

## 2022-11-03 PROCEDURE — 3074F SYST BP LT 130 MM HG: CPT

## 2022-11-03 PROCEDURE — G8417 CALC BMI ABV UP PARAM F/U: HCPCS

## 2022-11-03 PROCEDURE — G8484 FLU IMMUNIZE NO ADMIN: HCPCS

## 2022-11-03 PROCEDURE — 1036F TOBACCO NON-USER: CPT

## 2022-11-03 PROCEDURE — 3078F DIAST BP <80 MM HG: CPT

## 2022-11-03 RX ORDER — FINASTERIDE 5 MG/1
5 TABLET, FILM COATED ORAL DAILY
COMMUNITY
Start: 2022-09-23

## 2022-11-03 NOTE — PROGRESS NOTES
[facility]   Electrophysiology Outpatient Note              Date:  November 3, 2022  Patient name: Severo Moya  YOB: 1945    Primary Care physician: Laura Roman. Dwight Beck MD    HISTORY OF PRESENT ILLNESS: The patient is a 68 y.o.  male with a history of AV block, atrial flutter, atrial fibrillation, AVR, and VSD. In 2012 he had a RFCA of atrial flutter. In 2019 he had a RFCA with pulmonary isolation. In 11/2021 ehco showed EF of 55%. In 5/2022 24 cardiac event monitor showed predominately SR with average HR of 78 bpm, 30% PVC burden. In 7/2022 limited echo showed EF of 55-60%, no WMA, mild MR, mild TR and mild pulmonary HTN. Today he is being seen for AV block, paroxysmal atrial arrhythmias and PVC's. He has been doing well since we last saw him in the office. He had some urinary problems and had bladder stones removed. He has no complaints. He denies chest pain, SOB, palpitations and dizziness. He works out 4 days a week and [employer]. He just got clearance today from his urologist that he is able to resume his normal activities. Device check today shows:   Brand: Abbott  Mode: DDDR  Normal function  44% AP  <1%    Arrhythmias: <1% AF burden, some PSVT noted, 1 NSVT event  Battery life 2.5-3.9 years  RA impedance 440 ohms   RV impedance 410 ohms   RA threshold 1.0 V @ 0.4 ms  RV threshold 1.75 V @ 0.4 ms  RA sensitivity 3.2 mV  RV sensitivity 4.1 mV    Past Medical History:   has a past medical history of Arthritis, Atrial fibrillation ([address] CAD (coronary artery disease), Cardiac pacemaker, Hyperlipidemia, Hypertension, Mitral valve disease, and Thyroid disease. Past Surgical History:   has a past surgical history that includes Cardiac surgery (2009); cyst removal (1972); skin biopsy; Pacemaker insertion; Atrial ablation surgery (12-28-12); ventral hernia repair; Diagnostic Cardiac Cath Lab Procedure; and Atrial ablation surgery (11/2019).      Home Medications:    Prior to Admission medications    Medication Sig Start Date End Date Taking? Authorizing Provider   finasteride (PROSCAR) 5 MG tablet Take 5 mg by mouth daily 9/23/22  Yes Historical Provider, MD   levothyroxine (SYNTHROID) 88 MCG tablet Take 1 tablet by mouth daily 6/2/22  Yes Flakito Painter MD   ezetimibe (ZETIA) 10 MG tablet Take 1 tablet by mouth daily 5/18/22  Yes Durga Castro MD   furosemide (LASIX) 40 MG tablet Take 1 tablet by mouth  daily 5/18/22  Yes Durga Castro MD   metoprolol succinate (TOPROL XL) 100 MG extended release tablet Take 1 tablet by mouth nightly 5/18/22  Yes Durga Castro MD   dilTIAZem [facility]) 180 MG extended release capsule TAKE 1 CAPSULE DAILY 2/21/22  Yes Durga Castro MD   warfarin (COUMADIN) 2.5 MG tablet TAKE TWO TABS BY MOUTH DAILY OR AS DIRECTED 1/31/22  Yes Ari Juarez MD   aspirin EC 81 MG EC tablet Take 1 tablet by mouth daily 12/6/18  Yes Durga Castro MD   Multiple Vitamins-Minerals (MULTIVITAMIN PO) Take 1 tablet by mouth daily. Yes Historical Provider, MD   tamsulosin (FLOMAX) 0.4 MG capsule Take 0.4 mg by mouth daily. Yes Historical Provider, MD       Allergies:  Crestor [rosuvastatin calcium] and Digoxin    Social History:   reports that he has never smoked. He has never used smokeless tobacco. He reports current alcohol use. He reports that he does not use drugs. Family History: family history includes Cancer in his mother. All 14 point review of systems are completed and pertinent positives are mentioned in the history of present illness. Other systems are reviewed and are negative. PHYSICAL EXAM:    Vital signs:    /64   Pulse 73   Ht 5' 10\" (1.778 m)   Wt 180 lb 3.2 oz (81.7 kg)   SpO2 98%   BMI 25.86 kg/m²      Constitutional and general appearance: alert, cooperative, no distress, and appears stated age  HEENT: PERRL, no cervical lymphadenopathy. No masses palpable.  Normal oral mucosa  Respiratory:  Normal excursion and expansion without use of accessory muscles  Resp auscultation: Normal breath sounds without wheezing, rhonchi, and rales  Cardiovascular: The apical impulse is not displaced  Heart tones are crisp and normal. regular S1 and S2.  Jugular venous pulsation Normal  The carotid upstroke is normal in amplitude and contour without delay or bruit  Peripheral pulses are symmetrical and full   Abdomen:  No masses or tenderness  Bowel sounds present  Extremities:   No cyanosis or clubbing   No lower extremity edema   Skin: warm and dry  Neurological:  Alert and oriented  Moves all extremities well  No abnormalities of mood, affect, memory, mentation, or behavior are noted    DATA:    Limited echo 7/2022:  Conclusions      Summary   Limited exam performed for palpitations. PVC's are noted throughout the   study. Left ventricular systolic function is normal with ejection fraction   estimated at 55-60%. Sub-optimal endocardial definition and frequent PVC's, within the limits of   the study, No obvious regional wall motion abnormalities. A bioprosthetic aortic valve appears well seated with a peak velocity of   2.59 msec., a maximum gradient of 26.83 mmHg and a mean gradient of 15 mmHg. Mild bettie-valvular aortic regurgitation. Mild mitral regurgitation. Mild pulmonic regurgitation. Mild tricuspid regurgitation. Systolic pulmonary artery pressure (SPAP) is estimated at 41 mmHg consistent   with mild pulmonary hypertension (Right atrial pressure of 3 mmHg). Echo 11/2021  Summary   LV systolic function is normal with EF estimated at 55%. No regional wall motion abnormalities. Grade II diastolic dysfunction with elevated LV filling pressure. Mild bi-atrial enlargement. A bioprosthetic artificial aortic valve appears well seated and normal   functioning. Mild-moderate mitral regurgitation. Mild eccentric aortic regurgitation.    Mild tricuspid and pulmonic regurgitation. Systolic pulmonary artery pressure (SPAP) is estimated at 36 mmHg (RAP of 3   mmHg). ALEJANDRINA 2/2019   Summary   Left ventricular systolic function is normal with ejection fraction   estimated at 55%. No regional wall motion abnormalities. There is concentric   left ventricular hypertrophy. The left atrium is dilated. There is no evidence of mass or thrombus in the left atrium or appendage. Echo 1/2019  Summary   Left ventricular systolic function is normal with ejection fraction   estimated at 55%. No regional wall motion abnormalities. There is moderate concentric left ventricular hypertrophy. Grade II diastolic dysfunction with elevated left ventricular filling   pressure. Moderate bi-atrial enlargement. A bioprosthetic aortic valve appears well seated with normal doppler   parameters for valve. Mild to moderate tricuspid regurgitation. Systolic pulmonary artery pressure (SPAP) is estimated at 46 mmHg consistent   with mild pulmonary hypertension (Right atrial pressure of 3 mmHg). Stress test 1/2019  Summary Unable to assess LVEF and wall motion due to afib with rapid rates  Perfusion images show areas of reversibility in the inferolateral wall  consistent with ischemia of this territory. This would be an intermediate to high risk study    All labs and testing reviewed. CARDIOLOGY LABS:   CBC: No results for input(s): WBC, HGB, HCT, PLT in the last 72 hours. BMP: No results for input(s): NA, K, CO2, BUN, CREATININE, LABGLOM, GLUCOSE in the last 72 hours. PT/INR: No results for input(s): PROTIME, INR in the last 72 hours. APTT:No results for input(s): APTT in the last 72 hours.   FASTING LIPID PANEL:  Lab Results   Component Value Date/Time    HDL 54 05/22/2022 06:30 AM    HDL 68 03/14/2011 11:10 AM    LDLDIRECT 90 05/06/2019 08:21 AM    LDLCALC 124 05/22/2022 06:30 AM    TRIG 99 05/22/2022 06:30 AM     LIVER PROFILE:No results for input(s): AST, ALT, ALB in the last 72 hours. IMPRESSION:    Patient Active Problem List   Diagnosis    Heart valve replaced by other means    Cardiac pacemaker in situ    Atrioventricular block, complete (HCC)    Paroxysmal atrial fibrillation (HCC)    Hypertension    Hyperlipidemia    Ventral hernia    S/P AVR (aortic valve replacement)    S/P ablation of atrial flutter    Chronic anticoagulation    Typical atrial flutter (HCC)    S/P VSD closure    Acquired hypothyroidism    Nonobstructive atherosclerosis of coronary artery    Benign prostatic hyperplasia with lower urinary tract symptoms    Urinary incontinence    Preop examination     Assessment:   Transient complete heart block: ongoing   -s/p dual chamber pacemaker implant 2/2014  -device check per HPI  Paroxysmal atrial arrhythmias (AF, AFL): ongoing  -[id number] score: 2 (age)   -s/p RFCA of atrial flutter 2012  -s/p RFCA with pulmonary vein isolation 2019   -<1% AF burden on device interrogation today  PVC's: ongoing   -asymptomatic    -30% burden on cardiac monitor  5/2022  S/p AVR 2006  S/p VSD repair 2006        Plan:   1. Continue Toprol, Warfarin, aspirin, Cardizem, Lasix, and Zetia  2. Annual CBC due 10/2023  3. Remote device transmissions every three months    4. Arrange yearly follow up with Dr. Ty Kramer  5.  Follow up with EP in 6 months or sooner if needed      MDM SALVADOR Dejesus-CNP  [address]  [phone]

## 2022-11-03 NOTE — PROGRESS NOTES
Patient presents to the device clinic today for a programming evaluation for his pacemaker. Patient has a history of CHB, pAF, and A Flutter. Takes Cardizem, Toprol XL, and coumadin. Last device interrogation was on 8/22. Since then, AT/AF burden measures <1%. HVR events recorded appear to show 1:1 AVC - #15 shows NSVT. PMT noted. P wave: 3.6 mV  R wave: 3.4 mV    AP 44%  <1%    All sensing and pacing parameters are within normal range. No changes need to be made at this time. Patient will see CRISTINA Varela in office today. Patient education was provided about device functionality, in home monitoring, and any other patient questions and/or concerns were addressed. Patient voices understanding. Patient will follow up in 3 months in office or remotely. Please see interrogation for more detail - Paceart report located under the Cardiology tab.

## 2022-11-03 NOTE — PATIENT INSTRUCTIONS
No changes today, continue your current medications    Remote device transmissions every three months      Arrange yearly follow up with Dr. Abiodun Prince    Follow up with us in 6 months or sooner if needed

## 2022-11-04 PROBLEM — Z01.818 PREOP EXAMINATION: Status: RESOLVED | Noted: 2022-10-05 | Resolved: 2022-11-04

## 2022-11-07 ENCOUNTER — ANTI-COAG VISIT (OUTPATIENT)
Dept: CARDIOLOGY CLINIC | Age: 77
End: 2022-11-07
Payer: MEDICARE

## 2022-11-07 LAB — INR BLD: 4

## 2022-11-07 PROCEDURE — 93793 ANTICOAG MGMT PT WARFARIN: CPT | Performed by: NURSE PRACTITIONER

## 2022-11-10 ENCOUNTER — TELEPHONE (OUTPATIENT)
Dept: PRIMARY CARE CLINIC | Age: 77
End: 2022-11-10

## 2022-11-10 NOTE — TELEPHONE ENCOUNTER
----- Message from Kevon Calle MD sent at 11/10/2022  5:20 PM EST -----  Regarding: Pre-op  Patient will need pre-op for cataract surgery.

## 2022-11-14 LAB — INR BLD: 2.1

## 2022-11-14 PROCEDURE — 93793 ANTICOAG MGMT PT WARFARIN: CPT | Performed by: NURSE PRACTITIONER

## 2022-11-15 ENCOUNTER — ANTI-COAG VISIT (OUTPATIENT)
Dept: CARDIOLOGY CLINIC | Age: 77
End: 2022-11-15
Payer: MEDICARE

## 2022-11-21 ENCOUNTER — NURSE ONLY (OUTPATIENT)
Dept: CARDIOLOGY CLINIC | Age: 77
End: 2022-11-21
Payer: MEDICARE

## 2022-11-21 ENCOUNTER — TELEPHONE (OUTPATIENT)
Dept: CARDIOLOGY CLINIC | Age: 77
End: 2022-11-21

## 2022-11-21 ENCOUNTER — OFFICE VISIT (OUTPATIENT)
Dept: PRIMARY CARE CLINIC | Age: 77
End: 2022-11-21
Payer: MEDICARE

## 2022-11-21 VITALS
BODY MASS INDEX: 25.77 KG/M2 | HEART RATE: 71 BPM | HEIGHT: 70 IN | DIASTOLIC BLOOD PRESSURE: 58 MMHG | RESPIRATION RATE: 14 BRPM | OXYGEN SATURATION: 97 % | SYSTOLIC BLOOD PRESSURE: 104 MMHG | WEIGHT: 180 LBS

## 2022-11-21 DIAGNOSIS — H25.9 SENILE CATARACT OF LEFT EYE, UNSPECIFIED AGE-RELATED CATARACT TYPE: Primary | ICD-10-CM

## 2022-11-21 DIAGNOSIS — E78.2 MIXED HYPERLIPIDEMIA: ICD-10-CM

## 2022-11-21 DIAGNOSIS — I44.2 ATRIOVENTRICULAR BLOCK, COMPLETE (HCC): Primary | Chronic | ICD-10-CM

## 2022-11-21 DIAGNOSIS — Z95.0 PACEMAKER: ICD-10-CM

## 2022-11-21 DIAGNOSIS — Z01.818 PREOP EXAMINATION: ICD-10-CM

## 2022-11-21 LAB — INR BLD: 2.6

## 2022-11-21 PROCEDURE — 1123F ACP DISCUSS/DSCN MKR DOCD: CPT | Performed by: INTERNAL MEDICINE

## 2022-11-21 PROCEDURE — 99214 OFFICE O/P EST MOD 30 MIN: CPT | Performed by: INTERNAL MEDICINE

## 2022-11-21 PROCEDURE — G8417 CALC BMI ABV UP PARAM F/U: HCPCS | Performed by: INTERNAL MEDICINE

## 2022-11-21 PROCEDURE — 3074F SYST BP LT 130 MM HG: CPT | Performed by: INTERNAL MEDICINE

## 2022-11-21 PROCEDURE — 1036F TOBACCO NON-USER: CPT | Performed by: INTERNAL MEDICINE

## 2022-11-21 PROCEDURE — 93793 ANTICOAG MGMT PT WARFARIN: CPT | Performed by: NURSE PRACTITIONER

## 2022-11-21 PROCEDURE — 93294 REM INTERROG EVL PM/LDLS PM: CPT | Performed by: INTERNAL MEDICINE

## 2022-11-21 PROCEDURE — G8427 DOCREV CUR MEDS BY ELIG CLIN: HCPCS | Performed by: INTERNAL MEDICINE

## 2022-11-21 PROCEDURE — 3078F DIAST BP <80 MM HG: CPT | Performed by: INTERNAL MEDICINE

## 2022-11-21 PROCEDURE — G8484 FLU IMMUNIZE NO ADMIN: HCPCS | Performed by: INTERNAL MEDICINE

## 2022-11-21 PROCEDURE — 93296 REM INTERROG EVL PM/IDS: CPT | Performed by: INTERNAL MEDICINE

## 2022-11-21 RX ORDER — EZETIMIBE 10 MG/1
10 TABLET ORAL DAILY
Qty: 90 TABLET | Refills: 3 | Status: SHIPPED | OUTPATIENT
Start: 2022-11-21

## 2022-11-21 ASSESSMENT — ENCOUNTER SYMPTOMS
COLOR CHANGE: 0
VOMITING: 0
SORE THROAT: 0
ABDOMINAL DISTENTION: 0
EYE PAIN: 0
ABDOMINAL PAIN: 0
BLOOD IN STOOL: 0
TROUBLE SWALLOWING: 0
COUGH: 0
SINUS PRESSURE: 0
CONSTIPATION: 0
DIARRHEA: 0
EYE REDNESS: 0
NAUSEA: 0
BACK PAIN: 0
CHEST TIGHTNESS: 0
WHEEZING: 0
SHORTNESS OF BREATH: 0

## 2022-11-21 NOTE — TELEPHONE ENCOUNTER
CARDIAC CLEARANCE REQUEST    What type of procedure are you having: left cataract surgery     Are you taking any blood thinners: yes but not required to stop     Type on anesthesia: local     When is your procedure scheduled for: 12/15/2022    What physician is performing your procedure:Dr. Neftali Gagnon     Phone Number: [phone]    Fax number to send the letter: [phone]

## 2022-11-21 NOTE — LETTER
[address] Cardiology - [address]  Phone: [phone]  Fax: [phone]    SALVADOR Roldan CNP        November 28, 2022    Lennox Mathis   1945  [facility] [place] [address]      Dear To Whom This May Concern,    Thao Rogers is at an increased risk of perioperative complications due to multiple comorbidities. However, there are no absolute contraindications to proceeding    If you have any questions or concerns, please don't hesitate to call.     Sincerely,        SALVADOR Roldan CNP

## 2022-11-21 NOTE — PROGRESS NOTES
We received remote transmission from patient's monitor at home. Transmission shows normal sensing and pacing function. Noted AF and AT. Pt remains on coumadin and Toprol. Noted far field sensing. EP physician will review. See interrogation under cardiology tab in the [address] [place] [address] [address] field for more details. AP 44%   <1%    End of 91-day monitoring period 11-21-22.

## 2022-11-21 NOTE — PROGRESS NOTES
Preoperative Assessment    Date of Surgery:   12/15/22    Reason for Surgery: L cataract  Chief Complaint: Blurred vision    This patient presents to the office today for a preoperative consultation at the request of surgeon, Dr. Hector Singh       Planned anesthesia:   MAC  Prior anesthesia:   YES  Prior anesthesia: YES  Known anesthesia problems:  NO  Recent corticosteroid use  NO  Blood thinner use YES  H/o MARY  NO    Recent symptoms idoes not include fever, chills, chest pain, cough, dyspnea, dysuria, urinary frequency, nausea, vomiting, diarrhea, abdominal pain, easy bruising, lower extremity swelling or poor exercise tolerance. Pertinent medical history includes prior anesthesia, frequent aspirin use, Paroxysmal A Fib on warfarin, H/o Complee AV Block, s/p A Flutter,  HTN,  Non-obstructive CAD, Acquired Hypothyroidism while pertinent medical history does not include previous anesthesia reaction, diabetes, pulmonary disease, renal disease, sleep apnea, thromboembolic problems, clotting disorder, bleeding disorder, transfusion reaction, impaired immunity or corticosteroid use in the last six months. Pertinent family history  does not include anesthesia reaction, myocardial infarction, aneurysm, sudden death, clotting disorder or bleeding disorder. Pertinent social history does  include warfarin, ASA, does not include drug use, tobacco use  alcohol use, illicit drug use, transfusion refusal, wearing dentures or partial plates or concerns regarding care after surgery. After surgery the patient plans to recover at home with family.     Has Acute Coronary Syndrome (ACS): No  Functional Capacity -4 METS  Revised cardiac risk index - 1   Functional Capacity 4+ METS      Bleeding risk: Anticoagulant therapy- On warfarin an Aspirin  Past Surgical History:   Procedure Laterality Date    ATRIAL ABLATION SURGERY  12-28-12    ablation of IVC-Tricuspid Valve    ATRIAL ABLATION SURGERY  11/2019    CARDIAC SURGERY  2009    AVR pig CYST REMOVAL  1972    DIAGNOSTIC CARDIAC CATH LAB PROCEDURE      PACEMAKER INSERTION      A-fib/flutter    SKIN BIOPSY      VENTRAL HERNIA REPAIR        Allergies   Allergen Reactions    Crestor [Rosuvastatin Calcium] Rash    Digoxin Hives      Recent Labs:  CBC:   Lab Results   Component Value Date/Time    WBC 5.5 10/05/2022 10:27 AM    HGB 13.6 10/05/2022 10:27 AM    HCT 40.8 10/05/2022 10:27 AM    MCH 32.2 10/05/2022 10:27 AM    MCHC 33.3 10/05/2022 10:27 AM    RDW 14.8 10/05/2022 10:27 AM     10/05/2022 10:27 AM    MPV 8.3 10/05/2022 10:27 AM     CMP:   Lab Results   Component Value Date/Time     10/05/2022 10:27 AM    K 4.4 10/05/2022 10:27 AM    K 3.9 08/31/2020 05:28 AM     10/05/2022 10:27 AM    CO2 26 10/05/2022 10:27 AM    ANIONGAP 12 10/05/2022 10:27 AM    GLUCOSE 90 10/05/2022 10:27 AM    BUN 27 10/05/2022 10:27 AM    CREATININE 0.8 10/05/2022 10:27 AM    GFRAA >60 10/05/2022 10:27 AM    GFRAA >60 10/11/2011 01:48 PM    CALCIUM 9.2 10/05/2022 10:27 AM    PROT 7.3 05/22/2022 06:30 AM    PROT 6.8 10/11/2011 01:48 PM    LABALBU 5.0 10/05/2022 10:27 AM    AGRATIO 1.8 05/22/2022 06:30 AM    BILITOT 0.9 05/22/2022 06:30 AM    ALKPHOS 81 05/22/2022 06:30 AM    ALT 17 05/22/2022 06:30 AM    AST 22 05/22/2022 06:30 AM    GLOB 2.9 02/13/2019 07:05 AM     EKG Interpretation:      Review of Systems   Constitutional:  Negative for activity change, appetite change, chills, fatigue, fever and unexpected weight change. HENT:  Negative for congestion, ear pain, postnasal drip, sinus pressure, sore throat, tinnitus and trouble swallowing. Eyes:  Negative for pain and redness. Respiratory:  Negative for cough, chest tightness, shortness of breath and wheezing. Cardiovascular:  Negative for chest pain, palpitations and leg swelling. Gastrointestinal:  Negative for abdominal distention, abdominal pain, blood in stool, constipation, diarrhea, nausea and vomiting.    Endocrine: Negative for cold intolerance, heat intolerance and polydipsia. Genitourinary:  Negative for decreased urine volume, dysuria, flank pain, frequency, hematuria and urgency. Musculoskeletal:  Negative for arthralgias, back pain, joint swelling, neck pain and neck stiffness. Skin:  Negative for color change and rash. Neurological:  Negative for dizziness, weakness, numbness and headaches. Hematological:  Negative for adenopathy. Psychiatric/Behavioral:  Negative for behavioral problems, sleep disturbance and suicidal ideas. The patient is not nervous/anxious. BP (!) 104/58 (Site: Left Upper Arm, Position: Sitting, Cuff Size: Large Adult)   Pulse 71   Resp 14   Ht 5' 10\" (1.778 m)   Wt 180 lb (81.6 kg)   SpO2 97%   BMI 25.83 kg/m²    Physical Exam  Constitutional:       General: He is not in acute distress. Appearance: Normal appearance. He is not ill-appearing. HENT:      Head: Normocephalic and atraumatic. Right Ear: Tympanic membrane, ear canal and external ear normal. There is no impacted cerumen. Left Ear: Tympanic membrane, ear canal and external ear normal. There is no impacted cerumen. Mouth/Throat:      Mouth: Mucous membranes are moist.      Pharynx: No oropharyngeal exudate or posterior oropharyngeal erythema. Eyes:      Extraocular Movements: Extraocular movements intact. Conjunctiva/sclera: Conjunctivae normal.      Pupils: Pupils are equal, round, and reactive to light. Neck:      Vascular: No carotid bruit. Cardiovascular:      Rate and Rhythm: Normal rate and regular rhythm. Pulses: Normal pulses. Heart sounds: Normal heart sounds. No murmur heard. No gallop. Pulmonary:      Effort: Pulmonary effort is normal.      Breath sounds: Normal breath sounds. No wheezing, rhonchi or rales. Abdominal:      General: Abdomen is flat. Bowel sounds are normal. There is no distension. Palpations: Abdomen is soft. Tenderness:  There is no abdominal tenderness. There is no guarding or rebound. Musculoskeletal:         General: No swelling or tenderness. Normal range of motion. Cervical back: No tenderness. Lymphadenopathy:      Cervical: No cervical adenopathy. Skin:     Findings: No erythema, lesion or rash. Neurological:      General: No focal deficit present. Mental Status: He is alert and oriented to person, place, and time. Mental status is at baseline. Cranial Nerves: No cranial nerve deficit. Motor: No weakness. Psychiatric:         Mood and Affect: Mood normal.         Behavior: Behavior normal.         Thought Content: Thought content normal.         Judgment: Judgment normal.        Assessment:      1. Senile cataract of left eye, unspecified age-related cataract type  Assessment & Plan:   Patient diagnosed in the past with the same   Patient scheduled to have surgery   Patient here for H&P  Follow up with specialist as per their orders   2. Preop examination  Assessment & Plan:  See above    3. Mixed hyperlipidemia  -     ezetimibe (ZETIA) 10 MG tablet; Take 1 tablet by mouth daily, Disp-90 tablet, R-3Normal   68 year old patient with planned surgery as above. Known risk factors for perioperative complications: Paroxysmal A Fib on warfarin, H/o Complee AV Block, s/p A Flutter,  HTN,  Non-obstructive CAD, Acquired Hypothyroidism     Plan:    1. Preoperative workup as follows: Labs, ECG, COVID-19 swab  2. Change in medication regimen before surgery: As per surgeon and Cardiologist.   3. Prophylaxis for cardiac events with perioperative beta-blockers:  Please take beta blocker dose  night before  surgery per usual regimen. Patient has no cardiorespiratory symptoms with Revised cardiac risk index of 1, METS 4. Patient is at moderate risk for a low risk NON-cardiac procedure.   Patient will obtain official cardiac clearance from cardiologist.     Follow-up 5/21/2023 for Medicare AWV

## 2022-11-21 NOTE — ASSESSMENT & PLAN NOTE
Patient diagnosed in the past with the same   Patient scheduled to have surgery   Patient here for H&P  Follow up with specialist as per their orders

## 2022-11-22 ENCOUNTER — ANTI-COAG VISIT (OUTPATIENT)
Dept: CARDIOLOGY CLINIC | Age: 77
End: 2022-11-22
Payer: MEDICARE

## 2022-11-22 DIAGNOSIS — Z79.01 CHRONIC ANTICOAGULATION: Primary | ICD-10-CM

## 2022-11-22 NOTE — TELEPHONE ENCOUNTER
He is at an increased risk of perioperative complications due to multiple comorbidities. However, there are no absolute contraindications to proceeding.

## 2022-11-30 ENCOUNTER — TELEPHONE (OUTPATIENT)
Dept: PRIMARY CARE CLINIC | Age: 77
End: 2022-11-30

## 2022-11-30 DIAGNOSIS — E03.9 ACQUIRED HYPOTHYROIDISM: ICD-10-CM

## 2022-11-30 DIAGNOSIS — I48.0 PAROXYSMAL ATRIAL FIBRILLATION (HCC): ICD-10-CM

## 2022-11-30 RX ORDER — LEVOTHYROXINE SODIUM 88 UG/1
88 TABLET ORAL DAILY
Qty: 90 TABLET | Refills: 1 | Status: SHIPPED | OUTPATIENT
Start: 2022-11-30

## 2022-11-30 RX ORDER — WARFARIN SODIUM 2.5 MG/1
TABLET ORAL
Qty: 180 TABLET | Refills: 3 | Status: SHIPPED | OUTPATIENT
Start: 2022-11-30 | End: 2022-12-02 | Stop reason: SDUPTHER

## 2022-11-30 NOTE — TELEPHONE ENCOUNTER
Medication Refill    Medication needing refilled: Warfarin    Dosage of the medication: 2.5 MG    How are you taking this medication (QD, BID, TID, QID, PRN): TAKE TWO TABS BY MOUTH DAILY OR AS DIRECTED    30 or 90 day supply called in: 80 DAY    When will you run out of your medication: [address] [place] [address] are we sending the medication to?:    [facility]/pharmacy #[id number]- [place], [place] - [address] [facility] [phone] - F [phone]   [facility] [address] [address]   Phone:  [phone]  Fax:  [phone]

## 2022-12-01 NOTE — TELEPHONE ENCOUNTER
Med sent to wrong pharmacy. Wife upset. Please send to [facility] in [place]. Information in 1st message.  TY

## 2022-12-02 DIAGNOSIS — I48.0 PAROXYSMAL ATRIAL FIBRILLATION (HCC): ICD-10-CM

## 2022-12-02 RX ORDER — WARFARIN SODIUM 2.5 MG/1
TABLET ORAL
Qty: 180 TABLET | Refills: 3 | Status: SHIPPED | OUTPATIENT
Start: 2022-12-02

## 2022-12-05 LAB — INR BLD: 3

## 2022-12-06 ENCOUNTER — ANTI-COAG VISIT (OUTPATIENT)
Dept: CARDIOLOGY CLINIC | Age: 77
End: 2022-12-06
Payer: MEDICARE

## 2022-12-06 PROCEDURE — 93793 ANTICOAG MGMT PT WARFARIN: CPT | Performed by: NURSE PRACTITIONER

## 2022-12-19 ENCOUNTER — ANTI-COAG VISIT (OUTPATIENT)
Dept: CARDIOLOGY CLINIC | Age: 77
End: 2022-12-19
Payer: MEDICARE

## 2022-12-19 DIAGNOSIS — Z79.01 CHRONIC ANTICOAGULATION: Primary | ICD-10-CM

## 2022-12-19 LAB — INR BLD: 2.8

## 2022-12-19 PROCEDURE — 93793 ANTICOAG MGMT PT WARFARIN: CPT | Performed by: NURSE PRACTITIONER

## 2022-12-21 PROBLEM — Z01.818 PREOP EXAMINATION: Status: RESOLVED | Noted: 2022-10-05 | Resolved: 2022-12-21

## 2023-01-02 LAB — INR BLD: 2.9

## 2023-01-02 PROCEDURE — 93793 ANTICOAG MGMT PT WARFARIN: CPT | Performed by: NURSE PRACTITIONER

## 2023-01-06 ENCOUNTER — ANTI-COAG VISIT (OUTPATIENT)
Dept: CARDIOLOGY CLINIC | Age: 78
End: 2023-01-06
Payer: MEDICARE

## 2023-01-06 DIAGNOSIS — Z95.2 S/P AVR (AORTIC VALVE REPLACEMENT): Primary | Chronic | ICD-10-CM

## 2023-01-16 ENCOUNTER — ANTI-COAG VISIT (OUTPATIENT)
Dept: CARDIOLOGY CLINIC | Age: 78
End: 2023-01-16
Payer: MEDICARE

## 2023-01-16 DIAGNOSIS — Z79.01 ANTICOAGULATED ON WARFARIN: Primary | ICD-10-CM

## 2023-01-16 LAB — INR BLD: 2.9

## 2023-01-16 PROCEDURE — 93793 ANTICOAG MGMT PT WARFARIN: CPT | Performed by: NURSE PRACTITIONER

## 2023-01-30 ENCOUNTER — ANTI-COAG VISIT (OUTPATIENT)
Dept: CARDIOLOGY CLINIC | Age: 78
End: 2023-01-30
Payer: MEDICARE

## 2023-01-30 DIAGNOSIS — Z79.01 CHRONIC ANTICOAGULATION: Primary | ICD-10-CM

## 2023-01-30 LAB — INR BLD: 3.5

## 2023-01-30 PROCEDURE — 93793 ANTICOAG MGMT PT WARFARIN: CPT | Performed by: NURSE PRACTITIONER

## 2023-02-06 LAB — INR BLD: 2.8

## 2023-02-06 PROCEDURE — 93793 ANTICOAG MGMT PT WARFARIN: CPT | Performed by: NURSE PRACTITIONER

## 2023-02-09 ENCOUNTER — ANTI-COAG VISIT (OUTPATIENT)
Dept: CARDIOLOGY CLINIC | Age: 78
End: 2023-02-09
Payer: MEDICARE

## 2023-02-09 DIAGNOSIS — I48.0 PAROXYSMAL ATRIAL FIBRILLATION (HCC): Primary | Chronic | ICD-10-CM

## 2023-02-21 ENCOUNTER — NURSE ONLY (OUTPATIENT)
Dept: CARDIOLOGY CLINIC | Age: 78
End: 2023-02-21
Payer: MEDICARE

## 2023-02-21 DIAGNOSIS — Z95.0 PACEMAKER: ICD-10-CM

## 2023-02-21 DIAGNOSIS — I44.2 ATRIOVENTRICULAR BLOCK, COMPLETE (HCC): Primary | Chronic | ICD-10-CM

## 2023-02-21 PROCEDURE — 93294 REM INTERROG EVL PM/LDLS PM: CPT | Performed by: INTERNAL MEDICINE

## 2023-02-21 PROCEDURE — 93296 REM INTERROG EVL PM/IDS: CPT | Performed by: INTERNAL MEDICINE

## 2023-02-21 NOTE — PROGRESS NOTES
We received remote transmission from patient's monitor at home. Transmission shows normal sensing and pacing function. Noted AF and AT. Pt remains on coumadin and Toprol. Noted far field sensing. EP physician will review. See interrogation under cardiology tab in the [address] [place] [address] [address] field for more details. AP 45%   <1%    End of 91-day monitoring period 2-21-23.

## 2023-03-01 NOTE — PROGRESS NOTES
[address]   Cardiac Follow Up    Referring Provider:  Chevy Kumar. Rut Patel MD     Chief Complaint   Patient presents with    Follow-up     Yearly office visit    Coronary Artery Disease    Hypertension    Hyperlipidemia    Other     No new concerns        Subjective:  Patient being seen today for routine cardiology follow up of HTN, HLD, PAF, PPM; no complaints today    Past Medical History:   Mr Manjarrez Divers 74yo male who has PMH porcine AVR and VSD closure in 12/46/93 complicated by post-op AV block s/p , frequent PVC's (Dr. Ursula Veronica), PAF/flutter s/p RFA, HLD, and NOCAD. He had RFA for aflutter on 12/28/12 and again 2/13/19 by Dr. Gayle Houston (on coumadin regulated by our office--has home machine). Prior took amiodarone but now d/c'd and hx chronically elevated LFT's. He was NOT on statin med per Dr. David Pimentel prior due to prior elevated LFT's on amiodarone. He had rash with crestor and refuses further statins. Most recent Stress test 1/9/19 abnormal. LHC 1/11/19 LM <10% LAD prox, mid-distal <10%, Lcx prox-mid 60-70%, RCA prox-mid 20-30% mid distal 50-60%. Moderate LCx/RCA disease. Medical mgt without PCI recommended. Most recent 24 hour 5/13/2022 noted SR with occasional; brief PAT; very frequent mostly uniform PVC' (30% burden). Limited Echo 7/5/2022 with PVC's are noted throughout the study; EF=55-60% (same 11/21, 1/19); A bioprosthetic aortic valve appears well seated with a peak velocity of 2.59 msec; MPG 15 mmHg; Mild bettie-valvular aortic regurgitation; Mild MR/MI/TR. Most recent Device check 2/21/2023 PARVIN 10 month; AP 45%;  <1%. Today, he reports doing well. Ne notes occasional edema of feet. Patient denies chest pain, sob, palpitations, dizziness or syncope. He is compliant with medications. Tolerates well. He notes that he does not watch what he eats. He eats steak about 2-3x per week and hamburgers. He refuses statins and does not want Repatha or Leqvio at this time.      Past Medical History:   has a past medical history of Arthritis, Atrial fibrillation ([address] CAD (coronary artery disease), Cardiac pacemaker, Hyperlipidemia, Hypertension, Mitral valve disease, and Thyroid disease. Surgical History:   has a past surgical history that includes Cardiac surgery (2009); cyst removal (1972); skin biopsy; Pacemaker insertion; Atrial ablation surgery (12-28-12); ventral hernia repair; Diagnostic Cardiac Cath Lab Procedure; and Atrial ablation surgery (11/2019). Social History:   , lives with spouse in [facility]. He is retired , and current  [facility] reports that he has never smoked. He has never used smokeless tobacco. He reports that he does not drink alcohol or use drugs. Family History:  family history includes Cancer in his mother. Home Medications:  Prior to Admission medications    Medication Sig Start Date End Date Taking? Authorizing Provider   warfarin (COUMADIN) 2.5 MG tablet Take two tablets by mouth daily or as directed 12/2/22  Yes Janes Martínez MD   levothyroxine (SYNTHROID) 88 MCG tablet Take 1 tablet by mouth daily 11/30/22  Yes Angie Garcia MD   ezetimibe (ZETIA) 10 MG tablet Take 1 tablet by mouth daily 11/21/22  Yes Angie Garcia MD   finasteride (PROSCAR) 5 MG tablet Take 5 mg by mouth daily 9/23/22  Yes Historical Provider, MD   furosemide (LASIX) 40 MG tablet Take 1 tablet by mouth  daily 5/18/22  Yes Janes Martínez MD   metoprolol succinate (TOPROL XL) 100 MG extended release tablet Take 1 tablet by mouth nightly 5/18/22  Yes Janes Martínez MD   dilTIAZem [facility] CD) 180 MG extended release capsule TAKE 1 CAPSULE DAILY 2/21/22  Yes Janes Martínez MD   aspirin EC 81 MG EC tablet Take 1 tablet by mouth daily 12/6/18  Yes Janes Martínez MD   Multiple Vitamins-Minerals (MULTIVITAMIN PO) Take 1 tablet by mouth daily.    Yes Historical Provider, MD   tamsulosin (FLOMAX) 0.4 MG capsule Take 0.4 mg by mouth daily. Yes Historical Provider, MD      Allergies:  Crestor [rosuvastatin calcium] and Digoxin     Review of Systems:   Constitutional: there has been no unanticipated weight loss. There's been no change in energy level, sleep pattern, or activity level. Eyes: No visual changes or diplopia. No scleral icterus. ENT: No Headaches, hearing loss or vertigo. No mouth sores or sore throat. Cardiovascular: Reviewed in HPI  Respiratory: No cough or wheezing, no sputum production. No hematemesis. Gastrointestinal: No abdominal pain, appetite loss, blood in stools. No change in bowel or bladder habits. Genitourinary: No dysuria, trouble voiding, or hematuria. Musculoskeletal:  No gait disturbance, weakness or joint complaints. Integumentary: No rash or pruritis. Neurological: No headache, diplopia, change in muscle strength, numbness or tingling. No change in gait, balance, coordination, mood, affect, memory, mentation, behavior. Psychiatric: No anxiety, no depression. Endocrine: No malaise, fatigue or temperature intolerance. No excessive thirst, fluid intake, or urination. No tremor. Hematologic/Lymphatic: No abnormal bruising or bleeding, blood clots or swollen lymph nodes. Allergic/Immunologic: No nasal congestion or hives. Physical Examination:    Vitals:    03/06/23 1259   BP: 116/60   Pulse: 59   Temp: 98.6 °F (37 °C)   SpO2: 98%      Height: 5' 10\" (1.778 m), Weight: 176 lb 12.8 oz (80.2 kg), BP: 116/60        Wt Readings from Last 3 Encounters:   03/06/23 176 lb 12.8 oz (80.2 kg)   11/21/22 180 lb (81.6 kg)   11/03/22 180 lb 3.2 oz (81.7 kg)       Constitutional and General Appearance: NAD   Respiratory:  Normal excursion and expansion without use of accessory muscles  Resp Auscultation: Normal breath sounds without dullness, clear, no crackles or wheezes  Cardiovascular:   The apical impulses not displaced  Heart tones are crisp and RRR with ectopy  Cervical veins are not engorged  The carotid upstroke is normal in amplitude and contour without delay or bruit  Normal S1S2, No S3, Soft 2/6 DIXIE  Peripheral pulses are symmetrical and full  There is no clubbing, cyanosis of the extremities.   Trace bilateral edema  Femoral Arteries: 2+ and equal  Pedal Pulses: 2+ and equal   Abdomen:  No masses or tenderness  Liver/Spleen: No Abnormalities Noted  Neurological/Psychiatric:  Alert and oriented in all spheres  Moves all extremities well  Exhibits normal gait balance and coordination  No abnormalities of mood, affect, memory, mentation, or behavior are noted  Skin:  Skin: warm and dry;       Lab Results   Component Value Date     10/05/2022    K 4.4 10/05/2022     10/05/2022    CO2 26 10/05/2022    BUN 27 (H) 10/05/2022    CREATININE 0.8 10/05/2022    GLUCOSE 90 10/05/2022    CALCIUM 9.2 10/05/2022    PROT 7.3 05/22/2022    LABALBU 5.0 10/05/2022    BILITOT 0.9 05/22/2022    ALKPHOS 81 05/22/2022    AST 22 05/22/2022    ALT 17 05/22/2022    LABGLOM >60 10/05/2022    GFRAA >60 10/05/2022    AGRATIO 1.8 05/22/2022    GLOB 2.9 02/13/2019       Lab Results   Component Value Date    WBC 5.5 10/05/2022    HGB 13.6 10/05/2022    HCT 40.8 10/05/2022    MCV 96.6 10/05/2022     10/05/2022     Lab Results   Component Value Date    TSH 2.56 10/05/2022    FT3 NA 08/04/2014    T4FREE 1.4 05/22/2022     Hemoglobin A1C   Date Value Ref Range Status   03/29/2019 5.8 % Final     Lab Results   Component Value Date    CHOL 198 05/22/2022    CHOL 203 05/07/2020    CHOL 179 05/06/2019     Lab Results   Component Value Date    TRIG 99 05/22/2022    TRIG 140 05/07/2020    TRIG 71 05/06/2019     Lab Results   Component Value Date    HDL 54 05/22/2022    HDL 58 05/07/2020    HDL 75 05/06/2019     Lab Results   Component Value Date    LDLCALC 124 (H) 05/22/2022    LDLCALC 117 05/07/2020    LDLCALC 177 (A) 03/29/2019     Lab Results   Component Value Date    LABVLDL 20 05/22/2022    LABVLDL 30 03/14/2011    VLDL 28 05/07/2020    VLDL 14 05/06/2019    VLDL 20 03/29/2019     No results found for: CHOLHDLRATIO      Assessment:     1. S/P AVR (aortic valve replacement):  S/P porcine AVR and VSD closure in 12/06. Most recent Echo 7/22 with normal EF and nml fcn bio. AVR. 2. Paroxysmal atrial fibrillation ([address]): Follows with EP partner. Most recent EP study ablation 2/13/19 with RFA of atrial fibrillation by Dr. Tayla Giles. 24 hour monitor May 2022 with 30% PVC burden. ECHO ordered afterwards showed normal EF and EP doc will not change regimen given no symptoms and EF OK. 3. Essential hypertension:  Well controlled and will continue current medical regimen. 4. Mixed hyperlipidemia: I personally reviewed most recent labs from 5/22/22 (see above). He had rash and dermatologist felt due to statin. Labs not at goal but he does NOT want statin, Repatha, or Leqvio and will continue zetia only. 5.      NOCAD:  [facility] 1/11/19 NOCAD; Moderate LCx/RCA disease correlating with stress test. Medical mgt without PCI recommended. There are no concerning symptoms for angina currently. He does NOT want to use SL NTG due to prior HA's. Plan:  1. Medications reviewed. Refills warranted. 2. Leqvio and Repatha discussed to take in addition to Zetia. He will discuss Leqvio with pcp  3. Call us when you have fasting labs and we will look for them. 4. Continue current course  5. No need for cardiac testing    Plan to follow up in one year. This note was scribed in the presence of Pily Hairston MD by Jose Spears RN. I, Dr. Araceli Lomeli, personally performed the services described in this documentation, as scribed by the above signed scribe in my presence. It is both accurate and complete to my knowledge.  I agree with the details independently gathered by the clinical support staff, while the remaining scribed note accurately describes my personal service to the patient    Cost of prescription medications and patient compliance have been reviewed with patient. All questions answered. Thank you for allowing me to participate in the care of this individual.    Ember Juarez.  Vignesh Arguello M.D., [address] [facility]

## 2023-03-06 ENCOUNTER — OFFICE VISIT (OUTPATIENT)
Dept: CARDIOLOGY CLINIC | Age: 78
End: 2023-03-06
Payer: MEDICARE

## 2023-03-06 VITALS
HEART RATE: 59 BPM | WEIGHT: 176.8 LBS | SYSTOLIC BLOOD PRESSURE: 116 MMHG | OXYGEN SATURATION: 98 % | TEMPERATURE: 98.6 F | BODY MASS INDEX: 25.31 KG/M2 | HEIGHT: 70 IN | DIASTOLIC BLOOD PRESSURE: 60 MMHG

## 2023-03-06 DIAGNOSIS — I10 PRIMARY HYPERTENSION: Chronic | ICD-10-CM

## 2023-03-06 DIAGNOSIS — I48.0 PAROXYSMAL ATRIAL FIBRILLATION (HCC): Chronic | ICD-10-CM

## 2023-03-06 DIAGNOSIS — I25.10 NONOBSTRUCTIVE ATHEROSCLEROSIS OF CORONARY ARTERY: ICD-10-CM

## 2023-03-06 DIAGNOSIS — I44.2 ATRIOVENTRICULAR BLOCK, COMPLETE (HCC): Primary | Chronic | ICD-10-CM

## 2023-03-06 DIAGNOSIS — E78.2 MIXED HYPERLIPIDEMIA: Chronic | ICD-10-CM

## 2023-03-06 LAB — INR BLD: 2.4

## 2023-03-06 PROCEDURE — 99214 OFFICE O/P EST MOD 30 MIN: CPT | Performed by: INTERNAL MEDICINE

## 2023-03-06 PROCEDURE — 93793 ANTICOAG MGMT PT WARFARIN: CPT | Performed by: NURSE PRACTITIONER

## 2023-03-06 PROCEDURE — G8427 DOCREV CUR MEDS BY ELIG CLIN: HCPCS | Performed by: INTERNAL MEDICINE

## 2023-03-06 PROCEDURE — 3074F SYST BP LT 130 MM HG: CPT | Performed by: INTERNAL MEDICINE

## 2023-03-06 PROCEDURE — G8417 CALC BMI ABV UP PARAM F/U: HCPCS | Performed by: INTERNAL MEDICINE

## 2023-03-06 PROCEDURE — 1123F ACP DISCUSS/DSCN MKR DOCD: CPT | Performed by: INTERNAL MEDICINE

## 2023-03-06 PROCEDURE — G8484 FLU IMMUNIZE NO ADMIN: HCPCS | Performed by: INTERNAL MEDICINE

## 2023-03-06 PROCEDURE — 1036F TOBACCO NON-USER: CPT | Performed by: INTERNAL MEDICINE

## 2023-03-06 PROCEDURE — 3078F DIAST BP <80 MM HG: CPT | Performed by: INTERNAL MEDICINE

## 2023-03-06 RX ORDER — DILTIAZEM HYDROCHLORIDE 180 MG/1
CAPSULE, COATED, EXTENDED RELEASE ORAL
Qty: 90 CAPSULE | Refills: 3 | Status: SHIPPED | OUTPATIENT
Start: 2023-03-06

## 2023-03-06 RX ORDER — FUROSEMIDE 40 MG/1
TABLET ORAL
Qty: 90 TABLET | Refills: 3 | Status: SHIPPED | OUTPATIENT
Start: 2023-03-06

## 2023-03-06 RX ORDER — METOPROLOL SUCCINATE 100 MG/1
100 TABLET, EXTENDED RELEASE ORAL NIGHTLY
Qty: 90 TABLET | Refills: 3 | Status: SHIPPED | OUTPATIENT
Start: 2023-03-06

## 2023-03-06 NOTE — PATIENT INSTRUCTIONS
1. Medications reviewed. Refills warranted. 2. Leqvio and Repatha discussed to take in addition to Zetia. He will discuss Leqvio with pcp  3. Call us when you have fasting labs and we will look for them. 4. Continue current course  5. No need for cardiac testing    Plan to follow up in one year.

## 2023-03-08 ENCOUNTER — ANTI-COAG VISIT (OUTPATIENT)
Dept: CARDIOLOGY CLINIC | Age: 78
End: 2023-03-08
Payer: MEDICARE

## 2023-03-08 DIAGNOSIS — Z95.2 S/P AVR (AORTIC VALVE REPLACEMENT): Primary | Chronic | ICD-10-CM

## 2023-03-20 ENCOUNTER — ANTI-COAG VISIT (OUTPATIENT)
Dept: CARDIOLOGY CLINIC | Age: 78
End: 2023-03-20
Payer: MEDICARE

## 2023-03-20 DIAGNOSIS — Z79.01 ANTICOAGULATED ON WARFARIN: Primary | ICD-10-CM

## 2023-03-20 LAB — INR BLD: 3.2

## 2023-03-20 PROCEDURE — 93793 ANTICOAG MGMT PT WARFARIN: CPT | Performed by: NURSE PRACTITIONER

## 2023-04-04 ENCOUNTER — ANTI-COAG VISIT (OUTPATIENT)
Dept: CARDIOLOGY CLINIC | Age: 78
End: 2023-04-04
Payer: MEDICARE

## 2023-04-04 DIAGNOSIS — Z79.01 ANTICOAGULATED ON WARFARIN: Primary | ICD-10-CM

## 2023-04-04 PROCEDURE — 93793 ANTICOAG MGMT PT WARFARIN: CPT | Performed by: NURSE PRACTITIONER

## 2023-04-17 ENCOUNTER — ANTI-COAG VISIT (OUTPATIENT)
Dept: CARDIOLOGY CLINIC | Age: 78
End: 2023-04-17
Payer: MEDICARE

## 2023-04-17 DIAGNOSIS — I48.0 PAROXYSMAL ATRIAL FIBRILLATION (HCC): Primary | Chronic | ICD-10-CM

## 2023-04-17 LAB — INR BLD: 2.4

## 2023-04-17 PROCEDURE — 93793 ANTICOAG MGMT PT WARFARIN: CPT | Performed by: NURSE PRACTITIONER

## 2023-05-02 LAB — INR BLD: 2.2

## 2023-05-02 PROCEDURE — 93793 ANTICOAG MGMT PT WARFARIN: CPT | Performed by: NURSE PRACTITIONER

## 2023-05-04 ENCOUNTER — ANTI-COAG VISIT (OUTPATIENT)
Dept: CARDIOLOGY CLINIC | Age: 78
End: 2023-05-04
Payer: MEDICARE

## 2023-05-04 DIAGNOSIS — Z79.01 ANTICOAGULATED ON WARFARIN: Primary | ICD-10-CM

## 2023-05-15 LAB — INR BLD: 3.1

## 2023-05-16 ENCOUNTER — ANTI-COAG VISIT (OUTPATIENT)
Dept: CARDIOLOGY CLINIC | Age: 78
End: 2023-05-16
Payer: MEDICARE

## 2023-05-16 DIAGNOSIS — I48.0 PAROXYSMAL ATRIAL FIBRILLATION (HCC): Primary | Chronic | ICD-10-CM

## 2023-05-16 PROCEDURE — 93793 ANTICOAG MGMT PT WARFARIN: CPT | Performed by: NURSE PRACTITIONER

## 2023-05-23 ENCOUNTER — NURSE ONLY (OUTPATIENT)
Dept: CARDIOLOGY CLINIC | Age: 78
End: 2023-05-23
Payer: MEDICARE

## 2023-05-23 DIAGNOSIS — Z95.0 PACEMAKER: ICD-10-CM

## 2023-05-23 DIAGNOSIS — I44.2 ATRIOVENTRICULAR BLOCK, COMPLETE (HCC): Primary | Chronic | ICD-10-CM

## 2023-05-23 PROCEDURE — 93296 REM INTERROG EVL PM/IDS: CPT | Performed by: INTERNAL MEDICINE

## 2023-05-23 PROCEDURE — 93294 REM INTERROG EVL PM/LDLS PM: CPT | Performed by: INTERNAL MEDICINE

## 2023-05-23 NOTE — PROGRESS NOTES
We received remote transmission from patient's monitor at home. Transmission shows normal sensing and pacing function. Noted AF and AT and far field sensing. Pt remains on coumadin and Toprol. Noted far field sensing. EP physician will review. See interrogation under cardiology tab in the [address] [place] [address] [address] field for more details. AP 41%   1.2%    End of 91-day monitoring period 5-23-23.

## 2023-05-27 DIAGNOSIS — E03.9 ACQUIRED HYPOTHYROIDISM: ICD-10-CM

## 2023-05-29 LAB — INR BLD: 3

## 2023-05-30 ENCOUNTER — ANTI-COAG VISIT (OUTPATIENT)
Dept: CARDIOLOGY CLINIC | Age: 78
End: 2023-05-30

## 2023-05-30 RX ORDER — LEVOTHYROXINE SODIUM 88 UG/1
TABLET ORAL
Qty: 90 TABLET | Refills: 1 | Status: SHIPPED | OUTPATIENT
Start: 2023-05-30

## 2023-06-03 DIAGNOSIS — E78.2 MIXED HYPERLIPIDEMIA: ICD-10-CM

## 2023-06-05 ENCOUNTER — TELEPHONE (OUTPATIENT)
Dept: CARDIOLOGY CLINIC | Age: 78
End: 2023-06-05

## 2023-06-05 RX ORDER — EZETIMIBE 10 MG/1
TABLET ORAL
Qty: 90 TABLET | Refills: 3 | Status: SHIPPED | OUTPATIENT
Start: 2023-06-05

## 2023-06-05 NOTE — TELEPHONE ENCOUNTER
LOV 3/6/2023    Received Fax request for cardiac clearance for cataract surgery on 6/22/2023 with Dr. Tarsha Redd    Fax # [phone]  John Colmenares

## 2023-06-06 NOTE — TELEPHONE ENCOUNTER
Intermediate risk clinically for low risk surgery. Proceed as planned. OK to hold coumadin for least amount of time possible.

## 2023-06-26 ENCOUNTER — ANTI-COAG VISIT (OUTPATIENT)
Dept: CARDIOLOGY CLINIC | Age: 78
End: 2023-06-26
Payer: MEDICARE

## 2023-06-26 DIAGNOSIS — Z95.2 S/P AVR (AORTIC VALVE REPLACEMENT): Primary | Chronic | ICD-10-CM

## 2023-06-26 LAB — INR BLD: 2.5

## 2023-06-26 PROCEDURE — 93793 ANTICOAG MGMT PT WARFARIN: CPT | Performed by: NURSE PRACTITIONER

## 2023-07-10 ENCOUNTER — ANTI-COAG VISIT (OUTPATIENT)
Dept: CARDIOLOGY CLINIC | Age: 78
End: 2023-07-10
Payer: MEDICARE

## 2023-07-10 DIAGNOSIS — I48.0 PAROXYSMAL ATRIAL FIBRILLATION (HCC): Chronic | ICD-10-CM

## 2023-07-10 DIAGNOSIS — Z95.2 S/P AVR (AORTIC VALVE REPLACEMENT): Primary | Chronic | ICD-10-CM

## 2023-07-10 LAB — INR BLD: 2

## 2023-07-10 PROCEDURE — 93793 ANTICOAG MGMT PT WARFARIN: CPT | Performed by: NURSE PRACTITIONER

## 2023-07-24 ENCOUNTER — ANTI-COAG VISIT (OUTPATIENT)
Dept: CARDIOLOGY CLINIC | Age: 78
End: 2023-07-24
Payer: MEDICARE

## 2023-07-24 DIAGNOSIS — I48.0 PAROXYSMAL ATRIAL FIBRILLATION (HCC): Primary | Chronic | ICD-10-CM

## 2023-07-24 LAB — INR BLD: 2.9

## 2023-07-24 PROCEDURE — 93793 ANTICOAG MGMT PT WARFARIN: CPT | Performed by: NURSE PRACTITIONER

## 2023-08-07 ENCOUNTER — ANTI-COAG VISIT (OUTPATIENT)
Dept: CARDIOLOGY CLINIC | Age: 78
End: 2023-08-07
Payer: MEDICARE

## 2023-08-07 DIAGNOSIS — Z95.2 S/P AVR (AORTIC VALVE REPLACEMENT): Chronic | ICD-10-CM

## 2023-08-07 DIAGNOSIS — I48.0 PAROXYSMAL ATRIAL FIBRILLATION (HCC): Primary | Chronic | ICD-10-CM

## 2023-08-07 LAB — INR BLD: 2.4

## 2023-08-07 PROCEDURE — 93793 ANTICOAG MGMT PT WARFARIN: CPT | Performed by: NURSE PRACTITIONER

## 2023-08-08 ENCOUNTER — APPOINTMENT (OUTPATIENT)
Dept: CT IMAGING | Age: 78
End: 2023-08-08
Payer: MEDICARE

## 2023-08-08 ENCOUNTER — HOSPITAL ENCOUNTER (EMERGENCY)
Age: 78
Discharge: ANOTHER HOSPITAL | End: 2023-08-08
Attending: EMERGENCY MEDICINE
Payer: MEDICARE

## 2023-08-08 ENCOUNTER — HOSPITAL ENCOUNTER (INPATIENT)
Age: 78
LOS: 1 days | Discharge: HOME | End: 2023-08-09
Attending: INTERNAL MEDICINE | Admitting: INTERNAL MEDICINE
Payer: MEDICARE

## 2023-08-08 VITALS
BODY MASS INDEX: 25.68 KG/M2 | RESPIRATION RATE: 16 BRPM | TEMPERATURE: 97.7 F | HEIGHT: 70 IN | OXYGEN SATURATION: 94 % | HEART RATE: 43 BPM | DIASTOLIC BLOOD PRESSURE: 68 MMHG | SYSTOLIC BLOOD PRESSURE: 135 MMHG | WEIGHT: 179.4 LBS

## 2023-08-08 DIAGNOSIS — Z87.448 HISTORY OF BLADDER STONE: ICD-10-CM

## 2023-08-08 DIAGNOSIS — N13.9 ACUTE URINARY OBSTRUCTION: Primary | ICD-10-CM

## 2023-08-08 DIAGNOSIS — N21.1 URETHRAL STONE: ICD-10-CM

## 2023-08-08 DIAGNOSIS — R33.8 ACUTE URINARY RETENTION: ICD-10-CM

## 2023-08-08 LAB
ALBUMIN SERPL-MCNC: 4.6 G/DL (ref 3.4–5)
ALBUMIN/GLOB SERPL: 1.7 {RATIO} (ref 1.1–2.2)
ALP SERPL-CCNC: 80 U/L (ref 40–129)
ALT SERPL-CCNC: 17 U/L (ref 10–40)
ANION GAP SERPL CALCULATED.3IONS-SCNC: 13 MMOL/L (ref 3–16)
AST SERPL-CCNC: 25 U/L (ref 15–37)
BACTERIA URNS QL MICRO: ABNORMAL /HPF
BASOPHILS # BLD: 0 K/UL (ref 0–0.2)
BASOPHILS NFR BLD: 0.6 %
BILIRUB SERPL-MCNC: 0.7 MG/DL (ref 0–1)
BILIRUB UR QL STRIP.AUTO: NEGATIVE
BUN SERPL-MCNC: 28 MG/DL (ref 7–20)
CALCIUM SERPL-MCNC: 9.4 MG/DL (ref 8.3–10.6)
CHLORIDE SERPL-SCNC: 102 MMOL/L (ref 99–110)
CLARITY UR: CLEAR
CO2 SERPL-SCNC: 26 MMOL/L (ref 21–32)
COLOR UR: ABNORMAL
CREAT SERPL-MCNC: 1.1 MG/DL (ref 0.8–1.3)
DEPRECATED RDW RBC AUTO: 14.3 % (ref 12.4–15.4)
EOSINOPHIL # BLD: 0.1 K/UL (ref 0–0.6)
EOSINOPHIL NFR BLD: 2.5 %
EPI CELLS #/AREA URNS HPF: ABNORMAL /HPF (ref 0–5)
GFR SERPLBLD CREATININE-BSD FMLA CKD-EPI: >60 ML/MIN/{1.73_M2}
GLUCOSE SERPL-MCNC: 112 MG/DL (ref 70–99)
GLUCOSE UR STRIP.AUTO-MCNC: NEGATIVE MG/DL
HCT VFR BLD AUTO: 39.7 % (ref 40.5–52.5)
HGB BLD-MCNC: 13.4 G/DL (ref 13.5–17.5)
HGB UR QL STRIP.AUTO: ABNORMAL
INR PPP: 2.33 (ref 0.84–1.16)
KETONES UR STRIP.AUTO-MCNC: NEGATIVE MG/DL
LEUKOCYTE ESTERASE UR QL STRIP.AUTO: NEGATIVE
LYMPHOCYTES # BLD: 0.7 K/UL (ref 1–5.1)
LYMPHOCYTES NFR BLD: 13.2 %
MCH RBC QN AUTO: 32 PG (ref 26–34)
MCHC RBC AUTO-ENTMCNC: 33.8 G/DL (ref 31–36)
MCV RBC AUTO: 94.6 FL (ref 80–100)
MONOCYTES # BLD: 0.5 K/UL (ref 0–1.3)
MONOCYTES NFR BLD: 9.2 %
NEUTROPHILS # BLD: 3.8 K/UL (ref 1.7–7.7)
NEUTROPHILS NFR BLD: 74.5 %
NITRITE UR QL STRIP.AUTO: NEGATIVE
PH UR STRIP.AUTO: 5.5 [PH] (ref 5–8)
PLATELET # BLD AUTO: 158 K/UL (ref 135–450)
PMV BLD AUTO: 7.9 FL (ref 5–10.5)
POTASSIUM SERPL-SCNC: 3.9 MMOL/L (ref 3.5–5.1)
PROT SERPL-MCNC: 7.3 G/DL (ref 6.4–8.2)
PROT UR STRIP.AUTO-MCNC: NEGATIVE MG/DL
PROTHROMBIN TIME: 25.4 SEC (ref 11.5–14.8)
RBC # BLD AUTO: 4.2 M/UL (ref 4.2–5.9)
RBC #/AREA URNS HPF: ABNORMAL /HPF (ref 0–4)
SODIUM SERPL-SCNC: 141 MMOL/L (ref 136–145)
SP GR UR STRIP.AUTO: 1.01 (ref 1–1.03)
UA COMPLETE W REFLEX CULTURE PNL UR: ABNORMAL
UA DIPSTICK W REFLEX MICRO PNL UR: YES
URN SPEC COLLECT METH UR: ABNORMAL
UROBILINOGEN UR STRIP-ACNC: 0.2 E.U./DL
WBC # BLD AUTO: 5.1 K/UL (ref 4–11)
WBC #/AREA URNS HPF: ABNORMAL /HPF (ref 0–5)

## 2023-08-08 PROCEDURE — 1200000000 HC SEMI PRIVATE

## 2023-08-08 PROCEDURE — 85610 PROTHROMBIN TIME: CPT

## 2023-08-08 PROCEDURE — 80053 COMPREHEN METABOLIC PANEL: CPT

## 2023-08-08 PROCEDURE — 6370000000 HC RX 637 (ALT 250 FOR IP): Performed by: INTERNAL MEDICINE

## 2023-08-08 PROCEDURE — 74176 CT ABD & PELVIS W/O CONTRAST: CPT

## 2023-08-08 PROCEDURE — 99285 EMERGENCY DEPT VISIT HI MDM: CPT

## 2023-08-08 PROCEDURE — 2580000003 HC RX 258: Performed by: INTERNAL MEDICINE

## 2023-08-08 PROCEDURE — 85025 COMPLETE CBC W/AUTO DIFF WBC: CPT

## 2023-08-08 PROCEDURE — 51798 US URINE CAPACITY MEASURE: CPT

## 2023-08-08 PROCEDURE — 81001 URINALYSIS AUTO W/SCOPE: CPT

## 2023-08-08 PROCEDURE — 36415 COLL VENOUS BLD VENIPUNCTURE: CPT

## 2023-08-08 RX ORDER — EZETIMIBE 10 MG/1
10 TABLET ORAL DAILY
Status: DISCONTINUED | OUTPATIENT
Start: 2023-08-09 | End: 2023-08-09 | Stop reason: HOSPADM

## 2023-08-08 RX ORDER — ONDANSETRON 2 MG/ML
4 INJECTION INTRAMUSCULAR; INTRAVENOUS EVERY 6 HOURS PRN
Status: DISCONTINUED | OUTPATIENT
Start: 2023-08-08 | End: 2023-08-09 | Stop reason: HOSPADM

## 2023-08-08 RX ORDER — SODIUM CHLORIDE 9 MG/ML
INJECTION, SOLUTION INTRAVENOUS CONTINUOUS
Status: DISCONTINUED | OUTPATIENT
Start: 2023-08-09 | End: 2023-08-09 | Stop reason: HOSPADM

## 2023-08-08 RX ORDER — SODIUM CHLORIDE 9 MG/ML
INJECTION, SOLUTION INTRAVENOUS PRN
Status: DISCONTINUED | OUTPATIENT
Start: 2023-08-08 | End: 2023-08-09 | Stop reason: HOSPADM

## 2023-08-08 RX ORDER — LEVOTHYROXINE SODIUM 88 UG/1
88 TABLET ORAL DAILY
Status: DISCONTINUED | OUTPATIENT
Start: 2023-08-09 | End: 2023-08-09 | Stop reason: HOSPADM

## 2023-08-08 RX ORDER — MORPHINE SULFATE 2 MG/ML
2 INJECTION, SOLUTION INTRAMUSCULAR; INTRAVENOUS
Status: DISCONTINUED | OUTPATIENT
Start: 2023-08-08 | End: 2023-08-09 | Stop reason: HOSPADM

## 2023-08-08 RX ORDER — SODIUM CHLORIDE 0.9 % (FLUSH) 0.9 %
5-40 SYRINGE (ML) INJECTION PRN
Status: DISCONTINUED | OUTPATIENT
Start: 2023-08-08 | End: 2023-08-09 | Stop reason: HOSPADM

## 2023-08-08 RX ORDER — MORPHINE SULFATE 4 MG/ML
4 INJECTION, SOLUTION INTRAMUSCULAR; INTRAVENOUS
Status: DISCONTINUED | OUTPATIENT
Start: 2023-08-08 | End: 2023-08-09 | Stop reason: HOSPADM

## 2023-08-08 RX ORDER — TAMSULOSIN HYDROCHLORIDE 0.4 MG/1
0.4 CAPSULE ORAL DAILY
Status: DISCONTINUED | OUTPATIENT
Start: 2023-08-09 | End: 2023-08-09 | Stop reason: HOSPADM

## 2023-08-08 RX ORDER — SODIUM CHLORIDE 0.9 % (FLUSH) 0.9 %
5-40 SYRINGE (ML) INJECTION EVERY 12 HOURS SCHEDULED
Status: DISCONTINUED | OUTPATIENT
Start: 2023-08-08 | End: 2023-08-09 | Stop reason: HOSPADM

## 2023-08-08 RX ORDER — DILTIAZEM HYDROCHLORIDE 180 MG/1
180 CAPSULE, COATED, EXTENDED RELEASE ORAL DAILY
Status: DISCONTINUED | OUTPATIENT
Start: 2023-08-09 | End: 2023-08-09 | Stop reason: HOSPADM

## 2023-08-08 RX ORDER — ONDANSETRON 4 MG/1
4 TABLET, ORALLY DISINTEGRATING ORAL EVERY 8 HOURS PRN
Status: DISCONTINUED | OUTPATIENT
Start: 2023-08-08 | End: 2023-08-09 | Stop reason: HOSPADM

## 2023-08-08 RX ORDER — FINASTERIDE 5 MG/1
5 TABLET, FILM COATED ORAL DAILY
Status: DISCONTINUED | OUTPATIENT
Start: 2023-08-09 | End: 2023-08-09 | Stop reason: HOSPADM

## 2023-08-08 RX ORDER — ACETAMINOPHEN 650 MG/1
650 SUPPOSITORY RECTAL EVERY 6 HOURS PRN
Status: DISCONTINUED | OUTPATIENT
Start: 2023-08-08 | End: 2023-08-09 | Stop reason: HOSPADM

## 2023-08-08 RX ORDER — ENOXAPARIN SODIUM 100 MG/ML
40 INJECTION SUBCUTANEOUS DAILY
Status: DISCONTINUED | OUTPATIENT
Start: 2023-08-09 | End: 2023-08-09 | Stop reason: HOSPADM

## 2023-08-08 RX ORDER — METOPROLOL SUCCINATE 50 MG/1
100 TABLET, EXTENDED RELEASE ORAL NIGHTLY
Status: DISCONTINUED | OUTPATIENT
Start: 2023-08-08 | End: 2023-08-09 | Stop reason: HOSPADM

## 2023-08-08 RX ORDER — POLYETHYLENE GLYCOL 3350 17 G/17G
17 POWDER, FOR SOLUTION ORAL DAILY PRN
Status: DISCONTINUED | OUTPATIENT
Start: 2023-08-08 | End: 2023-08-09 | Stop reason: HOSPADM

## 2023-08-08 RX ORDER — ACETAMINOPHEN 325 MG/1
650 TABLET ORAL EVERY 6 HOURS PRN
Status: DISCONTINUED | OUTPATIENT
Start: 2023-08-08 | End: 2023-08-09 | Stop reason: HOSPADM

## 2023-08-08 RX ADMIN — METOPROLOL SUCCINATE 100 MG: 50 TABLET, EXTENDED RELEASE ORAL at 23:19

## 2023-08-08 RX ADMIN — SODIUM CHLORIDE, PRESERVATIVE FREE 10 ML: 5 INJECTION INTRAVENOUS at 23:46

## 2023-08-08 ASSESSMENT — PAIN - FUNCTIONAL ASSESSMENT: PAIN_FUNCTIONAL_ASSESSMENT: NONE - DENIES PAIN

## 2023-08-08 NOTE — ED NOTES
Bladder scanned with bladder scanner and 423mL residual noted. Attempted to place cheema per provider. Meeting immediate obstruction near the end of the urethra when attempting to place the cheema. Unable to insert more that about 0.5 cm. Provider notified.      Carlos Carter RN  08/08/23 7715

## 2023-08-08 NOTE — ED PROVIDER NOTES
I independently evaluated and obtained a history and physical on [facility].    I personally saw the patient and performed a substantive portion of the visit including all aspects of the medical decision making. All diagnostic, treatment, and disposition assistants were made to myself in conjunction the advanced practice provider. For further details of this patient's emergency department encounter, please see the advanced practice provider's documentation. History: Patient is a 80-year-old male with history of kidney stones who reports he has been unable to urinate and can feel a blockage in his urethra starting at 1230 this morning. Patient denies any fever or trauma. Physician Exam: Pleasant  male in no acute distress. Regular bradycardic rhythm. Intact distal pulses. Abdomen soft with no rebound guarding or peritoneal signs. MDM:    I personally saw the patient and performed a substantive portion of the visit including all aspects of the medical decision making. Laboratory evaluation is unremarkable with INR being within the patient's desired window between 2 and 3 and no signs of acute kidney injury. Patient is only able to dribble a small amount of urine after and has over 400 mL of urine on bladder scan after this indicating urinary retention. Collado catheter is attempted to be placed however there is obstruction in the urethra likely from obstructive stone. Patient feels uncomfortable and does have urinary retention therefore urology is consulted. Patient is transferred to [facility] for admission to the hospital service and neurology consultation. FINAL IMPRESSION      1. Acute urinary obstruction    2. Acute urinary retention    3. Urethral stone    4.  History of bladder stone             Danyelle Cummings MD  08/08/23 9835

## 2023-08-08 NOTE — ED PROVIDER NOTES
[facility]        Pt Name: Jomar Celestin  MRN: [id number]  [address] [facility] 1945  Date of evaluation: 8/8/2023  Provider: [place] PA-C  PCP: SALVADOR Harmon NP  Note Started: 2:19 PM EDT 8/8/23       I have seen and evaluated this patient with my supervising physician Jett Massey MD.      [address]       Chief Complaint   Patient presents with    Urinary Retention     Pt states unable to urinate, states seems to be a blockage, onset around 1230       HISTORY OF PRESENT ILLNESS: 1 or more Elements     History From: Patient  Limitations to history : None    Jomar Celestin is a 68 y.o. male who presents to the emergency department for evaluation of difficulty urinating symptoms started about 1230 this afternoon states he was urinating and had a good stream and then states suddenly his urethra was blocked and couldn't urinate anymore. States he saw urology in the past and had bladder stones removed may have the same feels like something is stuck in his urethra not allowing the urine to come out. Nursing Notes were all reviewed and agreed with or any disagreements were addressed in the HPI. REVIEW OF SYSTEMS :      Review of Systems    Positives and Pertinent negatives as per HPI.      SURGICAL HISTORY     Past Surgical History:   Procedure Laterality Date    ATRIAL ABLATION SURGERY  12-28-12    ablation of IVC-Tricuspid Valve    ATRIAL ABLATION SURGERY  11/2019    CARDIAC SURGERY  2009    AVR pig    CYST REMOVAL  1972    DIAGNOSTIC CARDIAC CATH LAB PROCEDURE      PACEMAKER INSERTION      A-fib/flutter    SKIN BIOPSY      VENTRAL HERNIA REPAIR         CURRENTMEDICATIONS       Previous Medications    ASPIRIN EC 81 MG EC TABLET    Take 1 tablet by mouth daily    DILTIAZEM (CARDIZEM CD) 180 MG EXTENDED RELEASE CAPSULE    TAKE 1 CAPSULE DAILY    EZETIMIBE (ZETIA) 10 MG TABLET    TAKE ONE TABLET BY MOUTH DAILY    FINASTERIDE (PROSCAR) unremarkable. No peripancreatic stranding. Left adrenal gland r is enlarged. Has Hounsfield units of -7 and measures approximately 1.1 cm by 2.3 cm. This does not need to be followed. The right adrenal gland has a Hounsfield units of 7. It measures 1.6 cm x 1.7 cm. This does not need. Multiple gallstones. small hypodense nodule in the lateral segment of the left lobe of the liver. This has a Hounsfield units of 6. This consistent with a cyst or biliary rest.  It measures 7 by 9 mm. GI/Bowel: The stomach is unremarkable. No small bowel dilation. No colonic wall thickening. Moderate amount of stool in the rectum. Colon is diffusely tortuous. Pelvis: No free fluid. Phleboliths in the pelvis. Prostate is not enlarged. Calcifications within the prostate. Appendix is unremarkable. Peritoneum/Retroperitoneum: No enlarged lymphadenopathy. Aorta has atherosclerotic changes but is normal size. Bones/Soft Tissues: Spondylosis. Osteoarthritic changes of the SI joints and hips. No subcutaneous mass. 1. Negative for obstructive uropathy. 2. Cholelithiasis. No results found. PROCEDURES   Unless otherwise noted below, none     Procedures    CRITICAL CARE TIME (.cctime)   0    PAST MEDICAL HISTORY      has a past medical history of Arthritis, Atrial fibrillation ([address]), CAD (coronary artery disease), Cardiac pacemaker, Hyperlipidemia, Hypertension, Mitral valve disease, and Thyroid disease. EMERGENCY DEPARTMENT COURSE and DIFFERENTIAL DIAGNOSIS/MDM:   Vitals:    Vitals:    08/08/23 1409 08/08/23 1600 08/08/23 1712 08/08/23 1856   BP:  119/70 123/67 (!) 140/75   Pulse: 55 (!) 45 53 85   Resp:  16 16 16   Temp:       TempSrc:       SpO2:  98% 99% 97%   Weight:       Height:           Patient was given the following medications:  Medications - No data to display          Is this patient to be included in the SEP-1 Core Measure due to severe sepsis or septic shock?    No   Exclusion criteria - the patient is

## 2023-08-08 NOTE — ED TRIAGE NOTES
Chief Complaint   Patient presents with    Urinary Retention     Pt states unable to urinate, states seems to be a blockage, onset around 1230

## 2023-08-09 VITALS
TEMPERATURE: 97.6 F | HEIGHT: 70 IN | RESPIRATION RATE: 16 BRPM | WEIGHT: 182.76 LBS | HEART RATE: 62 BPM | SYSTOLIC BLOOD PRESSURE: 154 MMHG | BODY MASS INDEX: 26.16 KG/M2 | OXYGEN SATURATION: 96 % | DIASTOLIC BLOOD PRESSURE: 85 MMHG

## 2023-08-09 LAB
ANION GAP SERPL CALCULATED.3IONS-SCNC: 8 MMOL/L (ref 3–16)
BASOPHILS # BLD: 0.1 K/UL (ref 0–0.2)
BASOPHILS NFR BLD: 3.3 %
BUN SERPL-MCNC: 20 MG/DL (ref 7–20)
CALCIUM SERPL-MCNC: 8.9 MG/DL (ref 8.3–10.6)
CHLORIDE SERPL-SCNC: 105 MMOL/L (ref 99–110)
CO2 SERPL-SCNC: 26 MMOL/L (ref 21–32)
CREAT SERPL-MCNC: 0.9 MG/DL (ref 0.8–1.3)
DEPRECATED RDW RBC AUTO: 14.6 % (ref 12.4–15.4)
EOSINOPHIL # BLD: 0.1 K/UL (ref 0–0.6)
EOSINOPHIL NFR BLD: 3.1 %
GFR SERPLBLD CREATININE-BSD FMLA CKD-EPI: >60 ML/MIN/{1.73_M2}
GLUCOSE SERPL-MCNC: 92 MG/DL (ref 70–99)
HCT VFR BLD AUTO: 38.5 % (ref 40.5–52.5)
HGB BLD-MCNC: 13 G/DL (ref 13.5–17.5)
INR PPP: 2.08 (ref 0.84–1.16)
LYMPHOCYTES # BLD: 0.6 K/UL (ref 1–5.1)
LYMPHOCYTES NFR BLD: 13.5 %
MCH RBC QN AUTO: 32.3 PG (ref 26–34)
MCHC RBC AUTO-ENTMCNC: 33.7 G/DL (ref 31–36)
MCV RBC AUTO: 95.9 FL (ref 80–100)
MONOCYTES # BLD: 0.2 K/UL (ref 0–1.3)
MONOCYTES NFR BLD: 5.7 %
NEUTROPHILS # BLD: 3.1 K/UL (ref 1.7–7.7)
NEUTROPHILS NFR BLD: 74.4 %
PLATELET # BLD AUTO: 148 K/UL (ref 135–450)
PMV BLD AUTO: 7.8 FL (ref 5–10.5)
POTASSIUM SERPL-SCNC: 4.1 MMOL/L (ref 3.5–5.1)
PROTHROMBIN TIME: 23.3 SEC (ref 11.5–14.8)
RBC # BLD AUTO: 4.02 M/UL (ref 4.2–5.9)
SODIUM SERPL-SCNC: 139 MMOL/L (ref 136–145)
WBC # BLD AUTO: 4.2 K/UL (ref 4–11)

## 2023-08-09 PROCEDURE — 36415 COLL VENOUS BLD VENIPUNCTURE: CPT

## 2023-08-09 PROCEDURE — 2580000003 HC RX 258: Performed by: INTERNAL MEDICINE

## 2023-08-09 PROCEDURE — 88300 SURGICAL PATH GROSS: CPT

## 2023-08-09 PROCEDURE — 80048 BASIC METABOLIC PNL TOTAL CA: CPT

## 2023-08-09 PROCEDURE — 51798 US URINE CAPACITY MEASURE: CPT

## 2023-08-09 PROCEDURE — 82365 CALCULUS SPECTROSCOPY: CPT

## 2023-08-09 PROCEDURE — 85610 PROTHROMBIN TIME: CPT

## 2023-08-09 PROCEDURE — 85025 COMPLETE CBC W/AUTO DIFF WBC: CPT

## 2023-08-09 PROCEDURE — 6370000000 HC RX 637 (ALT 250 FOR IP): Performed by: INTERNAL MEDICINE

## 2023-08-09 RX ADMIN — SODIUM CHLORIDE: 9 INJECTION, SOLUTION INTRAVENOUS at 00:10

## 2023-08-09 RX ADMIN — TAMSULOSIN HYDROCHLORIDE 0.4 MG: 0.4 CAPSULE ORAL at 09:05

## 2023-08-09 RX ADMIN — FINASTERIDE 5 MG: 5 TABLET, FILM COATED ORAL at 09:05

## 2023-08-09 RX ADMIN — DILTIAZEM HYDROCHLORIDE 180 MG: 180 CAPSULE, COATED, EXTENDED RELEASE ORAL at 09:05

## 2023-08-09 RX ADMIN — LEVOTHYROXINE SODIUM 88 MCG: 88 TABLET ORAL at 09:05

## 2023-08-09 NOTE — ED NOTES
[facility] in department getting report and preparing to transport pt.      Mak Mcdonough RN  08/08/23 2010

## 2023-08-09 NOTE — PROGRESS NOTES
Pt arrived via stretcher from [facility]. [place]. Gets up independently. Alert and oriented x 4. Denies any pain. VSS. Instructed on measuring urine and straining for stones. Call light in reach and oriented to room. Will continue to monitor.

## 2023-08-09 NOTE — PROGRESS NOTES
Pt D/C to home per order. PIV removed. D/C instructions reviewed. Verbalized understanding.  Pt taken off floor via wheelchair in stable condition

## 2023-08-09 NOTE — DISCHARGE SUMMARY
V2.0  Discharge Summary    Name:  Agata Malik /Age/Sex: 1945 (70 y.o. male)   Admit Date: 2023  Discharge Date: 23    MRN & CSN:  [id number] & [id number] Encounter Date and Time 23 11:20 AM EDT    Attending:  Wilton Tobias MD Discharging Provider: Wilton Tobias MD       Hospital Course:     Brief HPI: Agata Malik is a 68 y.o. male with BPH, recurrent bladder calculi, and pAF s/p ablation and AVR who presented with acute urinary retention. Brief Problem Based Course:     Bladder calculi  BPH  Patient presented with sudden urinary retention while voiding and felt that he likely had another bladder stone. Patient has history of bladder calculi and BPH treated with finasteride and flomax daily. CT abdomen pelvis in ED showed no obstructive uropathy or hydronephrosis, but when nursing attempted to place a cheema it met resistance at the distal urethra. UA was negative and patient has not had systemic symptoms (fever, chills, flank pain, etc.). Patient later passed 2 stones and was able to void. Bladder scan today was 206ml PVR, likely still elevated due to BPH, but patient is voiding appropriately. - Follow up with urology outpatient   - Pending stone analysis              - Continue finasteride, flomax    pAF s/p ablation, AVR  CHB s/p PPM  S/p VSD repair  Patient has a porcine valve. - Coumadin was briefly held in case procedure was needed, will resume all prior medications on discharge. The patient expressed appropriate understanding of, and agreement with the discharge recommendations, medications, and plan.      Consults this admission:  IP CONSULT TO UROLOGY    Discharge Diagnosis:   Acute urinary obstruction  Recurrent bladder calculi  BPH    Discharge Instruction:   Follow up appointments: Urology  Primary care physician: SALVADOR Vera NP within 2 weeks  Diet: regular diet   Activity: activity as tolerated  Disposition: Discharged to:   [x]Home,

## 2023-08-09 NOTE — PROGRESS NOTES
Shift assessment completed. Pt A&O, VSS. Denies any needs at this time. Bed locked and in lowest position, side rails up 2/4. Call light within reach.

## 2023-08-09 NOTE — PROGRESS NOTES
Pt up to urinate. Two orange stones noted in strainer. Stones Placed in cup on bathroom sink. Pt states he feels better and can void easier at this time. Will continue to monitor.

## 2023-08-09 NOTE — PROGRESS NOTES
4 Eyes Skin Assessment     NAME:  Darlyn Bazan  YOB: 1945  MEDICAL RECORD NUMBER:  [id number]    The patient is being assessed for  Admission    I agree that at least one RN has performed a thorough Head to Toe Skin Assessment on the patient. ALL assessment sites listed below have been assessed. Areas assessed by both nurses: Ranulfo aHle RN and Frances Friedman    Head, Face, Ears, Shoulders, Back, Chest, Arms, Elbows, Hands, Sacrum. Buttock, Coccyx, Ischium, Legs. Feet and Heels, Under Medical Devices , and Other          Does the Patient have a Wound?  No noted wound(s)       Pasquale Prevention initiated by RN: No  Wound Care Orders initiated by RN: No    Pressure Injury (Stage 3,4, Unstageable, DTI, NWPT, and Complex wounds) if present, place Wound referral order by RN under : No    New Ostomies, if present place, Ostomy referral order under : No     Nurse 1 eSignature: Electronically signed by Saumya Rosen RN on 8/9/23 at 6:41 AM EDT    **SHARE this note so that the co-signing nurse can place an eSignature**    Nurse 2 eSignature: {Esignature:[id number]}

## 2023-08-09 NOTE — CARE COORDINATION
Case Management Assessment  Initial Evaluation    Date/Time of Evaluation: 8/9/2023 10:12 AM  Assessment Completed by: WYATT Lopez    If patient is discharged prior to next notation, then this note serves as note for discharge by case management. Patient Name: Juana Ortega                   YOB: 1945  Diagnosis: Acute urinary obstruction [N13.9]  Calculus in urethra [N21.1]                   Date / Time: 8/8/2023  8:46 PM    Patient Admission Status: Inpatient   Readmission Risk (Low < 19, Mod (19-27), High > 27): Readmission Risk Score: 8.2    Current PCP: SALVADOR Schneider NP  PCP verified by CM? Yes    Chart Reviewed: Yes      History Provided by: Patient  Patient Orientation: Alert and Oriented, Person, Place, Situation, Self    Patient Cognition: Alert    Hospitalization in the last 30 days (Readmission):  No      Advance Directives:      Code Status: Full Code   Patient's Primary Decision Maker is: Named in Scanned ACP Document (only LW)    Primary Decision Maker: Rafaela Zacarias - Spouse - [phone]    Discharge Planning:    Patient lives with: Spouse/Significant Other Type of Home: House  Primary Care Giver: Self  Patient Support Systems include: Spouse/Significant Other, Family Members, Children   Current Financial resources: Medicare  Current community resources: None  Current services prior to admission: None            Type of Home Care services:  None    ADLS  Prior functional level: Independent in ADLs/IADLs  Current functional level: Independent in ADLs/IADLs    Family can provide assistance at DC: Yes  Would you like Case Management to discuss the discharge plan with any other family members/significant others, and if so, who?  No  Plans to Return to Present Housing: Yes  Potential Assistance needed at discharge: N/A  Patient expects to discharge to: [address] [place] [address] for transportation at discharge: Family    Financial    Payor: Sol Morel / Plan: MEDICARE PART A AND B / Product

## 2023-08-09 NOTE — ED NOTES
Report called to [facility] at [phone]. Report given to KATHERINE MATA [facility] [place].      Yoanna Padgett RN  08/08/23 2009

## 2023-08-09 NOTE — H&P
Hospital Medicine History & Physical      Date of Admission: 8/8/2023    Date of Service:  Pt seen/examined on 8/8/2023    [x]Admitted to Inpatient with expected LOS greater than two midnights due to medical therapy. []Placed in Observation status. Chief Admission Complaint: Difficulty urinating    Presenting Admission History:      68 y.o. male who presented to [facility] with above complaints. PMHx significant for PAF s/p ablation, s/p AVR. With porcine valve, transient CHB s/p PPM, s/p VSD repair, hypothyroidism, HTN, HLD, bladder calculi, presented to the ED with difficulty urinating. Patient reports symptoms started earlier today at around 12:30 PM when he was urinating he had a steady stream and then suddenly stopped and felt like his urethra was blocked. He is only able to dribble some urine every now and then. Patient reports he has had bladder stones that were removed in the past.  And he feels like another stone is stuck in his urethra. Denied any hematuria. He reports mild burning sensation in his otherwise denies any abdominal pain. No nausea vomiting or diarrhea.     Assessment/Plan:      Current Principal Problem:  Acute urinary obstruction  Active Hospital Problems    Diagnosis Date Noted    Acquired hypothyroidism [E03.9] 06/02/2022     Priority: Medium    Acute urinary obstruction [N13.9] 08/08/2023    Calculus in urethra [N21.1] 08/08/2023    S/P VSD closure [Z87.74]     S/P AVR (aortic valve replacement) [Z95.2] 01/20/2015    S/P ablation of atrial flutter [Z98.890, Z86.79] 01/20/2015    Cardiac pacemaker in situ [Z95.0] 10/11/2011    Paroxysmal atrial fibrillation ([address]) [I48.0] 10/11/2011    Hypertension [I10] 10/11/2011    Hyperlipidemia [E78.5] 10/11/2011     Acute urinary obstruction  Likely due to ureteral calculi  -CT abdomen pelvis showed no evidence of obstructive uropathy, no hydronephrosis  -Urology consulted  -Will continue to strain the urine possible passed

## 2023-08-21 ENCOUNTER — ANTI-COAG VISIT (OUTPATIENT)
Dept: CARDIOLOGY CLINIC | Age: 78
End: 2023-08-21
Payer: MEDICARE

## 2023-08-21 DIAGNOSIS — Z95.2 S/P AVR (AORTIC VALVE REPLACEMENT): Primary | Chronic | ICD-10-CM

## 2023-08-21 LAB
APPEARANCE STONE: NORMAL
COMPN STONE: NORMAL
INR BLD: 2.8
SPECIMEN WT: 5 MG

## 2023-08-21 PROCEDURE — 93793 ANTICOAG MGMT PT WARFARIN: CPT | Performed by: NURSE PRACTITIONER

## 2023-08-25 ENCOUNTER — TELEPHONE (OUTPATIENT)
Dept: CARDIOLOGY CLINIC | Age: 78
End: 2023-08-25

## 2023-08-25 NOTE — TELEPHONE ENCOUNTER
CARDIAC CLEARANCE REQUEST    What type of procedure are you having:  Prostate resection   Are you taking any blood thinners:  Aspirin and warfrain- wants held for 7 days   Type on anesthesia:  Unknown   When is your procedure scheduled for:  09.05.23  What physician is performing your procedure:  [facility]- Dr. Toshia Stuart  Phone Number:  [phone]  Fax number to send the letter:   [phone]

## 2023-08-28 NOTE — PROGRESS NOTES

## 2023-08-28 NOTE — TELEPHONE ENCOUNTER
Nonda Mortimer, MD  [facility] Staff 1 hour ago (12:20 PM)     RG  Intermediate cardiac risk   May hold anticoagulation and aspirin for 5 days before procedure.

## 2023-08-28 NOTE — TELEPHONE ENCOUNTER
Pts wife called mhi. Rkg message given per hippa. V/u. Can we please fax letter to Dr. Susan Schlatter office.

## 2023-08-31 ENCOUNTER — ANESTHESIA EVENT (OUTPATIENT)
Dept: OPERATING ROOM | Age: 78
End: 2023-08-31
Payer: MEDICARE

## 2023-09-01 PROCEDURE — 93296 REM INTERROG EVL PM/IDS: CPT | Performed by: INTERNAL MEDICINE

## 2023-09-01 PROCEDURE — 93294 REM INTERROG EVL PM/LDLS PM: CPT | Performed by: INTERNAL MEDICINE

## 2023-09-05 ENCOUNTER — HOSPITAL ENCOUNTER (OUTPATIENT)
Age: 78
Setting detail: OUTPATIENT SURGERY
Discharge: HOME | End: 2023-09-05
Attending: UROLOGY | Admitting: UROLOGY
Payer: MEDICARE

## 2023-09-05 ENCOUNTER — ANTI-COAG VISIT (OUTPATIENT)
Dept: CARDIOLOGY CLINIC | Age: 78
End: 2023-09-05
Payer: MEDICARE

## 2023-09-05 ENCOUNTER — ANTI-COAG VISIT (OUTPATIENT)
Dept: CARDIOLOGY CLINIC | Age: 78
End: 2023-09-05

## 2023-09-05 ENCOUNTER — ANESTHESIA (OUTPATIENT)
Dept: OPERATING ROOM | Age: 78
End: 2023-09-05
Payer: MEDICARE

## 2023-09-05 VITALS
OXYGEN SATURATION: 96 % | SYSTOLIC BLOOD PRESSURE: 128 MMHG | RESPIRATION RATE: 29 BRPM | TEMPERATURE: 96.9 F | DIASTOLIC BLOOD PRESSURE: 69 MMHG | HEIGHT: 70 IN | BODY MASS INDEX: 25.2 KG/M2 | WEIGHT: 176 LBS | HEART RATE: 62 BPM

## 2023-09-05 DIAGNOSIS — Z79.01 CHRONIC ANTICOAGULATION: Primary | ICD-10-CM

## 2023-09-05 DIAGNOSIS — G89.18 POSTOPERATIVE PAIN: Primary | ICD-10-CM

## 2023-09-05 LAB
APTT BLD: 28 SEC (ref 22.7–35.9)
EKG ATRIAL RATE: 63 BPM
EKG DIAGNOSIS: NORMAL
EKG P-R INTERVAL: 204 MS
EKG Q-T INTERVAL: 448 MS
EKG QRS DURATION: 134 MS
EKG QTC CALCULATION (BAZETT): 458 MS
EKG R AXIS: -14 DEGREES
EKG T AXIS: 5 DEGREES
EKG VENTRICULAR RATE: 63 BPM
INR BLD: 1.6
INR PPP: 1.45 (ref 0.84–1.16)
PROTHROMBIN TIME: 17.6 SEC (ref 11.5–14.8)

## 2023-09-05 PROCEDURE — 2580000003 HC RX 258: Performed by: NURSE ANESTHETIST, CERTIFIED REGISTERED

## 2023-09-05 PROCEDURE — 6360000002 HC RX W HCPCS: Performed by: NURSE ANESTHETIST, CERTIFIED REGISTERED

## 2023-09-05 PROCEDURE — 7100000010 HC PHASE II RECOVERY - FIRST 15 MIN: Performed by: UROLOGY

## 2023-09-05 PROCEDURE — 7100000000 HC PACU RECOVERY - FIRST 15 MIN: Performed by: UROLOGY

## 2023-09-05 PROCEDURE — 3600000014 HC SURGERY LEVEL 4 ADDTL 15MIN: Performed by: UROLOGY

## 2023-09-05 PROCEDURE — 3700000000 HC ANESTHESIA ATTENDED CARE: Performed by: UROLOGY

## 2023-09-05 PROCEDURE — 85610 PROTHROMBIN TIME: CPT

## 2023-09-05 PROCEDURE — 3600000004 HC SURGERY LEVEL 4 BASE: Performed by: UROLOGY

## 2023-09-05 PROCEDURE — 85730 THROMBOPLASTIN TIME PARTIAL: CPT

## 2023-09-05 PROCEDURE — 36415 COLL VENOUS BLD VENIPUNCTURE: CPT

## 2023-09-05 PROCEDURE — 7100000011 HC PHASE II RECOVERY - ADDTL 15 MIN: Performed by: UROLOGY

## 2023-09-05 PROCEDURE — 93793 ANTICOAG MGMT PT WARFARIN: CPT | Performed by: NURSE PRACTITIONER

## 2023-09-05 PROCEDURE — 6360000002 HC RX W HCPCS: Performed by: UROLOGY

## 2023-09-05 PROCEDURE — 93010 ELECTROCARDIOGRAM REPORT: CPT | Performed by: INTERNAL MEDICINE

## 2023-09-05 PROCEDURE — 2580000003 HC RX 258: Performed by: UROLOGY

## 2023-09-05 PROCEDURE — 2500000003 HC RX 250 WO HCPCS: Performed by: NURSE ANESTHETIST, CERTIFIED REGISTERED

## 2023-09-05 PROCEDURE — 93005 ELECTROCARDIOGRAM TRACING: CPT | Performed by: ANESTHESIOLOGY

## 2023-09-05 PROCEDURE — 2709999900 HC NON-CHARGEABLE SUPPLY: Performed by: UROLOGY

## 2023-09-05 PROCEDURE — 3700000001 HC ADD 15 MINUTES (ANESTHESIA): Performed by: UROLOGY

## 2023-09-05 PROCEDURE — 7100000001 HC PACU RECOVERY - ADDTL 15 MIN: Performed by: UROLOGY

## 2023-09-05 RX ORDER — PROPOFOL 10 MG/ML
INJECTION, EMULSION INTRAVENOUS PRN
Status: DISCONTINUED | OUTPATIENT
Start: 2023-09-05 | End: 2023-09-05 | Stop reason: SDUPTHER

## 2023-09-05 RX ORDER — MIDAZOLAM HYDROCHLORIDE 1 MG/ML
1 INJECTION INTRAMUSCULAR; INTRAVENOUS EVERY 5 MIN PRN
Status: DISCONTINUED | OUTPATIENT
Start: 2023-09-05 | End: 2023-09-05 | Stop reason: HOSPADM

## 2023-09-05 RX ORDER — FENTANYL CITRATE 50 UG/ML
INJECTION, SOLUTION INTRAMUSCULAR; INTRAVENOUS PRN
Status: DISCONTINUED | OUTPATIENT
Start: 2023-09-05 | End: 2023-09-05 | Stop reason: SDUPTHER

## 2023-09-05 RX ORDER — SODIUM CHLORIDE 9 MG/ML
INJECTION, SOLUTION INTRAVENOUS PRN
Status: DISCONTINUED | OUTPATIENT
Start: 2023-09-05 | End: 2023-09-05 | Stop reason: HOSPADM

## 2023-09-05 RX ORDER — OXYCODONE HYDROCHLORIDE 5 MG/1
5 TABLET ORAL
Status: DISCONTINUED | OUTPATIENT
Start: 2023-09-05 | End: 2023-09-05 | Stop reason: HOSPADM

## 2023-09-05 RX ORDER — LIDOCAINE HYDROCHLORIDE 20 MG/ML
INJECTION, SOLUTION INFILTRATION; PERINEURAL PRN
Status: DISCONTINUED | OUTPATIENT
Start: 2023-09-05 | End: 2023-09-05 | Stop reason: SDUPTHER

## 2023-09-05 RX ORDER — NITROFURANTOIN 25; 75 MG/1; MG/1
100 CAPSULE ORAL 2 TIMES DAILY
Qty: 10 CAPSULE | Refills: 0 | Status: SHIPPED | OUTPATIENT
Start: 2023-09-05 | End: 2023-09-10

## 2023-09-05 RX ORDER — ONDANSETRON 2 MG/ML
4 INJECTION INTRAMUSCULAR; INTRAVENOUS EVERY 10 MIN PRN
Status: DISCONTINUED | OUTPATIENT
Start: 2023-09-05 | End: 2023-09-05 | Stop reason: HOSPADM

## 2023-09-05 RX ORDER — DIPHENHYDRAMINE HYDROCHLORIDE 50 MG/ML
12.5 INJECTION INTRAMUSCULAR; INTRAVENOUS
Status: DISCONTINUED | OUTPATIENT
Start: 2023-09-05 | End: 2023-09-05 | Stop reason: HOSPADM

## 2023-09-05 RX ORDER — SODIUM CHLORIDE, SODIUM LACTATE, POTASSIUM CHLORIDE, CALCIUM CHLORIDE 600; 310; 30; 20 MG/100ML; MG/100ML; MG/100ML; MG/100ML
INJECTION, SOLUTION INTRAVENOUS CONTINUOUS
Status: DISCONTINUED | OUTPATIENT
Start: 2023-09-05 | End: 2023-09-05 | Stop reason: HOSPADM

## 2023-09-05 RX ORDER — SODIUM CHLORIDE 0.9 % (FLUSH) 0.9 %
5-40 SYRINGE (ML) INJECTION EVERY 12 HOURS SCHEDULED
Status: DISCONTINUED | OUTPATIENT
Start: 2023-09-05 | End: 2023-09-05 | Stop reason: HOSPADM

## 2023-09-05 RX ORDER — LIDOCAINE HYDROCHLORIDE 10 MG/ML
0.3 INJECTION, SOLUTION EPIDURAL; INFILTRATION; INTRACAUDAL; PERINEURAL
Status: DISCONTINUED | OUTPATIENT
Start: 2023-09-05 | End: 2023-09-05 | Stop reason: HOSPADM

## 2023-09-05 RX ORDER — SODIUM CHLORIDE, SODIUM LACTATE, POTASSIUM CHLORIDE, CALCIUM CHLORIDE 600; 310; 30; 20 MG/100ML; MG/100ML; MG/100ML; MG/100ML
INJECTION, SOLUTION INTRAVENOUS CONTINUOUS PRN
Status: DISCONTINUED | OUTPATIENT
Start: 2023-09-05 | End: 2023-09-05 | Stop reason: SDUPTHER

## 2023-09-05 RX ORDER — MEPERIDINE HYDROCHLORIDE 25 MG/ML
12.5 INJECTION INTRAMUSCULAR; INTRAVENOUS; SUBCUTANEOUS EVERY 5 MIN PRN
Status: DISCONTINUED | OUTPATIENT
Start: 2023-09-05 | End: 2023-09-05 | Stop reason: HOSPADM

## 2023-09-05 RX ORDER — SODIUM CHLORIDE 0.9 % (FLUSH) 0.9 %
5-40 SYRINGE (ML) INJECTION PRN
Status: DISCONTINUED | OUTPATIENT
Start: 2023-09-05 | End: 2023-09-05 | Stop reason: HOSPADM

## 2023-09-05 RX ORDER — SENNA AND DOCUSATE SODIUM 50; 8.6 MG/1; MG/1
2 TABLET, FILM COATED ORAL DAILY
Qty: 28 TABLET | Refills: 0 | Status: SHIPPED | OUTPATIENT
Start: 2023-09-05 | End: 2023-09-19

## 2023-09-05 RX ORDER — PHENAZOPYRIDINE HYDROCHLORIDE 100 MG/1
100 TABLET, FILM COATED ORAL 3 TIMES DAILY PRN
Qty: 15 TABLET | Refills: 0 | Status: SHIPPED | OUTPATIENT
Start: 2023-09-05 | End: 2023-09-10

## 2023-09-05 RX ORDER — PHENYLEPHRINE HYDROCHLORIDE 10 MG/ML
INJECTION INTRAVENOUS PRN
Status: DISCONTINUED | OUTPATIENT
Start: 2023-09-05 | End: 2023-09-05 | Stop reason: SDUPTHER

## 2023-09-05 RX ORDER — HYDRALAZINE HYDROCHLORIDE 20 MG/ML
5 INJECTION INTRAMUSCULAR; INTRAVENOUS
Status: DISCONTINUED | OUTPATIENT
Start: 2023-09-05 | End: 2023-09-05 | Stop reason: HOSPADM

## 2023-09-05 RX ORDER — OXYCODONE HYDROCHLORIDE AND ACETAMINOPHEN 5; 325 MG/1; MG/1
1 TABLET ORAL EVERY 8 HOURS PRN
Qty: 9 TABLET | Refills: 0 | Status: SHIPPED | OUTPATIENT
Start: 2023-09-05 | End: 2023-09-08

## 2023-09-05 RX ORDER — ONDANSETRON 2 MG/ML
INJECTION INTRAMUSCULAR; INTRAVENOUS PRN
Status: DISCONTINUED | OUTPATIENT
Start: 2023-09-05 | End: 2023-09-05 | Stop reason: SDUPTHER

## 2023-09-05 RX ADMIN — PROPOFOL 170 MG: 10 INJECTION, EMULSION INTRAVENOUS at 12:34

## 2023-09-05 RX ADMIN — LIDOCAINE HYDROCHLORIDE 80 MG: 20 INJECTION, SOLUTION INFILTRATION; PERINEURAL at 12:34

## 2023-09-05 RX ADMIN — PHENYLEPHRINE HYDROCHLORIDE 200 MCG: 10 INJECTION INTRAVENOUS at 12:43

## 2023-09-05 RX ADMIN — CEFAZOLIN 2000 MG: 2 INJECTION, POWDER, FOR SOLUTION INTRAMUSCULAR; INTRAVENOUS at 12:30

## 2023-09-05 RX ADMIN — ONDANSETRON HYDROCHLORIDE 4 MG: 2 INJECTION, SOLUTION INTRAMUSCULAR; INTRAVENOUS at 12:34

## 2023-09-05 RX ADMIN — FENTANYL CITRATE 50 MCG: 50 INJECTION INTRAMUSCULAR; INTRAVENOUS at 12:34

## 2023-09-05 RX ADMIN — FENTANYL CITRATE 50 MCG: 50 INJECTION INTRAMUSCULAR; INTRAVENOUS at 13:25

## 2023-09-05 RX ADMIN — SODIUM CHLORIDE, POTASSIUM CHLORIDE, SODIUM LACTATE AND CALCIUM CHLORIDE: 600; 310; 30; 20 INJECTION, SOLUTION INTRAVENOUS at 12:31

## 2023-09-05 ASSESSMENT — PAIN DESCRIPTION - PAIN TYPE: TYPE: OTHER (COMMENT)

## 2023-09-05 ASSESSMENT — PAIN DESCRIPTION - DESCRIPTORS: DESCRIPTORS: OTHER (COMMENT)

## 2023-09-05 ASSESSMENT — PAIN DESCRIPTION - ONSET: ONSET: OTHER (COMMENT)

## 2023-09-05 ASSESSMENT — PAIN SCALES - GENERAL
PAINLEVEL_OUTOF10: 3
PAINLEVEL_OUTOF10: 2
PAINLEVEL_OUTOF10: 3

## 2023-09-05 ASSESSMENT — PAIN DESCRIPTION - ORIENTATION
ORIENTATION: INNER

## 2023-09-05 ASSESSMENT — PAIN - FUNCTIONAL ASSESSMENT
PAIN_FUNCTIONAL_ASSESSMENT: ACTIVITIES ARE NOT PREVENTED
PAIN_FUNCTIONAL_ASSESSMENT: 0-10

## 2023-09-05 ASSESSMENT — PAIN DESCRIPTION - LOCATION: LOCATION: PENIS

## 2023-09-05 ASSESSMENT — PAIN DESCRIPTION - FREQUENCY: FREQUENCY: OTHER (COMMENT)

## 2023-09-05 NOTE — OP NOTE
dilator as the meatus was very tight. We serially dilated from 16 [place] up to 29 [address] that way we can advance the 26 [place] sheath. Cystoscope was advanced first, the anterior urethra was within normal limits. Other findings are above. Saw numerous stones in the floor the bladder total size 2 centimeters, we used pulverized forceps and then the EllNujira evacuator to flush out the stones. I exchanged the 26 Mongolian sheath and deployed it with the manual obturator followed by plasma vaporization button with the working bridge. I began to open the prostate bladder neck at the 5 and 7 o'clock position resected down the left lobe was followed by the right lobe. My resection went from his bladder neck back to the area of his verumontanum. Bleeding was minimized by performing cauterization throughout. He had an open channel at the end of the case. Then we remove some apical prostate tissue and then used pinpoint cautery bladder neck areas and in the fossa achieve excellent hemostasis and there was no active bleeding. His external sphincter and ureteral orifices were intact without injury. There was no evidence of any undermining or laser injury at the bladder neck. I placed a 22 Mongolian 3-way Collado catheter with 40 mL of sterile water into the balloon. His catheter was connected to continuous bladder irrigation with saline. He was then awakened from anesthesia, extubated, and brought to the PACU. There were no apparent complications. Follow up:  -Collado removal either in recovery or 1-2 days in the [facility] office  -He will resume Coumadin tonight as long as the urine is clear  -Postop check in 4-6 weeks.

## 2023-09-05 NOTE — ANESTHESIA POSTPROCEDURE EVALUATION
Department of Anesthesiology  Postprocedure Note    Patient: Heather Rothman  MRN: [id number]  YOB: 1945  Date of evaluation: 9/5/2023      Procedure Summary     Date: 09/05/23 Room / Location: [address] [place] [address] / [facility]    Anesthesia Start: 0119 Anesthesia Stop: 1330    Procedure: CYSTOSCOPY TRANSURETHRAL RESECTION PROSTATE (Bladder) Diagnosis:       Benign prostatic hyperplasia with lower urinary tract symptoms, symptom details unspecified      (Benign prostatic hyperplasia with lower urinary tract symptoms, symptom details unspecified [N40.1])    Surgeons: Dariel Batres MD Responsible Provider: Regla Langford MD    Anesthesia Type: general ASA Status: 3          Anesthesia Type: No value filed.     Nyasia Phase I: Nyasia Score: 10    Nyasia Phase II: Nyasia Score: 10      Anesthesia Post Evaluation    Patient location during evaluation: PACU  Level of consciousness: awake  Airway patency: patent  Nausea & Vomiting: no nausea  Complications: no  Cardiovascular status: blood pressure returned to baseline  Respiratory status: acceptable  Hydration status: euvolemic  Pain management: adequate

## 2023-09-05 NOTE — DISCHARGE INSTRUCTIONS
-Resume coumadin tomorrow    -Cheema removal in [place] on Thursday morning 7 to 9am or the patient can be taught and he can remove the cheema him self at home.     -Follow up in 4-6 weeks in the [facility] urology office for pathology discussion and postop visit. -Expect light pink, light red urine. If passing large jelly like clots or catheter not draining, please call or go to THI Ugarte MD  Office: [phone]             Transurethral Resection of the Prostate (TURP): What to Expect at   [facility] [address] [facility]    Transurethral resection of the prostate (TURP) is surgery to remove prostate tissue. It is done when an overgrown prostate gland is pressing on the urethra and making it hard for a man to urinate. You may need a urinary catheter for a short time. It is a flexible plastic tube used to drain urine from your bladder when you can't urinate on your own. If it is still in place when you go home, your doctor will give you instructions on how to care for your catheter. For several days after surgery, you may feel burning when you urinate. Your urine may be pink for 1 to 3 weeks after surgery. You also may have bladder cramps, or spasms. Your doctor may give you medicine to help control the spasms. You may still feel like you need to urinate often in the weeks after your surgery. It often takes up to 6 weeks for this to get better. After you have healed, you may have less trouble urinating. You may have better control over starting and stopping your urine stream. And you may feel like you get more relief when you urinate. Most men can return to work or many of their usual tasks in 1 to 3 weeks. But for about 4 weeks, try to avoid heavy lifting and strenuous activities that might put extra pressure on your bladder. Most men still can have erections after surgery (if they were able to have them before surgery). But they may not ejaculate when they have an orgasm.  Semen may go into

## 2023-09-18 LAB — INR BLD: 2.6

## 2023-09-19 ENCOUNTER — TELEPHONE (OUTPATIENT)
Dept: CARDIOLOGY CLINIC | Age: 78
End: 2023-09-19

## 2023-09-19 ENCOUNTER — ANTI-COAG VISIT (OUTPATIENT)
Dept: CARDIOLOGY CLINIC | Age: 78
End: 2023-09-19
Payer: MEDICARE

## 2023-09-19 DIAGNOSIS — Z95.2 S/P AVR (AORTIC VALVE REPLACEMENT): Primary | Chronic | ICD-10-CM

## 2023-09-19 DIAGNOSIS — I48.0 PAROXYSMAL ATRIAL FIBRILLATION (HCC): Chronic | ICD-10-CM

## 2023-09-19 PROCEDURE — 93793 ANTICOAG MGMT PT WARFARIN: CPT | Performed by: NURSE PRACTITIONER

## 2023-09-19 NOTE — TELEPHONE ENCOUNTER
09/18/2023, INR 2.6  Per protocol, no changes   Checks bi weekly.   Left a detailed message to continue current dose

## 2023-10-02 ENCOUNTER — ANTI-COAG VISIT (OUTPATIENT)
Dept: CARDIOLOGY CLINIC | Age: 78
End: 2023-10-02
Payer: MEDICARE

## 2023-10-02 DIAGNOSIS — I48.0 PAROXYSMAL ATRIAL FIBRILLATION (HCC): Primary | Chronic | ICD-10-CM

## 2023-10-02 LAB — INR BLD: 3

## 2023-10-02 PROCEDURE — 93793 ANTICOAG MGMT PT WARFARIN: CPT | Performed by: NURSE PRACTITIONER

## 2023-10-05 NOTE — PROGRESS NOTES
[facility]   Cardiac Consultation    Date: 10/11/23  Patient Name: Kit Rey  YOB: 1945    Primary Care Physician: SALVADOR Meyer NP    CHIEF COMPLAINT:   Chief Complaint   Patient presents with    6 Month Follow-Up    Device Check    Atrial Fibrillation    Other     Atrial flutter, AV block complete        HPI:  Kit Rey is a 66 y.o. male with a history of permanent pacemaker for temporary AV block, post AVR (2006, porcine per patient report), and closure of VSD in 12/2006. He had a radiofrequency catheter ablation for atrial flutter on 12/28/12. He has a history of PAF. He underwent pulmonary vein isolation in 2019 and has done well since then. He is on coumadin for anticoagulation. He was following with EP at [facility], but is switching back to [facility] due to location. His echo on 11/26/2021 demonstrated an EF of 55%. 24 hour cardiac event monitor 5/4/2022 demonstrated predominately NSR with an average HR of 78 (60-92) BPM, 30% PVC burden (mostly uniform). On 06/29/2022 patient reported he has been feeling well. He reported he works out at [employer] 4 days per week and also works in the yard and tolerates those activities well. In 7/2022 limited echo showed EF of 55-60%, no WMA, mild MR, mild TR and mild pulmonary HTN. On 4/11/2023, he reports that he is feeling well overall. Device interrogation demonstrated AP 44%,  <1%, PVC burden 30%, PARVIN 3 years. He reports that he exercises in the gym 4 days a week. He uses the row machine for 20 minutes and walks 1/2 mile on the treadmill. Today, 10/11/2023, Device interrogation demonstrated AP 46%,  3.7%, AT/AF burden <1% (longest episode 2 hours 51 mins 7/8/2023), PARVIN 1.6 years. He is still active with going to the gym on a regular basis and tolerates this activity well. He reports minor swelling in his lower extremities. He reamins asymptomatic with AF episodes.  He reports feeling overall

## 2023-10-11 ENCOUNTER — NURSE ONLY (OUTPATIENT)
Dept: CARDIOLOGY CLINIC | Age: 78
End: 2023-10-11

## 2023-10-11 ENCOUNTER — OFFICE VISIT (OUTPATIENT)
Dept: CARDIOLOGY CLINIC | Age: 78
End: 2023-10-11

## 2023-10-11 VITALS
OXYGEN SATURATION: 99 % | HEART RATE: 75 BPM | BODY MASS INDEX: 26.37 KG/M2 | DIASTOLIC BLOOD PRESSURE: 76 MMHG | HEIGHT: 70 IN | WEIGHT: 184.2 LBS | SYSTOLIC BLOOD PRESSURE: 122 MMHG

## 2023-10-11 DIAGNOSIS — I44.2 ATRIOVENTRICULAR BLOCK, COMPLETE (HCC): Chronic | ICD-10-CM

## 2023-10-11 DIAGNOSIS — Z95.0 CARDIAC PACEMAKER IN SITU: Chronic | ICD-10-CM

## 2023-10-11 DIAGNOSIS — I48.3 TYPICAL ATRIAL FLUTTER (HCC): ICD-10-CM

## 2023-10-11 DIAGNOSIS — Z95.0 CARDIAC PACEMAKER IN SITU: Primary | Chronic | ICD-10-CM

## 2023-10-11 DIAGNOSIS — I44.2 ATRIOVENTRICULAR BLOCK, COMPLETE (HCC): Primary | Chronic | ICD-10-CM

## 2023-10-11 DIAGNOSIS — I48.0 PAROXYSMAL ATRIAL FIBRILLATION (HCC): Chronic | ICD-10-CM

## 2023-10-11 NOTE — PATIENT INSTRUCTIONS
Plan:  Remote device checks every three months. Continue taking current cardiac medications as prescribed. Follow up in 6 months with EP NP.

## 2023-10-16 ENCOUNTER — ANTI-COAG VISIT (OUTPATIENT)
Dept: CARDIOLOGY CLINIC | Age: 78
End: 2023-10-16
Payer: MEDICARE

## 2023-10-16 ENCOUNTER — TELEPHONE (OUTPATIENT)
Dept: CARDIOLOGY CLINIC | Age: 78
End: 2023-10-16

## 2023-10-16 DIAGNOSIS — Z95.2 S/P AVR (AORTIC VALVE REPLACEMENT): Primary | Chronic | ICD-10-CM

## 2023-10-16 LAB — INR BLD: 4

## 2023-10-16 PROCEDURE — 93793 ANTICOAG MGMT PT WARFARIN: CPT | Performed by: NURSE PRACTITIONER

## 2023-10-23 ENCOUNTER — ANTI-COAG VISIT (OUTPATIENT)
Dept: CARDIOLOGY CLINIC | Age: 78
End: 2023-10-23
Payer: MEDICARE

## 2023-10-23 DIAGNOSIS — Z95.2 S/P AVR (AORTIC VALVE REPLACEMENT): Primary | Chronic | ICD-10-CM

## 2023-10-23 LAB — INR BLD: 2.9

## 2023-10-23 PROCEDURE — 93793 ANTICOAG MGMT PT WARFARIN: CPT | Performed by: NURSE PRACTITIONER

## 2023-11-06 ENCOUNTER — ANTI-COAG VISIT (OUTPATIENT)
Dept: CARDIOLOGY CLINIC | Age: 78
End: 2023-11-06
Payer: MEDICARE

## 2023-11-06 DIAGNOSIS — I48.0 PAROXYSMAL ATRIAL FIBRILLATION (HCC): Primary | Chronic | ICD-10-CM

## 2023-11-06 LAB — INR BLD: 2.7

## 2023-11-06 PROCEDURE — 93793 ANTICOAG MGMT PT WARFARIN: CPT | Performed by: NURSE PRACTITIONER

## 2023-11-06 NOTE — PROGRESS NOTES
Pulmonary & Critical Care Medicine    Admit Date: 2022  PCP: Tasia Frausto MD    CC:  respiratory failure   Events of Last 24 hours:   No major events over the past 24 hours. Overall stable general condition     Vitals:  Tmax:  VITALS:  BP (!) 108/52   Pulse 82   Temp 97.9 °F (36.6 °C) (Axillary)   Resp 18   Ht 6' (1.829 m)   Wt 194 lb 7.1 oz (88.2 kg)   SpO2 97%   BMI 26.37 kg/m²   24HR INTAKE/OUTPUT:    Intake/Output Summary (Last 24 hours) at 10/25/2022 1620  Last data filed at 10/25/2022 1600  Gross per 24 hour   Intake 1962 ml   Output 1200 ml   Net 762 ml     CURRENT PULSE OXIMETRY:  SpO2: 97 %  24HR PULSE OXIMETRY RANGE:  SpO2  Av %  Min: 94 %  Max: 99 %    EXAM:  General: No distress. Alert. Eyes: PERRL. No sclera icterus. No conjunctival injection. ENT: trach tube in place. Neck: Trachea midline. Neck is supple   Resp: No accessory muscle use. No crackles. No wheezing. No rhonchi. CV: Regular rate. Regular rhythm. No mumur or rub. Bilateral edema    GI: Non-tender. Non-distended. Normal bowel sounds. Neuro: Awake. Speech is clear. Psych:  No anxiety or agitation.      Medications:    IV:   dextrose      sodium chloride      sodium chloride 25 mL (10/19/22 0029)         Scheduled Meds:   insulin glargine  30 Units SubCUTAneous Nightly    guar gum  1 packet Oral TID    albuterol  2.5 mg Nebulization 4x daily    bisacodyl  10 mg Rectal Daily    collagenase   Topical Daily    methocarbamol  500 mg PEG Tube 4x Daily    doxazosin  1 mg Oral Daily    clotrimazole   Topical BID    lansoprazole  30 mg Per G Tube BID    insulin lispro  0-16 Units SubCUTAneous Q4H    Venelex   Topical BID    chlorhexidine  15 mL Mouth/Throat BID    [Held by provider] apixaban  5 mg Oral BID    sodium chloride flush  5-40 mL IntraVENous 2 times per day         Diet: Diet NPO  ADULT TUBE FEEDING; PEG; Standard with Fiber; Continuous; 20; Yes; 10; Q 4 hours; 55; 200; Q 4 hours; Protein; 1 bottles Reviewed Proteinex daily w/ 30 mL FW flushes before and after     Results:  CBC:   Recent Labs     10/23/22  0510 10/24/22  0517 10/25/22  0438   WBC 11.6* 16.4* 13.5*   HGB 7.0* 7.9* 7.2*   HCT 22.8* 24.8* 23.0*   MCV 88.9 87.1 87.0    498* 454*     BMP:   Recent Labs     10/23/22  0510 10/24/22  0517 10/25/22  0438    139 141   K 3.8 4.1 4.0    100 99   CO2 30 31 33*   PHOS 3.4 2.9 2.9   BUN 72* 71* 69*   CREATININE 0.8 0.8 0.7*     LIVER PROFILE: No results for input(s): AST, ALT, LIPASE, BILIDIR, BILITOT, ALKPHOS in the last 72 hours. Invalid input(s): AMYLASE,  ALB  PT/INR: No results for input(s): PROTIME, INR in the last 72 hours. APTT: No results for input(s): APTT in the last 72 hours. UA:No results for input(s): NITRITE, COLORU, PHUR, LABCAST, WBCUA, RBCUA, MUCUS, TRICHOMONAS, YEAST, BACTERIA, CLARITYU, SPECGRAV, LEUKOCYTESUR, UROBILINOGEN, BILIRUBINUR, BLOODU, GLUCOSEU, AMORPHOUS in the last 72 hours. Invalid input(s): Karthik Neil      Assessment/Plan:  66 y.o. male with     Acute on chronic hypercapnic and hypoxic respiratory failure s/p trach  Pleural effusion s/p pleurx cath placement. A/C PRVC , RR 14, FiO2 30, PEEP 5. Pleurx cath is drained every other day. Leukocytosis is trending down. He tolerated trial with PS 25  Will try him today and PS 20 for one hour if he can tolerate it. I tried him on PS of 20 while I was in the room, RR was up to 31 and VT ~ 350  Continue SBT daily   Discussed with LINDA on 10/24.   Awaiting approval for new insurance which will kick in on 11/1    Judie Rhodes MD

## 2023-11-20 ENCOUNTER — ANTI-COAG VISIT (OUTPATIENT)
Dept: CARDIOLOGY CLINIC | Age: 78
End: 2023-11-20
Payer: MEDICARE

## 2023-11-20 DIAGNOSIS — Z95.2 S/P AVR (AORTIC VALVE REPLACEMENT): Primary | Chronic | ICD-10-CM

## 2023-11-20 LAB — INR BLD: 2.4

## 2023-11-20 PROCEDURE — 93793 ANTICOAG MGMT PT WARFARIN: CPT | Performed by: NURSE PRACTITIONER

## 2023-12-01 PROCEDURE — 93294 REM INTERROG EVL PM/LDLS PM: CPT | Performed by: INTERNAL MEDICINE

## 2023-12-01 PROCEDURE — 93296 REM INTERROG EVL PM/IDS: CPT | Performed by: INTERNAL MEDICINE

## 2023-12-04 ENCOUNTER — ANTI-COAG VISIT (OUTPATIENT)
Dept: CARDIOLOGY CLINIC | Age: 78
End: 2023-12-04
Payer: COMMERCIAL

## 2023-12-04 DIAGNOSIS — I48.0 PAROXYSMAL ATRIAL FIBRILLATION (HCC): Primary | Chronic | ICD-10-CM

## 2023-12-04 LAB — INR BLD: 3

## 2023-12-04 PROCEDURE — 93793 ANTICOAG MGMT PT WARFARIN: CPT | Performed by: NURSE PRACTITIONER

## 2024-01-01 LAB — INR BLD: 2.6

## 2024-01-01 PROCEDURE — 93793 ANTICOAG MGMT PT WARFARIN: CPT | Performed by: NURSE PRACTITIONER

## 2024-01-02 ENCOUNTER — ANTI-COAG VISIT (OUTPATIENT)
Dept: CARDIOLOGY CLINIC | Age: 79
End: 2024-01-02
Payer: MEDICARE

## 2024-01-02 DIAGNOSIS — Z95.2 S/P AVR (AORTIC VALVE REPLACEMENT): Primary | Chronic | ICD-10-CM

## 2024-01-15 ENCOUNTER — ANTI-COAG VISIT (OUTPATIENT)
Dept: CARDIOLOGY CLINIC | Age: 79
End: 2024-01-15
Payer: MEDICARE

## 2024-01-15 DIAGNOSIS — I48.0 PAROXYSMAL ATRIAL FIBRILLATION (HCC): Primary | Chronic | ICD-10-CM

## 2024-01-15 LAB — INR BLD: 3

## 2024-01-15 PROCEDURE — 93793 ANTICOAG MGMT PT WARFARIN: CPT | Performed by: NURSE PRACTITIONER

## 2024-01-29 ENCOUNTER — ANTI-COAG VISIT (OUTPATIENT)
Dept: CARDIOLOGY CLINIC | Age: 79
End: 2024-01-29
Payer: MEDICARE

## 2024-01-29 DIAGNOSIS — I48.0 PAROXYSMAL ATRIAL FIBRILLATION (HCC): Primary | Chronic | ICD-10-CM

## 2024-01-29 LAB — INR BLD: 2.1

## 2024-01-29 PROCEDURE — 93793 ANTICOAG MGMT PT WARFARIN: CPT | Performed by: NURSE PRACTITIONER

## 2024-02-12 ENCOUNTER — ANTI-COAG VISIT (OUTPATIENT)
Dept: CARDIOLOGY CLINIC | Age: 79
End: 2024-02-12
Payer: MEDICARE

## 2024-02-12 DIAGNOSIS — I48.0 PAROXYSMAL ATRIAL FIBRILLATION (HCC): Primary | Chronic | ICD-10-CM

## 2024-02-12 LAB — INR BLD: 3.5

## 2024-02-12 PROCEDURE — 93793 ANTICOAG MGMT PT WARFARIN: CPT | Performed by: NURSE PRACTITIONER

## 2024-02-19 ENCOUNTER — ANTI-COAG VISIT (OUTPATIENT)
Dept: CARDIOLOGY CLINIC | Age: 79
End: 2024-02-19
Payer: MEDICARE

## 2024-02-19 DIAGNOSIS — Z95.2 S/P AVR (AORTIC VALVE REPLACEMENT): Primary | Chronic | ICD-10-CM

## 2024-02-19 LAB — INR BLD: 2.7

## 2024-02-19 PROCEDURE — 93793 ANTICOAG MGMT PT WARFARIN: CPT | Performed by: NURSE PRACTITIONER

## 2024-02-26 RX ORDER — DILTIAZEM HYDROCHLORIDE 180 MG/1
CAPSULE, COATED, EXTENDED RELEASE ORAL
Qty: 90 CAPSULE | Refills: 1 | Status: SHIPPED | OUTPATIENT
Start: 2024-02-26

## 2024-02-26 NOTE — PROGRESS NOTES
NOV 6/27/24  LOV 3/2023    Pt requesting refill of diltiazem be sent to [facility]

## 2024-03-04 ENCOUNTER — ANTI-COAG VISIT (OUTPATIENT)
Dept: CARDIOLOGY CLINIC | Age: 79
End: 2024-03-04
Payer: MEDICARE

## 2024-03-04 DIAGNOSIS — I48.0 PAROXYSMAL ATRIAL FIBRILLATION (HCC): Primary | Chronic | ICD-10-CM

## 2024-03-04 LAB — INR BLD: 2.5

## 2024-03-04 PROCEDURE — 93793 ANTICOAG MGMT PT WARFARIN: CPT | Performed by: NURSE PRACTITIONER

## 2024-03-18 ENCOUNTER — ANTI-COAG VISIT (OUTPATIENT)
Dept: CARDIOLOGY CLINIC | Age: 79
End: 2024-03-18
Payer: MEDICARE

## 2024-03-18 DIAGNOSIS — I48.0 PAROXYSMAL ATRIAL FIBRILLATION (HCC): Primary | Chronic | ICD-10-CM

## 2024-03-18 LAB — INR BLD: 3.3

## 2024-03-18 PROCEDURE — 93793 ANTICOAG MGMT PT WARFARIN: CPT | Performed by: NURSE PRACTITIONER

## 2024-03-25 ENCOUNTER — ANTI-COAG VISIT (OUTPATIENT)
Dept: CARDIOLOGY CLINIC | Age: 79
End: 2024-03-25
Payer: MEDICARE

## 2024-03-25 DIAGNOSIS — Z95.2 S/P AVR (AORTIC VALVE REPLACEMENT): Primary | Chronic | ICD-10-CM

## 2024-03-25 LAB — INR BLD: 2.6

## 2024-03-25 PROCEDURE — 93793 ANTICOAG MGMT PT WARFARIN: CPT

## 2024-04-08 LAB — INR BLD: 3.2

## 2024-04-09 ENCOUNTER — TELEPHONE (OUTPATIENT)
Dept: CARDIOLOGY CLINIC | Age: 79
End: 2024-04-09

## 2024-04-09 ENCOUNTER — ANTI-COAG VISIT (OUTPATIENT)
Dept: CARDIOLOGY CLINIC | Age: 79
End: 2024-04-09
Payer: MEDICARE

## 2024-04-09 DIAGNOSIS — I48.0 PAROXYSMAL ATRIAL FIBRILLATION (HCC): Primary | Chronic | ICD-10-CM

## 2024-04-09 PROCEDURE — 93793 ANTICOAG MGMT PT WARFARIN: CPT | Performed by: NURSE PRACTITIONER

## 2024-04-09 NOTE — TELEPHONE ENCOUNTER
I again attempted to call the patient and had to [facility] for him to return call. WHEN HE CALLS BACK PLEASE RELAY MESSAGE ALREADY IN ENCOUNTER.       As stated in this encounter, patient needs to recheck in one week. Vitamin K lowers INR levels-he was very minimally out of range and just needs to follow the medication change for today's dose.

## 2024-04-09 NOTE — TELEPHONE ENCOUNTER
4/8/2024 INR 3.2   Per protocol decrease dose by 5-10%, patient will take 1.25 mg today (half a tablet). Re check INR in 7 days per protocol. LV for patient to call back to obtain updated dose and re check and detailed message.

## 2024-04-09 NOTE — TELEPHONE ENCOUNTER
Pt contacted office, pt would like to know if he needs to test next week or wait 2 weeks? Pt would also like to know if vit K thickens or thins blood?

## 2024-04-09 NOTE — TELEPHONE ENCOUNTER
LVM for patient--please see instructions per Trinidad (copied from anticoag encounter) regarding dosing.

## 2024-04-09 NOTE — TELEPHONE ENCOUNTER
Pt states he has questions about his INR and would like to speak with a member of medical staff. Please advise.

## 2024-04-10 NOTE — TELEPHONE ENCOUNTER
Attempted to call pt regarding previous message. LVM for return call. If pt calls back please relay RNEW previous message

## 2024-04-15 NOTE — PROGRESS NOTES
[facility]   Electrophysiology Outpatient Note              Date:  April 16, 2024  Patient name: Ede Zacarias  YOB: 1945    Primary Care physician: Justin Cuenca APRN - NP    HISTORY OF PRESENT ILLNESS: The patient is a 78 y.o.  male with a history of AF, AFL, AV block, status post aortic valve replacement, VSD closure, HTN, HLD and thyroid disease.    In 2006 patient underwent aortic valve replacement and closure of 8 VSD.  In 2012, he had a radiofrequency catheter ablation for atrial flutter.  In 2014, patient had AV block and had a pacemaker implanted.  In 2019, patient underwent PVI for atrial fibrillation.  In 11/2021, echo showed an EF of 55%.  In 5/2022, patient wore an event monitor that showed predominantly normal sinus rhythm with an average heart rate 78 and a 30% burden of mostly uniform PVCs.  In 7/2022, echo showed an EF of 55 to 60%.  Today he is being seen for AV block, atrial arrhythmias and PVC's. EKG shows SR with a HR of 87 and frequent PVC's. He is feeling well. Denies chest pain, palpitations, shortness of breath, and dizziness.       Device check today shows:   Brand: Abbott  Mode: DDDR  Normal function   48% AP  4.6%    Arrhythmias: AF 12%, PVC's 18%  Battery life < 3 months  RA impedance 460 ohms   RV impedance 440 ohms   RA threshold 1.0 V @ 0.40 ms  RV threshold 1.75 V @ 0.40 ms        Past Medical History:   has a past medical history of Arthritis, Atrial fibrillation (HCC), CAD (coronary artery disease), Cardiac pacemaker, Hyperlipidemia, Hypertension, Mitral valve disease, and Thyroid disease.    Past Surgical History:   has a past surgical history that includes Cardiac surgery (2009); cyst removal (1972); skin biopsy; Pacemaker insertion; Atrial ablation surgery (12-28-12); ventral hernia repair; Diagnostic Cardiac Cath Lab Procedure; Atrial ablation surgery (11/2019); and TURP (N/A, 9/5/2023).     Home Medications:    Prior to Admission

## 2024-04-16 ENCOUNTER — NURSE ONLY (OUTPATIENT)
Dept: CARDIOLOGY CLINIC | Age: 79
End: 2024-04-16
Payer: MEDICARE

## 2024-04-16 ENCOUNTER — TELEPHONE (OUTPATIENT)
Dept: CARDIOLOGY CLINIC | Age: 79
End: 2024-04-16

## 2024-04-16 ENCOUNTER — OFFICE VISIT (OUTPATIENT)
Dept: CARDIOLOGY CLINIC | Age: 79
End: 2024-04-16
Payer: MEDICARE

## 2024-04-16 VITALS
OXYGEN SATURATION: 98 % | WEIGHT: 178 LBS | SYSTOLIC BLOOD PRESSURE: 100 MMHG | HEIGHT: 70 IN | BODY MASS INDEX: 25.48 KG/M2 | DIASTOLIC BLOOD PRESSURE: 58 MMHG | HEART RATE: 64 BPM

## 2024-04-16 DIAGNOSIS — I10 ESSENTIAL HYPERTENSION: ICD-10-CM

## 2024-04-16 DIAGNOSIS — I44.2 ATRIOVENTRICULAR BLOCK, COMPLETE (HCC): ICD-10-CM

## 2024-04-16 DIAGNOSIS — Z95.0 CARDIAC PACEMAKER IN SITU: Primary | ICD-10-CM

## 2024-04-16 DIAGNOSIS — I44.2 ATRIOVENTRICULAR BLOCK, COMPLETE (HCC): Primary | ICD-10-CM

## 2024-04-16 DIAGNOSIS — I48.0 PAROXYSMAL ATRIAL FIBRILLATION (HCC): ICD-10-CM

## 2024-04-16 PROCEDURE — 93280 PM DEVICE PROGR EVAL DUAL: CPT | Performed by: INTERNAL MEDICINE

## 2024-04-16 PROCEDURE — 99214 OFFICE O/P EST MOD 30 MIN: CPT | Performed by: NURSE PRACTITIONER

## 2024-04-16 PROCEDURE — 1123F ACP DISCUSS/DSCN MKR DOCD: CPT | Performed by: NURSE PRACTITIONER

## 2024-04-16 PROCEDURE — G8417 CALC BMI ABV UP PARAM F/U: HCPCS | Performed by: NURSE PRACTITIONER

## 2024-04-16 PROCEDURE — 3078F DIAST BP <80 MM HG: CPT | Performed by: NURSE PRACTITIONER

## 2024-04-16 PROCEDURE — 1036F TOBACCO NON-USER: CPT | Performed by: NURSE PRACTITIONER

## 2024-04-16 PROCEDURE — 3074F SYST BP LT 130 MM HG: CPT | Performed by: NURSE PRACTITIONER

## 2024-04-16 PROCEDURE — G8427 DOCREV CUR MEDS BY ELIG CLIN: HCPCS | Performed by: NURSE PRACTITIONER

## 2024-04-16 RX ORDER — DILTIAZEM HYDROCHLORIDE 180 MG/1
CAPSULE, COATED, EXTENDED RELEASE ORAL
Qty: 90 CAPSULE | Refills: 1 | Status: SHIPPED | OUTPATIENT
Start: 2024-04-16

## 2024-04-16 RX ORDER — METOPROLOL SUCCINATE 100 MG/1
100 TABLET, EXTENDED RELEASE ORAL NIGHTLY
Qty: 90 TABLET | Refills: 3 | Status: SHIPPED | OUTPATIENT
Start: 2024-04-16

## 2024-04-16 RX ORDER — WARFARIN SODIUM 2.5 MG/1
2.5 TABLET ORAL NIGHTLY
Qty: 90 TABLET | Refills: 11 | Status: SHIPPED | OUTPATIENT
Start: 2024-04-16

## 2024-04-16 RX ORDER — FUROSEMIDE 40 MG/1
TABLET ORAL
Qty: 90 TABLET | Refills: 3 | Status: SHIPPED | OUTPATIENT
Start: 2024-04-16

## 2024-04-16 NOTE — TELEPHONE ENCOUNTER
----- Message from SALVADOR Nicholas CNP sent at 4/16/2024  1:32 PM EDT -----  Regarding: Labs  Please obtain most recent labs from [facility]. Provider name is Raz Cuenca. Thanks.

## 2024-04-16 NOTE — PATIENT INSTRUCTIONS
NO changes today     Refills sent     We will request your blood work from PCP     Remote device transmissions every three months. We will call you when it is time to schedule the battery change.     Follow up in 6 months

## 2024-04-17 NOTE — TELEPHONE ENCOUNTER
[facility] Labs are under [facility]  the most recent 03/22/2024

## 2024-04-22 ENCOUNTER — ANTI-COAG VISIT (OUTPATIENT)
Dept: CARDIOLOGY CLINIC | Age: 79
End: 2024-04-22
Payer: MEDICARE

## 2024-04-22 DIAGNOSIS — I48.0 PAROXYSMAL ATRIAL FIBRILLATION (HCC): Primary | Chronic | ICD-10-CM

## 2024-04-22 LAB — INR BLD: 2.6

## 2024-04-22 PROCEDURE — 93793 ANTICOAG MGMT PT WARFARIN: CPT

## 2024-04-25 ENCOUNTER — TELEPHONE (OUTPATIENT)
Dept: CARDIOLOGY CLINIC | Age: 79
End: 2024-04-25

## 2024-04-25 NOTE — TELEPHONE ENCOUNTER
[facility] ALERT FOR BATTERY CHECK.  DC  PPM triggered PARVIN 4/25/2024.    LOV 4/18/2024 w/ NPAM.  Pt follows JOSE L for EP.      See MURJ report under cardiology tab once EP reviews.

## 2024-04-26 NOTE — TELEPHONE ENCOUNTER
Called and spoke with patient and relayed message and recommendations. Patient stated he had discussed this with NPAM at 4/16/2024 and is willing to proceed and did not have any further questions. He would like JOSE L to be the one to do generator change.     Katarzyna-please reach out to patient to schedule. Thanks.     MEDS TO HOLD:  - Lasix and multivitamin the morning of the procedure

## 2024-04-26 NOTE — TELEPHONE ENCOUNTER
Torin Gupta MD Rice, Lisa; Urmila Self, SALVADOR - EVELYN; Katarzyna Chand; Devonte Ruelas Ep8 hours ago (11:23 PM)       Patient does not appear to be pacemaker dependent. Please schedule generator change soon with [facility]. Copying Katarzyna to contact patient and arrange in next few weeks.    Office nurses, please contact patient to see if they are OK with that plan and if he has any questions.

## 2024-04-30 NOTE — TELEPHONE ENCOUNTER
Procedure:  Gen Change  Doctor:  Dr. Maldonado  Date:  5/31/24  Time:  8am  Arrival:  6:30am  Reps:  Abbott  Anesthesia:  n/a      Spoke with patient. Please have patient arrive to the main entrance of [facility] ([address] [place] [id number]) and check in with the registration desk.  They will be directed to the Cath Lab.  Please call patient regarding medication instructions. Remind patient to be NPO after midnight (8 hours prior). Do not apply lotions/creams on skin the day of procedure.

## 2024-05-06 ENCOUNTER — ANTI-COAG VISIT (OUTPATIENT)
Dept: CARDIOLOGY CLINIC | Age: 79
End: 2024-05-06
Payer: MEDICARE

## 2024-05-06 DIAGNOSIS — I48.0 PAROXYSMAL ATRIAL FIBRILLATION (HCC): Primary | Chronic | ICD-10-CM

## 2024-05-06 LAB — INR BLD: 2.2

## 2024-05-06 PROCEDURE — 93793 ANTICOAG MGMT PT WARFARIN: CPT

## 2024-05-20 ENCOUNTER — ANTI-COAG VISIT (OUTPATIENT)
Dept: CARDIOLOGY CLINIC | Age: 79
End: 2024-05-20
Payer: MEDICARE

## 2024-05-20 DIAGNOSIS — Z95.2 S/P AVR (AORTIC VALVE REPLACEMENT): Primary | Chronic | ICD-10-CM

## 2024-05-20 LAB — INR BLD: 3.3

## 2024-05-20 PROCEDURE — 93793 ANTICOAG MGMT PT WARFARIN: CPT

## 2024-05-22 DIAGNOSIS — E78.2 MIXED HYPERLIPIDEMIA: ICD-10-CM

## 2024-05-22 RX ORDER — EZETIMIBE 10 MG/1
10 TABLET ORAL DAILY
Qty: 90 TABLET | Refills: 0 | Status: SHIPPED | OUTPATIENT
Start: 2024-05-22

## 2024-05-28 ENCOUNTER — ANTI-COAG VISIT (OUTPATIENT)
Dept: CARDIOLOGY CLINIC | Age: 79
End: 2024-05-28
Payer: MEDICARE

## 2024-05-28 DIAGNOSIS — Z95.2 S/P AVR (AORTIC VALVE REPLACEMENT): Chronic | ICD-10-CM

## 2024-05-28 DIAGNOSIS — I48.0 PAROXYSMAL ATRIAL FIBRILLATION (HCC): Primary | Chronic | ICD-10-CM

## 2024-05-28 LAB — INR BLD: 2.2

## 2024-05-28 PROCEDURE — 93793 ANTICOAG MGMT PT WARFARIN: CPT | Performed by: NURSE PRACTITIONER

## 2024-05-31 ENCOUNTER — HOSPITAL ENCOUNTER (OUTPATIENT)
Age: 79
Discharge: HOME | End: 2024-05-31
Attending: INTERNAL MEDICINE | Admitting: INTERNAL MEDICINE
Payer: MEDICARE

## 2024-05-31 ENCOUNTER — NURSE ONLY (OUTPATIENT)
Dept: CARDIOLOGY CLINIC | Age: 79
End: 2024-05-31

## 2024-05-31 VITALS
HEIGHT: 70 IN | OXYGEN SATURATION: 96 % | HEART RATE: 64 BPM | BODY MASS INDEX: 25.34 KG/M2 | SYSTOLIC BLOOD PRESSURE: 132 MMHG | DIASTOLIC BLOOD PRESSURE: 78 MMHG | WEIGHT: 177 LBS | RESPIRATION RATE: 18 BRPM

## 2024-05-31 DIAGNOSIS — Z95.0 CARDIAC PACEMAKER IN SITU: ICD-10-CM

## 2024-05-31 DIAGNOSIS — I44.2 ATRIOVENTRICULAR BLOCK, COMPLETE (HCC): ICD-10-CM

## 2024-05-31 DIAGNOSIS — Z45.010 PACEMAKER BATTERY DEPLETION: Primary | ICD-10-CM

## 2024-05-31 LAB
ANION GAP SERPL CALCULATED.3IONS-SCNC: 7 MMOL/L (ref 3–16)
BUN SERPL-MCNC: 24 MG/DL (ref 7–20)
CALCIUM SERPL-MCNC: 9.4 MG/DL (ref 8.3–10.6)
CHLORIDE SERPL-SCNC: 104 MMOL/L (ref 99–110)
CO2 SERPL-SCNC: 28 MMOL/L (ref 21–32)
CREAT SERPL-MCNC: 0.8 MG/DL (ref 0.8–1.3)
DEPRECATED RDW RBC AUTO: 14.7 % (ref 12.4–15.4)
ECHO BSA: 1.99 M2
EKG ATRIAL RATE: 55 BPM
EKG DIAGNOSIS: NORMAL
EKG P AXIS: 43 DEGREES
EKG P-R INTERVAL: 158 MS
EKG Q-T INTERVAL: 450 MS
EKG QRS DURATION: 138 MS
EKG QTC CALCULATION (BAZETT): 430 MS
EKG R AXIS: -36 DEGREES
EKG T AXIS: 12 DEGREES
EKG VENTRICULAR RATE: 55 BPM
GFR SERPLBLD CREATININE-BSD FMLA CKD-EPI: >90 ML/MIN/{1.73_M2}
GLUCOSE SERPL-MCNC: 100 MG/DL (ref 70–99)
HCT VFR BLD AUTO: 41 % (ref 40.5–52.5)
HGB BLD-MCNC: 13.6 G/DL (ref 13.5–17.5)
INR PPP: 1.8 (ref 0.85–1.15)
MCH RBC QN AUTO: 32.1 PG (ref 26–34)
MCHC RBC AUTO-ENTMCNC: 33.2 G/DL (ref 31–36)
MCV RBC AUTO: 96.4 FL (ref 80–100)
PLATELET # BLD AUTO: 169 K/UL (ref 135–450)
PMV BLD AUTO: 7.8 FL (ref 5–10.5)
POTASSIUM SERPL-SCNC: 5.2 MMOL/L (ref 3.5–5.1)
PROTHROMBIN TIME: 21 SEC (ref 11.9–14.9)
RBC # BLD AUTO: 4.25 M/UL (ref 4.2–5.9)
SODIUM SERPL-SCNC: 139 MMOL/L (ref 136–145)
WBC # BLD AUTO: 4.9 K/UL (ref 4–11)

## 2024-05-31 PROCEDURE — 2709999900 HC NON-CHARGEABLE SUPPLY: Performed by: INTERNAL MEDICINE

## 2024-05-31 PROCEDURE — 6360000002 HC RX W HCPCS

## 2024-05-31 PROCEDURE — 7100000010 HC PHASE II RECOVERY - FIRST 15 MIN: Performed by: INTERNAL MEDICINE

## 2024-05-31 PROCEDURE — 33228 REMV&REPLC PM GEN DUAL LEAD: CPT | Performed by: INTERNAL MEDICINE

## 2024-05-31 PROCEDURE — 2580000003 HC RX 258: Performed by: INTERNAL MEDICINE

## 2024-05-31 PROCEDURE — C1785 PMKR, DUAL, RATE-RESP: HCPCS | Performed by: INTERNAL MEDICINE

## 2024-05-31 PROCEDURE — 99152 MOD SED SAME PHYS/QHP 5/>YRS: CPT | Performed by: INTERNAL MEDICINE

## 2024-05-31 PROCEDURE — 2500000003 HC RX 250 WO HCPCS

## 2024-05-31 PROCEDURE — 80048 BASIC METABOLIC PNL TOTAL CA: CPT

## 2024-05-31 PROCEDURE — 7100000011 HC PHASE II RECOVERY - ADDTL 15 MIN: Performed by: INTERNAL MEDICINE

## 2024-05-31 PROCEDURE — 93005 ELECTROCARDIOGRAM TRACING: CPT | Performed by: INTERNAL MEDICINE

## 2024-05-31 PROCEDURE — 6360000002 HC RX W HCPCS: Performed by: INTERNAL MEDICINE

## 2024-05-31 PROCEDURE — 85610 PROTHROMBIN TIME: CPT

## 2024-05-31 PROCEDURE — 85027 COMPLETE CBC AUTOMATED: CPT

## 2024-05-31 PROCEDURE — 99153 MOD SED SAME PHYS/QHP EA: CPT | Performed by: INTERNAL MEDICINE

## 2024-05-31 PROCEDURE — 93010 ELECTROCARDIOGRAM REPORT: CPT | Performed by: INTERNAL MEDICINE

## 2024-05-31 PROCEDURE — 2500000003 HC RX 250 WO HCPCS: Performed by: INTERNAL MEDICINE

## 2024-05-31 DEVICE — DR PACEMAKER DDDR
Type: IMPLANTABLE DEVICE | Site: CHEST | Status: FUNCTIONAL
Brand: ASSURITY MRI™

## 2024-05-31 RX ORDER — FENTANYL CITRATE 50 UG/ML
INJECTION, SOLUTION INTRAMUSCULAR; INTRAVENOUS PRN
Status: DISCONTINUED | OUTPATIENT
Start: 2024-05-31 | End: 2024-05-31 | Stop reason: HOSPADM

## 2024-05-31 RX ORDER — MIDAZOLAM HYDROCHLORIDE 1 MG/ML
INJECTION INTRAMUSCULAR; INTRAVENOUS PRN
Status: DISCONTINUED | OUTPATIENT
Start: 2024-05-31 | End: 2024-05-31 | Stop reason: HOSPADM

## 2024-05-31 RX ORDER — SODIUM CHLORIDE 0.9 % (FLUSH) 0.9 %
5-40 SYRINGE (ML) INJECTION PRN
Status: DISCONTINUED | OUTPATIENT
Start: 2024-05-31 | End: 2024-05-31 | Stop reason: HOSPADM

## 2024-05-31 RX ORDER — SODIUM CHLORIDE 9 MG/ML
INJECTION, SOLUTION INTRAVENOUS PRN
Status: DISCONTINUED | OUTPATIENT
Start: 2024-05-31 | End: 2024-05-31 | Stop reason: HOSPADM

## 2024-05-31 RX ORDER — BUPIVACAINE HYDROCHLORIDE 5 MG/ML
INJECTION, SOLUTION EPIDURAL; INTRACAUDAL PRN
Status: DISCONTINUED | OUTPATIENT
Start: 2024-05-31 | End: 2024-05-31 | Stop reason: HOSPADM

## 2024-05-31 RX ORDER — SODIUM CHLORIDE 0.9 % (FLUSH) 0.9 %
5-40 SYRINGE (ML) INJECTION EVERY 12 HOURS SCHEDULED
Status: DISCONTINUED | OUTPATIENT
Start: 2024-05-31 | End: 2024-05-31 | Stop reason: HOSPADM

## 2024-05-31 ASSESSMENT — PULMONARY FUNCTION TESTS
PIF_VALUE: 1
PIF_VALUE: 0
PIF_VALUE: 1

## 2024-05-31 NOTE — DISCHARGE INSTRUCTIONS
Cath Labs at  [facility]    Pacemaker Discharge Instructions    5/31/2024  Ede Zacarias   Date of Birth 1945     Activity:  You can ride in a car, but no driving for 24 hours.  Do not raise your left arm above your heart for 2 weeks.  Resume regular activities in 24 hours.  Return to work/ school in 24 hours.    Diet:    Resume previous diet.    Dressing:  Keep site clean and dry until cleared by your physician.  Remove the outer dressing in 48 hours, leave steri strips intact until seen in office.  Ice pack to pacer site as needed for pain next 24 hours.    Special Instructions:  Report any of the following to physician or 911:  Any difficulty breathing, change in heart rhythm, change in level or consciousness or alertness, fever or chills.  Small amount of bruising and drainage can be expected.  Any questions or concerns please contact your doctor.  If you received contrast during your new pacemaker placement, and you are currently taking Metformin or Metformin combination medications for Diabetes, hold your dose for 48 hours after your procedure.  If you have any questions, please call your doctor.  If you cannot reach your Doctor then call the Emergency Department at  [phone].  Explain to the Emergency Department the procedure you had performed and they will be able to assist you.  If you seek Emergency Care, bring this form with you.      Sedation Discharge Instructions:  For the next 24 hours do not drive a car, operate machinery, power tools or kitchen appliances.    Do not drink alcohol; including beer or wine.    Do not make any important decisions or sign any important papers.  For the next 24 hours you can expect drowsiness, light-headed or dizziness, nausea/ vomiting, inability to concentrate, fatigue and desire to sleep.  We strongly suggest that a responsible adult be with for the next 24 hours, for your protection and safety.  You are not allowed to  drive yourself home, or take any type of public transportation.

## 2024-05-31 NOTE — H&P
Patient name:  Ede Zacarias  YOB: 1945     Primary Care physician: Justin Cuenca APRN - NP     HISTORY OF PRESENT ILLNESS: The patient is a 78 y.o.  male with a history of AF, AFL, AV block, status post aortic valve replacement, VSD closure, HTN, HLD and thyroid disease.               In 2006 patient underwent aortic valve replacement and closure of 8 VSD.  In 2012, he had a radiofrequency catheter ablation for atrial flutter.  In 2014, patient had AV block and had a pacemaker implanted.  In 2019, patient underwent PVI for atrial fibrillation.  In 11/2021, echo showed an EF of 55%.  In 5/2022, patient wore an event monitor that showed predominantly normal sinus rhythm with an average heart rate 78 and a 30% burden of mostly uniform PVCs.  In 7/2022, echo showed an EF of 55 to 60%.  Today he is being seen for AV block, atrial arrhythmias and PVC's. EKG shows SR with a HR of 87 and frequent PVC's. He is feeling well. Denies chest pain, palpitations, shortness of breath, and dizziness.         Device check today shows:   Brand: Abbott  Mode: DDDR  Normal function   48% AP  4.6%    Arrhythmias: AF 12%, PVC's 18%  Battery life < 3 months  RA impedance 460 ohms   RV impedance 440 ohms   RA threshold 1.0 V @ 0.40 ms  RV threshold 1.75 V @ 0.40 ms           Past Medical History:   has a past medical history of Arthritis, Atrial fibrillation (HCC), CAD (coronary artery disease), Cardiac pacemaker, Hyperlipidemia, Hypertension, Mitral valve disease, and Thyroid disease.     Past Surgical History:   has a past surgical history that includes Cardiac surgery (2009); cyst removal (1972); skin biopsy; Pacemaker insertion; Atrial ablation surgery (12-28-12); ventral hernia repair; Diagnostic Cardiac Cath Lab Procedure; Atrial ablation surgery (11/2019); and TURP (N/A, 9/5/2023).      Home Medications:    Home Medications           Prior to Admission medications    Medication Sig Start Date End Date  expansion without use of accessory muscles  Resp auscultation: Normal breath sounds without wheezing, rhonchi, and rales  Cardiovascular:  The apical impulse is not displaced  Heart tones are crisp and normal. regular S1 and S2.  Jugular venous pulsation Normal  The carotid upstroke is normal in amplitude and contour without delay or bruit  Peripheral pulses are symmetrical and full   Abdomen:  No masses or tenderness  Bowel sounds present  Extremities:   No cyanosis or clubbing   No lower extremity edema   Skin: warm and dry  Neurological:  Alert and oriented  Moves all extremities well  No abnormalities of mood, affect, memory, mentation, or behavior are noted     DATA:    ECG 4/16/2024:  SR with PVC's 87 bpm     Echo 7/2022:      Summary   Limited exam performed for palpitations. PVC's are noted throughout the   study.   Left ventricular systolic function is normal with ejection fraction   estimated at 55-60%.   Sub-optimal endocardial definition and frequent PVC's, within the limits of   the study, No obvious regional wall motion abnormalities.   A bioprosthetic aortic valve appears well seated with a peak velocity of   2.59 msec., a maximum gradient of 26.83 mmHg and a mean gradient of 15 mmHg.   Mild bettie-valvular aortic regurgitation.   Mild mitral regurgitation.   Mild pulmonic regurgitation.   Mild tricuspid regurgitation.   Systolic pulmonary artery pressure (SPAP) is estimated at 41 mmHg consistent   with mild pulmonary hypertension (Right atrial pressure of 3 mmHg).     Echo 11/2021     Summary   LV systolic function is normal with EF estimated at 55%.   No regional wall motion abnormalities.   Grade II diastolic dysfunction with elevated LV filling pressure.   Mild bi-atrial enlargement.   A bioprosthetic artificial aortic valve appears well seated and normal   functioning.   Mild-moderate mitral regurgitation.   Mild eccentric aortic regurgitation.   Mild tricuspid and pulmonic regurgitation.

## 2024-05-31 NOTE — PROGRESS NOTES
Gen change scheduled 5/31/2024        [email or web address] updated for monitor to connect to new device automatically.         I made a few adjustments to try to optimize the device function.  Increasing the PW to 0.7 ms from 0.4 ms allowed me to program the output to 2.5V from 3.5V to conserve battery.  This avoided using the voltage doubler and now only drawing 14 uA instead of 16 uA.  I also dropped the AVDs to 225/200 from 300/275 and VIP to +150 from +100.  At the higher AVDs, the device can sometimes ignore PVARP due to not a long enough atrial alert period and then cause PMT.  Those changes should still allow appropriate conduction and reduction of %

## 2024-05-31 NOTE — PROGRESS NOTES
VSS on room air. Tolerated sips of soda and peanut butter crackers. Patient and his wife given discharge instructions. Questions answered. Dr. Maldonado visited bedside. IV removed after patient got dressed. Chest site remained soft and dry. Wheeled to front entrance and wife drove him home.

## 2024-06-06 ENCOUNTER — NURSE ONLY (OUTPATIENT)
Dept: CARDIOLOGY CLINIC | Age: 79
End: 2024-06-06

## 2024-06-06 DIAGNOSIS — Z95.0 CARDIAC PACEMAKER IN SITU: Primary | ICD-10-CM

## 2024-06-06 DIAGNOSIS — I48.0 PAROXYSMAL ATRIAL FIBRILLATION (HCC): ICD-10-CM

## 2024-06-10 ENCOUNTER — ANTI-COAG VISIT (OUTPATIENT)
Dept: CARDIOLOGY CLINIC | Age: 79
End: 2024-06-10
Payer: MEDICARE

## 2024-06-10 DIAGNOSIS — I48.0 PAROXYSMAL ATRIAL FIBRILLATION (HCC): Primary | Chronic | ICD-10-CM

## 2024-06-10 LAB — INR BLD: 3

## 2024-06-10 PROCEDURE — 93793 ANTICOAG MGMT PT WARFARIN: CPT | Performed by: NURSE PRACTITIONER

## 2024-06-10 NOTE — PROGRESS NOTES
[facility]   Cardiac Follow Up    Referring Provider:  Justin Cuenca APRN - NP     Chief Complaint   Patient presents with    Follow-up    Atrial Fibrillation    Hypertension    Hyperlipidemia    Other     Nonobstructive atherosclerosis of coronary artery        Subjective:  Patient being seen today for routine cardiology follow up of HTN, HLD, PAF, PPM; no complaints today    Past Medical History:   Mr Zacarias 75yo male who has PMH porcine AVR and VSD closure in 12/11/06 complicated by post-op AV block s/p  (s/p , frequent PVC's (Dr. Maldonado), PAF/flutter s/p RFA, HLD, and NOCAD. He had RFA for aflutter on 12/28/12 and again 2/13/19 by Dr. Lin (on coumadin regulated by our office--has home machine--DOAC too expensive). Prior took amiodarone but now d/c'd and hx chronically elevated LFT's. Renea nuc 1/9/19 abnormal. LHC 1/11/19 LM <10% LAD prox, mid-distal <10%, Lcx prox-mid 60-70%, RCA prox-mid 20-30% mid distal 50-60%. Moderate LCx/RCA disease. Medical mgt without PCI recommended.   24 hour 5/13/22 noted SR with occasional; brief PAT; very frequent mostly uniform PVC' (30% burden). Limited Echo 7/5/22 EF=55-60% (same 11/21, 1/19); bio. AVR well seated with a peak velocity of 2.59 msec; MPG 15 mmHg; Mild bettie-valvular AI; Mild MR/TN/TR. He refuses statins (rash with crestor) and does not want Repatha or Leqvio.   S/P replacement with dual chamber St. Geoff  on 5/31/24. EKG 5/31/24 Electronic atrial pacemaker 55bpm; RBBB; inferior infarct. Device check 6/25/24 APACING 49% RV PACING 4.9% AT/AF BURDEN 3% PARVIN 8.75 years.   Today, he has a little hematoma around his incision site but otherwise healing well. He reports doing well without any issues. Patient denies current edema, CP, SOB, palpitations, dizziness or syncope. Patient is taking all cardiac meddas prescribed and tolerates them well.     Weight today is 177# (Up 1# from 03/06/2023)    Past Medical History:   has a past medical history of

## 2024-06-24 ENCOUNTER — ANTI-COAG VISIT (OUTPATIENT)
Dept: CARDIOLOGY CLINIC | Age: 79
End: 2024-06-24
Payer: MEDICARE

## 2024-06-24 DIAGNOSIS — I48.0 PAROXYSMAL ATRIAL FIBRILLATION (HCC): Primary | Chronic | ICD-10-CM

## 2024-06-24 LAB — INR BLD: 2.1

## 2024-06-24 PROCEDURE — 93793 ANTICOAG MGMT PT WARFARIN: CPT | Performed by: NURSE PRACTITIONER

## 2024-06-27 ENCOUNTER — OFFICE VISIT (OUTPATIENT)
Dept: CARDIOLOGY CLINIC | Age: 79
End: 2024-06-27
Payer: MEDICARE

## 2024-06-27 VITALS
DIASTOLIC BLOOD PRESSURE: 50 MMHG | SYSTOLIC BLOOD PRESSURE: 106 MMHG | WEIGHT: 177 LBS | OXYGEN SATURATION: 98 % | HEIGHT: 70 IN | BODY MASS INDEX: 25.34 KG/M2 | HEART RATE: 80 BPM

## 2024-06-27 DIAGNOSIS — E78.2 MIXED HYPERLIPIDEMIA: ICD-10-CM

## 2024-06-27 PROCEDURE — 3078F DIAST BP <80 MM HG: CPT | Performed by: INTERNAL MEDICINE

## 2024-06-27 PROCEDURE — G8427 DOCREV CUR MEDS BY ELIG CLIN: HCPCS | Performed by: INTERNAL MEDICINE

## 2024-06-27 PROCEDURE — 1123F ACP DISCUSS/DSCN MKR DOCD: CPT | Performed by: INTERNAL MEDICINE

## 2024-06-27 PROCEDURE — G8417 CALC BMI ABV UP PARAM F/U: HCPCS | Performed by: INTERNAL MEDICINE

## 2024-06-27 PROCEDURE — 99214 OFFICE O/P EST MOD 30 MIN: CPT | Performed by: INTERNAL MEDICINE

## 2024-06-27 PROCEDURE — 3074F SYST BP LT 130 MM HG: CPT | Performed by: INTERNAL MEDICINE

## 2024-06-27 PROCEDURE — 1036F TOBACCO NON-USER: CPT | Performed by: INTERNAL MEDICINE

## 2024-06-27 RX ORDER — EZETIMIBE 10 MG/1
10 TABLET ORAL DAILY
Qty: 90 TABLET | Refills: 3 | Status: SHIPPED | OUTPATIENT
Start: 2024-06-27

## 2024-06-27 RX ORDER — DILTIAZEM HYDROCHLORIDE 180 MG/1
CAPSULE, COATED, EXTENDED RELEASE ORAL
Qty: 90 CAPSULE | Refills: 3 | Status: SHIPPED | OUTPATIENT
Start: 2024-06-27

## 2024-06-27 NOTE — PATIENT INSTRUCTIONS
Labs reviewed in epic and discussed with patient.  Medications reviewed in office. Medications refilled as warranted  Please limit to 2 bananas a week due to your potassium was slightly elevated.   We tried to switch you to xarelto or eliquis but both are $430+. You decided to stay on coumadin.

## 2024-07-08 ENCOUNTER — ANTI-COAG VISIT (OUTPATIENT)
Dept: CARDIOLOGY CLINIC | Age: 79
End: 2024-07-08
Payer: MEDICARE

## 2024-07-08 DIAGNOSIS — I48.0 PAROXYSMAL ATRIAL FIBRILLATION (HCC): Primary | Chronic | ICD-10-CM

## 2024-07-08 LAB — INR BLD: 2.9

## 2024-07-08 PROCEDURE — 93793 ANTICOAG MGMT PT WARFARIN: CPT

## 2024-07-22 ENCOUNTER — ANTI-COAG VISIT (OUTPATIENT)
Dept: CARDIOLOGY CLINIC | Age: 79
End: 2024-07-22
Payer: MEDICARE

## 2024-07-22 DIAGNOSIS — I48.0 PAROXYSMAL ATRIAL FIBRILLATION (HCC): Primary | Chronic | ICD-10-CM

## 2024-07-22 LAB — INR BLD: 2

## 2024-07-22 PROCEDURE — 93793 ANTICOAG MGMT PT WARFARIN: CPT | Performed by: NURSE PRACTITIONER

## 2024-07-29 ENCOUNTER — ANTI-COAG VISIT (OUTPATIENT)
Dept: CARDIOLOGY CLINIC | Age: 79
End: 2024-07-29

## 2024-07-29 LAB — INR BLD: 3.4

## 2024-08-05 ENCOUNTER — ANTI-COAG VISIT (OUTPATIENT)
Dept: CARDIOLOGY CLINIC | Age: 79
End: 2024-08-05
Payer: MEDICARE

## 2024-08-05 DIAGNOSIS — I48.0 PAROXYSMAL ATRIAL FIBRILLATION (HCC): Primary | Chronic | ICD-10-CM

## 2024-08-05 LAB — INR BLD: 4.3

## 2024-08-05 PROCEDURE — 93793 ANTICOAG MGMT PT WARFARIN: CPT | Performed by: NURSE PRACTITIONER

## 2024-08-09 ENCOUNTER — ANTI-COAG VISIT (OUTPATIENT)
Dept: CARDIOLOGY CLINIC | Age: 79
End: 2024-08-09
Payer: MEDICARE

## 2024-08-09 DIAGNOSIS — I48.0 PAROXYSMAL ATRIAL FIBRILLATION (HCC): Primary | Chronic | ICD-10-CM

## 2024-08-09 LAB — INR BLD: 3.4

## 2024-08-09 PROCEDURE — 93793 ANTICOAG MGMT PT WARFARIN: CPT

## 2024-08-16 ENCOUNTER — ANTI-COAG VISIT (OUTPATIENT)
Dept: CARDIOLOGY CLINIC | Age: 79
End: 2024-08-16

## 2024-08-16 DIAGNOSIS — I48.0 PAROXYSMAL ATRIAL FIBRILLATION (HCC): Primary | Chronic | ICD-10-CM

## 2024-08-16 LAB — INR BLD: 2

## 2024-09-02 LAB — INR BLD: 2.1

## 2024-09-03 ENCOUNTER — ANTI-COAG VISIT (OUTPATIENT)
Dept: CARDIOLOGY CLINIC | Age: 79
End: 2024-09-03
Payer: MEDICARE

## 2024-09-03 DIAGNOSIS — I48.0 PAROXYSMAL ATRIAL FIBRILLATION (HCC): Primary | Chronic | ICD-10-CM

## 2024-09-03 PROCEDURE — 93793 ANTICOAG MGMT PT WARFARIN: CPT | Performed by: NURSE PRACTITIONER

## 2024-09-16 ENCOUNTER — ANTI-COAG VISIT (OUTPATIENT)
Dept: CARDIOLOGY CLINIC | Age: 79
End: 2024-09-16
Payer: MEDICARE

## 2024-09-16 DIAGNOSIS — I48.0 PAROXYSMAL ATRIAL FIBRILLATION (HCC): Primary | Chronic | ICD-10-CM

## 2024-09-16 LAB — INR BLD: 2.5

## 2024-09-16 PROCEDURE — 93793 ANTICOAG MGMT PT WARFARIN: CPT | Performed by: NURSE PRACTITIONER

## 2024-09-30 ENCOUNTER — ANTI-COAG VISIT (OUTPATIENT)
Dept: CARDIOLOGY CLINIC | Age: 79
End: 2024-09-30
Payer: MEDICARE

## 2024-09-30 DIAGNOSIS — I48.0 PAROXYSMAL ATRIAL FIBRILLATION (HCC): Primary | Chronic | ICD-10-CM

## 2024-09-30 LAB — INR BLD: 2.4

## 2024-09-30 PROCEDURE — 93793 ANTICOAG MGMT PT WARFARIN: CPT | Performed by: NURSE PRACTITIONER

## 2024-10-03 NOTE — PROGRESS NOTES
[facility]   Cardiac Consultation    Date: 10/7/24  Patient Name: Ede Zacarias  YOB: 1945    Primary Care Physician: Justin Cuenca APRN - NP    CHIEF COMPLAINT:   Chief Complaint   Patient presents with    Follow-up     6 mos     Device Check    Atrial Flutter    Atrial Fibrillation       HPI:  Ede Zacarias is a 79 y.o. male with a history of permanent pacemaker for temporary AV block, post AVR (2006, porcine per patient report), and closure of VSD in 12/2006. He had a radiofrequency catheter ablation for atrial flutter on 12/28/12. He has a history of PAF. He underwent pulmonary vein isolation in 2019 and has done well since then. He is on coumadin for anticoagulation. He was following with EP at [facility], but is switching back to [facility] due to location. His echo on 11/26/2021 demonstrated an EF of 55%. 24 hour cardiac event monitor 5/4/2022 demonstrated predominately NSR with an average HR of 78 (60-92) BPM, 30% PVC burden (mostly uniform).    On 06/29/2022 patient reported he has been feeling well. He reported he works out at the [employer] 4 days per week and also works in the yard and tolerates those activities well.    In 7/2022 limited echo showed EF of 55-60%, no WMA, mild MR, mild TR and mild pulmonary HTN.   On 4/11/2023, he reports that he is feeling well overall. Device interrogation demonstrated AP 44%,  <1%, PVC burden 30%, PARVIN 3 years. He reports that he exercises in the gym 4 days a week. He uses the row machine for 20 minutes and walks 1/2 mile on the treadmill.    On 10/11/2023, Device interrogation demonstrated AP 46%,  3.7%, AT/AF burden <1% (longest episode 2 hours 51 mins 7/8/2023), PARVIN 1.6 years. He is still active with going to the gym on a regular basis and tolerates this activity well. He reports minor swelling in his lower extremities. He reamins asymptomatic with AF episodes.   On 5/31/2024 patient underwent generator change.    Today, 
Medical History:   has a past medical history of Arthritis, Atrial fibrillation (HCC), CAD (coronary artery disease), Cardiac pacemaker, Hyperlipidemia, Hypertension, Mitral valve disease, and Thyroid disease.    Surgical History:   has a past surgical history that includes Cardiac surgery (2009); cyst removal (1972); skin biopsy; Pacemaker insertion; Atrial ablation surgery (12-28-12); ventral hernia repair; Diagnostic Cardiac Cath Lab Procedure; Atrial ablation surgery (11/2019); TURP (N/A, 9/5/2023); and ep device procedure (N/A, 5/31/2024).     Social History:   reports that he has never smoked. He has never used smokeless tobacco. He reports current alcohol use. He reports that he does not use drugs.     Family History:  family history includes Cancer in his mother.     Home Medications:  Outpatient Encounter Medications as of 10/7/2024   Medication Sig Dispense Refill    ezetimibe (ZETIA) 10 MG tablet Take 1 tablet by mouth daily 90 tablet 3    dilTIAZem (CARDIZEM CD) 180 MG extended release capsule TAKE 1 CAPSULE DAILY 90 capsule 3    warfarin (COUMADIN) 2.5 MG tablet Take 1 tablet by mouth at bedtime 5 mg Mon & Fri, 2.5 mg remainder of days 90 tablet 11    metoprolol succinate (TOPROL XL) 100 MG extended release tablet Take 1 tablet by mouth nightly 90 tablet 3    furosemide (LASIX) 40 MG tablet Take 1 tablet by mouth  daily 90 tablet 3    levothyroxine (SYNTHROID) 88 MCG tablet TAKE 1 TABLET BY MOUTH EVERY DAY (Patient not taking: Reported on 4/16/2024) 90 tablet 1    finasteride (PROSCAR) 5 MG tablet Take 1 tablet by mouth daily (Patient not taking: Reported on 4/16/2024)      aspirin EC 81 MG EC tablet Take 1 tablet by mouth daily 90 tablet 3    Multiple Vitamins-Minerals (MULTIVITAMIN PO) Take 1 tablet by mouth daily.       No facility-administered encounter medications on file as of 10/7/2024.     DATA:  ECG 10/3/24: Personally reviewed.     All current cardiac medications reviewed and adjusted

## 2024-10-07 ENCOUNTER — OFFICE VISIT (OUTPATIENT)
Dept: CARDIOLOGY CLINIC | Age: 79
End: 2024-10-07

## 2024-10-07 ENCOUNTER — NURSE ONLY (OUTPATIENT)
Dept: CARDIOLOGY CLINIC | Age: 79
End: 2024-10-07

## 2024-10-07 VITALS
BODY MASS INDEX: 25.28 KG/M2 | WEIGHT: 176.6 LBS | DIASTOLIC BLOOD PRESSURE: 64 MMHG | OXYGEN SATURATION: 97 % | HEART RATE: 63 BPM | HEIGHT: 70 IN | SYSTOLIC BLOOD PRESSURE: 120 MMHG

## 2024-10-07 DIAGNOSIS — I48.0 PAROXYSMAL ATRIAL FIBRILLATION (HCC): Primary | Chronic | ICD-10-CM

## 2024-10-07 DIAGNOSIS — Z95.0 CARDIAC PACEMAKER IN SITU: Primary | ICD-10-CM

## 2024-10-07 DIAGNOSIS — I44.2 ATRIOVENTRICULAR BLOCK, COMPLETE (HCC): ICD-10-CM

## 2024-10-07 DIAGNOSIS — I48.3 TYPICAL ATRIAL FLUTTER (HCC): ICD-10-CM

## 2024-10-14 ENCOUNTER — ANTI-COAG VISIT (OUTPATIENT)
Dept: CARDIOLOGY CLINIC | Age: 79
End: 2024-10-14

## 2024-10-14 DIAGNOSIS — I48.0 PAROXYSMAL ATRIAL FIBRILLATION (HCC): Primary | Chronic | ICD-10-CM

## 2024-10-14 LAB — INR BLD: 2.3

## 2024-10-28 ENCOUNTER — ANTI-COAG VISIT (OUTPATIENT)
Dept: CARDIOLOGY CLINIC | Age: 79
End: 2024-10-28

## 2024-10-28 DIAGNOSIS — I48.0 PAROXYSMAL ATRIAL FIBRILLATION (HCC): Primary | Chronic | ICD-10-CM

## 2024-10-28 LAB — INR BLD: 2.2

## 2024-11-12 ENCOUNTER — ANTI-COAG VISIT (OUTPATIENT)
Dept: CARDIOLOGY CLINIC | Age: 79
End: 2024-11-12

## 2024-11-12 DIAGNOSIS — I48.0 PAROXYSMAL ATRIAL FIBRILLATION (HCC): Primary | Chronic | ICD-10-CM

## 2024-11-12 LAB — INR BLD: 3

## 2024-11-25 ENCOUNTER — ANTI-COAG VISIT (OUTPATIENT)
Dept: CARDIOLOGY CLINIC | Age: 79
End: 2024-11-25
Payer: MEDICARE

## 2024-11-25 DIAGNOSIS — I48.0 PAROXYSMAL ATRIAL FIBRILLATION (HCC): Primary | Chronic | ICD-10-CM

## 2024-11-25 LAB — INR BLD: 2

## 2024-11-25 PROCEDURE — 93793 ANTICOAG MGMT PT WARFARIN: CPT | Performed by: NURSE PRACTITIONER

## 2024-11-29 PROCEDURE — 93294 REM INTERROG EVL PM/LDLS PM: CPT | Performed by: INTERNAL MEDICINE

## 2024-11-29 PROCEDURE — 93296 REM INTERROG EVL PM/IDS: CPT | Performed by: INTERNAL MEDICINE

## 2024-12-02 ENCOUNTER — TELEPHONE (OUTPATIENT)
Dept: CARDIOLOGY CLINIC | Age: 79
End: 2024-12-02

## 2024-12-02 DIAGNOSIS — I48.0 PAROXYSMAL ATRIAL FIBRILLATION (HCC): ICD-10-CM

## 2024-12-02 RX ORDER — WARFARIN SODIUM 2.5 MG/1
2.5 TABLET ORAL NIGHTLY
Qty: 90 TABLET | Refills: 11 | Status: SHIPPED | OUTPATIENT
Start: 2024-12-02

## 2024-12-02 NOTE — TELEPHONE ENCOUNTER
Pt contacted office stating he has questions in regards to warfarin. Pt states he just picked up refill and dosing and frequency he believes is wrong and he would like to discuss this w/ someone. Please advise.

## 2024-12-02 NOTE — TELEPHONE ENCOUNTER
I spoke with the patient and the prescription he picked up from the pharmacy had incorrect instructions and quantity. I verified the correct instructions of what we had sent. Patient V/U. New script sent to pharmacy and patient will contact pharmacy with further issues.

## 2024-12-09 ENCOUNTER — ANTI-COAG VISIT (OUTPATIENT)
Dept: CARDIOLOGY CLINIC | Age: 79
End: 2024-12-09

## 2024-12-09 DIAGNOSIS — I48.0 PAROXYSMAL ATRIAL FIBRILLATION (HCC): Primary | Chronic | ICD-10-CM

## 2024-12-09 LAB — INR BLD: 2.2

## 2024-12-23 ENCOUNTER — ANTI-COAG VISIT (OUTPATIENT)
Dept: CARDIOLOGY CLINIC | Age: 79
End: 2024-12-23

## 2024-12-23 DIAGNOSIS — Z79.01 CHRONIC ANTICOAGULATION: Primary | ICD-10-CM

## 2024-12-23 LAB
INR BLD: 2.2
INR BLD: 2.2

## 2025-01-06 LAB — INR BLD: 2.4

## 2025-01-07 ENCOUNTER — ANTI-COAG VISIT (OUTPATIENT)
Dept: CARDIOLOGY CLINIC | Age: 80
End: 2025-01-07

## 2025-01-07 DIAGNOSIS — I48.0 PAROXYSMAL ATRIAL FIBRILLATION (HCC): Primary | Chronic | ICD-10-CM

## 2025-01-20 ENCOUNTER — ANTI-COAG VISIT (OUTPATIENT)
Dept: CARDIOLOGY CLINIC | Age: 80
End: 2025-01-20

## 2025-01-20 DIAGNOSIS — I48.0 PAROXYSMAL ATRIAL FIBRILLATION (HCC): Primary | Chronic | ICD-10-CM

## 2025-01-20 LAB — INR BLD: 2.2

## 2025-02-03 LAB — INR BLD: 2.6

## 2025-02-06 ENCOUNTER — ANTI-COAG VISIT (OUTPATIENT)
Dept: CARDIOLOGY CLINIC | Age: 80
End: 2025-02-06

## 2025-02-06 DIAGNOSIS — Z95.2 S/P AVR (AORTIC VALVE REPLACEMENT): Primary | Chronic | ICD-10-CM

## 2025-03-03 ENCOUNTER — ANTI-COAG VISIT (OUTPATIENT)
Dept: CARDIOLOGY CLINIC | Age: 80
End: 2025-03-03
Payer: MEDICARE

## 2025-03-03 DIAGNOSIS — I48.0 PAROXYSMAL ATRIAL FIBRILLATION (HCC): Primary | Chronic | ICD-10-CM

## 2025-03-03 LAB — INR BLD: 2

## 2025-03-03 PROCEDURE — 93793 ANTICOAG MGMT PT WARFARIN: CPT | Performed by: NURSE PRACTITIONER

## 2025-03-10 RX ORDER — FUROSEMIDE 40 MG/1
40 TABLET ORAL DAILY
Qty: 90 TABLET | Refills: 1 | Status: SHIPPED | OUTPATIENT
Start: 2025-03-10

## 2025-03-17 LAB — INR BLD: 2.6

## 2025-03-17 PROCEDURE — 93793 ANTICOAG MGMT PT WARFARIN: CPT | Performed by: NURSE PRACTITIONER

## 2025-03-18 ENCOUNTER — ANTI-COAG VISIT (OUTPATIENT)
Dept: CARDIOLOGY CLINIC | Age: 80
End: 2025-03-18
Payer: MEDICARE

## 2025-03-18 DIAGNOSIS — I48.0 PAROXYSMAL ATRIAL FIBRILLATION (HCC): Primary | Chronic | ICD-10-CM

## 2025-03-18 NOTE — PROGRESS NOTES
[facility]   Electrophysiology Outpatient Note              Date:  March 20, 2025  Patient name: Ede Zacarias  YOB: 1945    Primary Care physician: Justin Cuenca APRN - NP    HISTORY OF PRESENT ILLNESS: The patient is a 79 y.o.  male with a history of AF, AFL, AV block, status post aortic valve replacement, VSD closure, HTN, HLD and thyroid disease.    In 2006 patient underwent aortic valve replacement and closure of a VSD.  In 2012, he had a radiofrequency catheter ablation for atrial flutter.  In 2014, patient had AV block and had a pacemaker implanted.  In 2019, patient underwent PVI for atrial fibrillation.  In 11/2021, echo showed an EF of 55%.  In 5/2022, patient wore an event monitor that showed predominantly normal sinus rhythm with an average heart rate 78 and a 30% burden of mostly uniform PVCs.  In 7/2022, echo showed an EF of 55 to 60%. In 2024, he had a generator change.   Today he is being seen for AV block, atrial arrhythmias and PVC's. EKG shows AP VS with a HR of 75 and frequent PVC's. He is feeling well. Denies chest pain, palpitations, shortness of breath, and dizziness.  He remains unaware of PVCs.  He continues to exercise at the [facility] several days per week.  Tolerating his medication.      Device check today shows:   Brand: Abbott  Mode: DDDR  Normal function   48% AP  4.6%    Arrhythmias: AF 1.7%, PVC's 14%  Battery life < 3 months  RA impedance 460 ohms   RV impedance 440 ohms   RA threshold 1.0 V @ 0.40 ms  RV threshold 1.75 V @ 0.40 ms        Past Medical History:   has a past medical history of Arthritis, Atrial fibrillation (HCC), CAD (coronary artery disease), Cardiac pacemaker, Hyperlipidemia, Hypertension, Mitral valve disease, and Thyroid disease.    Past Surgical History:   has a past surgical history that includes Cardiac surgery (2009); cyst removal (1972); skin biopsy; Pacemaker insertion; Atrial ablation surgery (12-28-12); ventral hernia

## 2025-03-20 ENCOUNTER — OFFICE VISIT (OUTPATIENT)
Dept: CARDIOLOGY CLINIC | Age: 80
End: 2025-03-20

## 2025-03-20 ENCOUNTER — CLINICAL SUPPORT (OUTPATIENT)
Dept: CARDIOLOGY CLINIC | Age: 80
End: 2025-03-20

## 2025-03-20 VITALS
SYSTOLIC BLOOD PRESSURE: 110 MMHG | DIASTOLIC BLOOD PRESSURE: 62 MMHG | OXYGEN SATURATION: 97 % | HEIGHT: 70 IN | BODY MASS INDEX: 25.44 KG/M2 | HEART RATE: 75 BPM | WEIGHT: 177.69 LBS

## 2025-03-20 DIAGNOSIS — Z95.0 CARDIAC PACEMAKER IN SITU: Primary | Chronic | ICD-10-CM

## 2025-03-20 DIAGNOSIS — I48.0 PAROXYSMAL ATRIAL FIBRILLATION (HCC): ICD-10-CM

## 2025-03-20 DIAGNOSIS — I44.2 ATRIOVENTRICULAR BLOCK, COMPLETE (HCC): Chronic | ICD-10-CM

## 2025-03-20 DIAGNOSIS — I48.3 TYPICAL ATRIAL FLUTTER (HCC): Primary | ICD-10-CM

## 2025-03-20 DIAGNOSIS — I44.2 ATRIOVENTRICULAR BLOCK, COMPLETE (HCC): ICD-10-CM

## 2025-03-20 DIAGNOSIS — I49.5 SINUS NODE DYSFUNCTION (HCC): ICD-10-CM

## 2025-03-20 RX ORDER — METOPROLOL SUCCINATE 100 MG/1
100 TABLET, EXTENDED RELEASE ORAL NIGHTLY
Qty: 90 TABLET | Refills: 3 | Status: SHIPPED | OUTPATIENT
Start: 2025-03-20

## 2025-03-20 NOTE — PATIENT INSTRUCTIONS
No changes today     Continue current medications     Remote device transmissions every three months     Monitor BP at home and call if consistently out of goal ranges    Follow up in 6 months or sooner if needed

## 2025-03-31 LAB — INR BLD: 2.4

## 2025-03-31 PROCEDURE — 93793 ANTICOAG MGMT PT WARFARIN: CPT

## 2025-04-01 ENCOUNTER — ANTI-COAG VISIT (OUTPATIENT)
Dept: CARDIOLOGY CLINIC | Age: 80
End: 2025-04-01
Payer: MEDICARE

## 2025-04-01 DIAGNOSIS — Z95.2 S/P AVR (AORTIC VALVE REPLACEMENT): Primary | Chronic | ICD-10-CM

## 2025-04-14 ENCOUNTER — ANTI-COAG VISIT (OUTPATIENT)
Dept: CARDIOLOGY CLINIC | Age: 80
End: 2025-04-14
Payer: MEDICARE

## 2025-04-14 DIAGNOSIS — I48.0 PAROXYSMAL ATRIAL FIBRILLATION (HCC): Primary | Chronic | ICD-10-CM

## 2025-04-14 LAB — INR BLD: 2.2

## 2025-04-14 PROCEDURE — 93793 ANTICOAG MGMT PT WARFARIN: CPT | Performed by: NURSE PRACTITIONER

## 2025-04-28 ENCOUNTER — ANTI-COAG VISIT (OUTPATIENT)
Dept: CARDIOLOGY CLINIC | Age: 80
End: 2025-04-28
Payer: MEDICARE

## 2025-04-28 DIAGNOSIS — I48.0 PAROXYSMAL ATRIAL FIBRILLATION (HCC): Primary | Chronic | ICD-10-CM

## 2025-04-28 LAB — INR BLD: 2.5

## 2025-04-28 PROCEDURE — 93793 ANTICOAG MGMT PT WARFARIN: CPT | Performed by: NURSE PRACTITIONER

## 2025-05-12 ENCOUNTER — ANTI-COAG VISIT (OUTPATIENT)
Dept: CARDIOLOGY CLINIC | Age: 80
End: 2025-05-12
Payer: MEDICARE

## 2025-05-12 DIAGNOSIS — Z95.2 S/P AVR (AORTIC VALVE REPLACEMENT): Primary | Chronic | ICD-10-CM

## 2025-05-12 LAB — INR BLD: 2.5

## 2025-05-12 PROCEDURE — 93793 ANTICOAG MGMT PT WARFARIN: CPT | Performed by: NURSE PRACTITIONER

## 2025-05-26 LAB — INR BLD: 2

## 2025-05-26 PROCEDURE — 93793 ANTICOAG MGMT PT WARFARIN: CPT | Performed by: NURSE PRACTITIONER

## 2025-05-27 ENCOUNTER — ANTI-COAG VISIT (OUTPATIENT)
Dept: CARDIOLOGY CLINIC | Age: 80
End: 2025-05-27
Payer: MEDICARE

## 2025-05-27 DIAGNOSIS — Z95.2 S/P AVR (AORTIC VALVE REPLACEMENT): Primary | Chronic | ICD-10-CM

## 2025-06-09 LAB — INR BLD: 2.7

## 2025-06-10 ENCOUNTER — ANTI-COAG VISIT (OUTPATIENT)
Dept: CARDIOLOGY CLINIC | Age: 80
End: 2025-06-10

## 2025-06-10 DIAGNOSIS — I48.0 PAROXYSMAL ATRIAL FIBRILLATION (HCC): Primary | Chronic | ICD-10-CM

## 2025-06-10 DIAGNOSIS — Z95.2 S/P AVR (AORTIC VALVE REPLACEMENT): Chronic | ICD-10-CM

## 2025-06-13 NOTE — PROGRESS NOTES
[facility]   Cardiac Follow Up    Referring Provider:  Justin Cuenca APRN - NP     Chief Complaint   Patient presents with    1 Year Follow Up    Hypertension    Hyperlipidemia        Subjective:  Patient being seen today for routine cardiology follow up of HTN, HLD, PAF, PPM; no complaints today    Past Medical History:   Mr Zacarias 77yo male who has PMH porcine AVR and VSD closure in 12/11/06 complicated by post-op AV block s/p  (s/p , frequent PVC's (Dr. Maldonado), PAF/flutter s/p RFA, HLD (on zetia--refuses statins, PCSK9i), NOCAD, and s/p TURP per Dr. Rosen 2023. He had RFA for aflutter on 12/28/12 and again 2/13/19 by Dr. Lin (on coumadin regulated by [place] coumadin [facility]-has home machine--DOAC too expensive). Prior took amiodarone but now d/c'd and hx chronically elevated LFT's. Renea nuc 1/9/19 abnormal. LHC 1/11/19 LM <10% LAD prox, mid-distal <10%, Lcx prox-mid 60-70%, RCA prox-mid 20-30% mid distal 50-60%. Moderate LCx/RCA disease. Medical mgt without PCI recommended. He refuses statins (rash with crestor) or PCSK9 inhibitors.    24 hour 5/13/22 noted SR with occasional; brief PAT; very frequent mostly uniform PVC's (30% burden). Limited Echo 7/5/22 EF=55-60% (same 11/21, 1/19); bio. AVR well seated with a peak velocity of 2.59 msec; MPG 15 mmHg; Mild bettie-valvular AI; Mild MR/MT/TR.S/P replacement with dual chamber [employer]. Geoff  on 5/31/24. EKG 5/31/24 Electronic atrial pacemaker 55bpm; RBBB; inferior infarct. EKG 3/20/25 NSR 75bpm; 1st degree A-V block -Frequent pvcs -ventricular bigeminy, RBBB, Device check 3/31/25 A PACING 59% RV PACING 7.4% AT/AF BURDEN 2%, PARVIN 8.17yrs.   Today, he is doing well from a cardiac standpoint and has no difficulties doing his daily activities. Patient denies current edema, chest pain, shortness of breath, palpitations, dizziness or syncope. Patient is taking all cardiac medications as prescribed and tolerates them well. Went to [facility] in

## 2025-06-23 ENCOUNTER — ANTI-COAG VISIT (OUTPATIENT)
Dept: CARDIOLOGY CLINIC | Age: 80
End: 2025-06-23
Payer: MEDICARE

## 2025-06-23 DIAGNOSIS — I48.0 PAROXYSMAL ATRIAL FIBRILLATION (HCC): Primary | Chronic | ICD-10-CM

## 2025-06-23 LAB — INR BLD: 2.4

## 2025-06-23 PROCEDURE — 93793 ANTICOAG MGMT PT WARFARIN: CPT | Performed by: NURSE PRACTITIONER

## 2025-06-26 ENCOUNTER — OFFICE VISIT (OUTPATIENT)
Dept: CARDIOLOGY CLINIC | Age: 80
End: 2025-06-26
Payer: MEDICARE

## 2025-06-26 VITALS
HEIGHT: 70 IN | WEIGHT: 177 LBS | BODY MASS INDEX: 25.34 KG/M2 | SYSTOLIC BLOOD PRESSURE: 110 MMHG | HEART RATE: 76 BPM | OXYGEN SATURATION: 95 % | DIASTOLIC BLOOD PRESSURE: 62 MMHG

## 2025-06-26 DIAGNOSIS — E78.2 MIXED HYPERLIPIDEMIA: ICD-10-CM

## 2025-06-26 DIAGNOSIS — Z95.0 CARDIAC PACEMAKER IN SITU: ICD-10-CM

## 2025-06-26 DIAGNOSIS — I10 ESSENTIAL HYPERTENSION: ICD-10-CM

## 2025-06-26 DIAGNOSIS — I48.0 PAF (PAROXYSMAL ATRIAL FIBRILLATION) (HCC): ICD-10-CM

## 2025-06-26 DIAGNOSIS — Z95.2 S/P AVR (AORTIC VALVE REPLACEMENT): Primary | ICD-10-CM

## 2025-06-26 DIAGNOSIS — I25.10 CORONARY ARTERY DISEASE INVOLVING NATIVE CORONARY ARTERY OF NATIVE HEART WITHOUT ANGINA PECTORIS: ICD-10-CM

## 2025-06-26 PROCEDURE — 99214 OFFICE O/P EST MOD 30 MIN: CPT | Performed by: INTERNAL MEDICINE

## 2025-06-26 PROCEDURE — 3074F SYST BP LT 130 MM HG: CPT | Performed by: INTERNAL MEDICINE

## 2025-06-26 PROCEDURE — 1159F MED LIST DOCD IN RCRD: CPT | Performed by: INTERNAL MEDICINE

## 2025-06-26 PROCEDURE — 1123F ACP DISCUSS/DSCN MKR DOCD: CPT | Performed by: INTERNAL MEDICINE

## 2025-06-26 PROCEDURE — G8427 DOCREV CUR MEDS BY ELIG CLIN: HCPCS | Performed by: INTERNAL MEDICINE

## 2025-06-26 PROCEDURE — 3078F DIAST BP <80 MM HG: CPT | Performed by: INTERNAL MEDICINE

## 2025-06-26 PROCEDURE — G8417 CALC BMI ABV UP PARAM F/U: HCPCS | Performed by: INTERNAL MEDICINE

## 2025-06-26 PROCEDURE — 1036F TOBACCO NON-USER: CPT | Performed by: INTERNAL MEDICINE

## 2025-06-26 RX ORDER — METOPROLOL SUCCINATE 100 MG/1
100 TABLET, EXTENDED RELEASE ORAL NIGHTLY
Qty: 90 TABLET | Refills: 3 | Status: SHIPPED | OUTPATIENT
Start: 2025-06-26

## 2025-06-26 RX ORDER — DILTIAZEM HYDROCHLORIDE 180 MG/1
CAPSULE, COATED, EXTENDED RELEASE ORAL
Qty: 90 CAPSULE | Refills: 3 | Status: SHIPPED | OUTPATIENT
Start: 2025-06-26

## 2025-06-26 RX ORDER — EZETIMIBE 10 MG/1
10 TABLET ORAL DAILY
Qty: 90 TABLET | Refills: 3 | Status: SHIPPED | OUTPATIENT
Start: 2025-06-26

## 2025-06-26 RX ORDER — FUROSEMIDE 40 MG/1
40 TABLET ORAL DAILY
Qty: 90 TABLET | Refills: 3 | Status: SHIPPED | OUTPATIENT
Start: 2025-06-26

## 2025-06-26 NOTE — PATIENT INSTRUCTIONS
Labs reviewed in epic and discussed with patient.  Medications reviewed in office. Medications refilled as warranted  No cardiac testing at this time.

## 2025-07-07 ENCOUNTER — ANTI-COAG VISIT (OUTPATIENT)
Dept: CARDIOLOGY CLINIC | Age: 80
End: 2025-07-07
Payer: MEDICARE

## 2025-07-07 DIAGNOSIS — Z95.2 S/P AVR (AORTIC VALVE REPLACEMENT): Primary | Chronic | ICD-10-CM

## 2025-07-07 LAB — INR BLD: 2

## 2025-07-07 PROCEDURE — 93793 ANTICOAG MGMT PT WARFARIN: CPT | Performed by: NURSE PRACTITIONER

## 2025-07-21 ENCOUNTER — ANTI-COAG VISIT (OUTPATIENT)
Dept: CARDIOLOGY CLINIC | Age: 80
End: 2025-07-21
Payer: MEDICARE

## 2025-07-21 DIAGNOSIS — I48.3 TYPICAL ATRIAL FLUTTER (HCC): Primary | ICD-10-CM

## 2025-07-21 LAB — INR BLD: 2.4

## 2025-07-21 PROCEDURE — 93793 ANTICOAG MGMT PT WARFARIN: CPT | Performed by: NURSE PRACTITIONER

## 2025-08-04 LAB — INR BLD: 2.7

## 2025-08-05 ENCOUNTER — ANTI-COAG VISIT (OUTPATIENT)
Dept: CARDIOLOGY CLINIC | Age: 80
End: 2025-08-05
Payer: MEDICARE

## 2025-08-05 DIAGNOSIS — I48.0 PAROXYSMAL ATRIAL FIBRILLATION (HCC): Primary | Chronic | ICD-10-CM

## 2025-08-05 PROCEDURE — 93793 ANTICOAG MGMT PT WARFARIN: CPT | Performed by: NURSE PRACTITIONER

## 2025-08-18 ENCOUNTER — ANTI-COAG VISIT (OUTPATIENT)
Dept: CARDIOLOGY CLINIC | Age: 80
End: 2025-08-18
Payer: MEDICARE

## 2025-08-18 DIAGNOSIS — I48.0 PAROXYSMAL ATRIAL FIBRILLATION (HCC): Primary | Chronic | ICD-10-CM

## 2025-08-18 LAB — INR BLD: 2.7

## 2025-08-18 PROCEDURE — 93793 ANTICOAG MGMT PT WARFARIN: CPT

## 2025-08-29 ENCOUNTER — TELEPHONE (OUTPATIENT)
Dept: CARDIOLOGY CLINIC | Age: 80
End: 2025-08-29

## 2025-09-02 ENCOUNTER — OFFICE VISIT (OUTPATIENT)
Dept: CARDIOLOGY CLINIC | Age: 80
End: 2025-09-02
Payer: MEDICARE

## 2025-09-02 ENCOUNTER — CLINICAL SUPPORT (OUTPATIENT)
Dept: CARDIOLOGY CLINIC | Age: 80
End: 2025-09-02

## 2025-09-02 VITALS
OXYGEN SATURATION: 93 % | HEIGHT: 70 IN | BODY MASS INDEX: 25.79 KG/M2 | SYSTOLIC BLOOD PRESSURE: 128 MMHG | HEART RATE: 69 BPM | WEIGHT: 180.12 LBS | DIASTOLIC BLOOD PRESSURE: 62 MMHG

## 2025-09-02 DIAGNOSIS — I49.5 SINUS NODE DYSFUNCTION (HCC): ICD-10-CM

## 2025-09-02 DIAGNOSIS — I10 ESSENTIAL HYPERTENSION: ICD-10-CM

## 2025-09-02 DIAGNOSIS — I48.3 TYPICAL ATRIAL FLUTTER (HCC): ICD-10-CM

## 2025-09-02 DIAGNOSIS — Z95.0 CARDIAC PACEMAKER IN SITU: Primary | Chronic | ICD-10-CM

## 2025-09-02 DIAGNOSIS — I48.0 PAROXYSMAL ATRIAL FIBRILLATION (HCC): Primary | Chronic | ICD-10-CM

## 2025-09-02 DIAGNOSIS — I44.2 ATRIOVENTRICULAR BLOCK, COMPLETE (HCC): Chronic | ICD-10-CM

## 2025-09-02 PROCEDURE — 3074F SYST BP LT 130 MM HG: CPT | Performed by: NURSE PRACTITIONER

## 2025-09-02 PROCEDURE — 3078F DIAST BP <80 MM HG: CPT | Performed by: NURSE PRACTITIONER

## 2025-09-02 PROCEDURE — 1159F MED LIST DOCD IN RCRD: CPT | Performed by: NURSE PRACTITIONER

## 2025-09-02 PROCEDURE — 1036F TOBACCO NON-USER: CPT | Performed by: NURSE PRACTITIONER

## 2025-09-02 PROCEDURE — 93000 ELECTROCARDIOGRAM COMPLETE: CPT | Performed by: NURSE PRACTITIONER

## 2025-09-02 PROCEDURE — 99214 OFFICE O/P EST MOD 30 MIN: CPT | Performed by: NURSE PRACTITIONER

## 2025-09-02 PROCEDURE — 1160F RVW MEDS BY RX/DR IN RCRD: CPT | Performed by: NURSE PRACTITIONER

## 2025-09-02 PROCEDURE — G8427 DOCREV CUR MEDS BY ELIG CLIN: HCPCS | Performed by: NURSE PRACTITIONER

## 2025-09-02 PROCEDURE — G8417 CALC BMI ABV UP PARAM F/U: HCPCS | Performed by: NURSE PRACTITIONER

## 2025-09-02 PROCEDURE — G2211 COMPLEX E/M VISIT ADD ON: HCPCS | Performed by: NURSE PRACTITIONER

## 2025-09-02 PROCEDURE — 1123F ACP DISCUSS/DSCN MKR DOCD: CPT | Performed by: NURSE PRACTITIONER

## 2025-09-05 ENCOUNTER — HOSPITAL ENCOUNTER (EMERGENCY)
Age: 80
Discharge: HOME | End: 2025-09-05
Attending: EMERGENCY MEDICINE
Payer: MEDICARE

## 2025-09-05 ENCOUNTER — APPOINTMENT (OUTPATIENT)
Dept: CT IMAGING | Age: 80
End: 2025-09-05
Payer: MEDICARE

## 2025-09-05 VITALS
RESPIRATION RATE: 16 BRPM | SYSTOLIC BLOOD PRESSURE: 146 MMHG | TEMPERATURE: 98.5 F | DIASTOLIC BLOOD PRESSURE: 78 MMHG | HEART RATE: 96 BPM | OXYGEN SATURATION: 92 %

## 2025-09-05 DIAGNOSIS — N39.0 ACUTE URINARY TRACT INFECTION: ICD-10-CM

## 2025-09-05 DIAGNOSIS — R10.31 ABDOMINAL PAIN, RIGHT LOWER QUADRANT: Primary | ICD-10-CM

## 2025-09-05 DIAGNOSIS — N28.9 RENAL LESION: ICD-10-CM

## 2025-09-05 LAB
ALBUMIN SERPL-MCNC: 4.5 G/DL (ref 3.4–5)
ALBUMIN/GLOB SERPL: 1.3 {RATIO} (ref 1.1–2.2)
ALP SERPL-CCNC: 80 U/L (ref 40–129)
ALT SERPL-CCNC: 18 U/L (ref 10–40)
ANION GAP SERPL CALCULATED.3IONS-SCNC: 13 MMOL/L (ref 3–16)
AST SERPL-CCNC: 27 U/L (ref 15–37)
BACTERIA URNS QL MICRO: ABNORMAL /HPF
BASOPHILS # BLD: 0 K/UL (ref 0–0.2)
BASOPHILS NFR BLD: 0.5 %
BILIRUB SERPL-MCNC: 1.6 MG/DL (ref 0–1)
BILIRUB UR QL STRIP.AUTO: NEGATIVE
BUN SERPL-MCNC: 21 MG/DL (ref 7–20)
CALCIUM SERPL-MCNC: 8.7 MG/DL (ref 8.3–10.6)
CHLORIDE SERPL-SCNC: 100 MMOL/L (ref 99–110)
CLARITY UR: CLEAR
CO2 SERPL-SCNC: 25 MMOL/L (ref 21–32)
COLOR UR: YELLOW
CREAT SERPL-MCNC: 1 MG/DL (ref 0.8–1.3)
DEPRECATED RDW RBC AUTO: 14.8 % (ref 12.4–15.4)
EOSINOPHIL # BLD: 0 K/UL (ref 0–0.6)
EOSINOPHIL NFR BLD: 0.1 %
EPI CELLS #/AREA URNS HPF: ABNORMAL /HPF (ref 0–5)
GFR SERPLBLD CREATININE-BSD FMLA CKD-EPI: 76 ML/MIN/{1.73_M2}
GLUCOSE SERPL-MCNC: 131 MG/DL (ref 70–99)
GLUCOSE UR STRIP.AUTO-MCNC: NEGATIVE MG/DL
HCT VFR BLD AUTO: 39.9 % (ref 40.5–52.5)
HGB BLD-MCNC: 13.8 G/DL (ref 13.5–17.5)
HGB UR QL STRIP.AUTO: ABNORMAL
INR PPP: 2.11 (ref 0.86–1.14)
KETONES UR STRIP.AUTO-MCNC: NEGATIVE MG/DL
LEUKOCYTE ESTERASE UR QL STRIP.AUTO: NEGATIVE
LYMPHOCYTES # BLD: 0.5 K/UL (ref 1–5.1)
LYMPHOCYTES NFR BLD: 6.1 %
MCH RBC QN AUTO: 32.6 PG (ref 26–34)
MCHC RBC AUTO-ENTMCNC: 34.6 G/DL (ref 31–36)
MCV RBC AUTO: 94.3 FL (ref 80–100)
MONOCYTES # BLD: 0.8 K/UL (ref 0–1.3)
MONOCYTES NFR BLD: 9.1 %
MUCOUS THREADS #/AREA URNS LPF: ABNORMAL /LPF
NEUTROPHILS # BLD: 7.3 K/UL (ref 1.7–7.7)
NEUTROPHILS NFR BLD: 84.2 %
NITRITE UR QL STRIP.AUTO: POSITIVE
PH UR STRIP.AUTO: 6 [PH] (ref 5–8)
PLATELET # BLD AUTO: 155 K/UL (ref 135–450)
PMV BLD AUTO: 7.9 FL (ref 5–10.5)
POTASSIUM SERPL-SCNC: 3.9 MMOL/L (ref 3.5–5.1)
PROT SERPL-MCNC: 7.9 G/DL (ref 6.4–8.2)
PROT UR STRIP.AUTO-MCNC: 30 MG/DL
PROTHROMBIN TIME: 23.5 SEC (ref 12.1–14.9)
RBC # BLD AUTO: 4.23 M/UL (ref 4.2–5.9)
RBC #/AREA URNS HPF: ABNORMAL /HPF (ref 0–4)
SODIUM SERPL-SCNC: 138 MMOL/L (ref 136–145)
SP GR UR STRIP.AUTO: 1.02 (ref 1–1.03)
UA COMPLETE W REFLEX CULTURE PNL UR: ABNORMAL
UA DIPSTICK W REFLEX MICRO PNL UR: YES
URN SPEC COLLECT METH UR: ABNORMAL
UROBILINOGEN UR STRIP-ACNC: 0.2 E.U./DL
WBC # BLD AUTO: 8.6 K/UL (ref 4–11)
WBC #/AREA URNS HPF: ABNORMAL /HPF (ref 0–5)

## 2025-09-05 PROCEDURE — 85025 COMPLETE CBC W/AUTO DIFF WBC: CPT

## 2025-09-05 PROCEDURE — 81001 URINALYSIS AUTO W/SCOPE: CPT

## 2025-09-05 PROCEDURE — 85610 PROTHROMBIN TIME: CPT

## 2025-09-05 PROCEDURE — 74177 CT ABD & PELVIS W/CONTRAST: CPT

## 2025-09-05 PROCEDURE — 80053 COMPREHEN METABOLIC PANEL: CPT

## 2025-09-05 PROCEDURE — 6360000004 HC RX CONTRAST MEDICATION: Performed by: EMERGENCY MEDICINE

## 2025-09-05 RX ORDER — CEPHALEXIN 500 MG/1
500 CAPSULE ORAL 2 TIMES DAILY
Qty: 10 CAPSULE | Refills: 0 | Status: SHIPPED | OUTPATIENT
Start: 2025-09-05 | End: 2025-09-10

## 2025-09-05 RX ORDER — IOPAMIDOL 755 MG/ML
75 INJECTION, SOLUTION INTRAVASCULAR
Status: COMPLETED | OUTPATIENT
Start: 2025-09-05 | End: 2025-09-05

## 2025-09-05 RX ADMIN — IOPAMIDOL 75 ML: 755 INJECTION, SOLUTION INTRAVENOUS at 08:43

## 2025-09-05 ASSESSMENT — PAIN SCALES - GENERAL: PAINLEVEL_OUTOF10: 8

## 2025-09-05 ASSESSMENT — LIFESTYLE VARIABLES
HOW MANY STANDARD DRINKS CONTAINING ALCOHOL DO YOU HAVE ON A TYPICAL DAY: PATIENT DOES NOT DRINK
HOW OFTEN DO YOU HAVE A DRINK CONTAINING ALCOHOL: NEVER

## 2025-09-05 ASSESSMENT — PAIN - FUNCTIONAL ASSESSMENT: PAIN_FUNCTIONAL_ASSESSMENT: 0-10

## (undated) DEVICE — SOLUTION IV IRRIG WATER 1000ML POUR BRL 2F7114

## (undated) DEVICE — PREP SOL PVP IODINE 4%  4 OZ/BTL

## (undated) DEVICE — SYRINGE 60ML IRRIG PISTON TOMEY

## (undated) DEVICE — Y-TYPE TUR/BLADDER IRRIGATION SET, REGULATING CLAMP

## (undated) DEVICE — BASIC SINGLE BASIN 1-LF: Brand: MEDLINE INDUSTRIES, INC.

## (undated) DEVICE — BASIC CYSTO I-LF: Brand: MEDLINE INDUSTRIES, INC.

## (undated) DEVICE — BAG DRAINAGE 2000ML UROLOGY ANTI REFLUX CHAMBER SAMPLE PORT

## (undated) DEVICE — GLOVE ORANGE PI 7   MSG9070

## (undated) DEVICE — PACEMAKER PACK: Brand: MEDLINE INDUSTRIES, INC.

## (undated) DEVICE — BIVAP ELECTRODE BIPO 22FR 12/30°  FOR RESECTOSCOPES, TELESCOPE Ø 4 MM, FOR SHEATHS, CONTINUOUS IRRIGATION, 22/24 FR, ELECTRODE SHAPE: TORUS, FORK COLOR BLUE, STEM COLOR BLUE, PACK=3 PCS, FOR SHARK AND S-LINE RESECTOSCOPES, STERILE, FOR SINGLE USE: Brand: SHARK/S-LINE

## (undated) DEVICE — BAG URIN STRL FOR URO CTCH SYS

## (undated) DEVICE — 35 ML SYRINGE REGULAR TIP: Brand: MONOJECT

## (undated) DEVICE — BOWL MED L 32OZ PLAS W/ MOLD GRAD EZ OPN PEEL PCH

## (undated) DEVICE — TUBING, SUCTION, 3/16" X 10', STRAIGHT: Brand: MEDLINE

## (undated) DEVICE — ELECTRODE LOOP 24FR R ANG MPLR CUT

## (undated) DEVICE — CABLE ENDOSCP L10FT ACT DISP

## (undated) DEVICE — COVER BOOT THK2MIL UNIV POLYETH IMPERMEABLE FLD RESIST DISP

## (undated) DEVICE — SOLUTION IRRIG 3000ML 1.5% GL USP UROMATIC CONT

## (undated) DEVICE — ELECTRODE PT RET AD L9FT HI MOIST COND ADH HYDRGEL CORDED

## (undated) DEVICE — CATHETER URETH 22FR BLLN 30CC 3 W F SPEC INF CTRL BARDX

## (undated) DEVICE — GOWN SIRUS NONREIN XL W/TWL: Brand: MEDLINE INDUSTRIES, INC.

## (undated) DEVICE — CIRCUIT ANES L72IN 3L BACT AND VIR FLTR EL CONN SGL LIMB